# Patient Record
Sex: MALE | Race: WHITE | NOT HISPANIC OR LATINO | Employment: OTHER | URBAN - METROPOLITAN AREA
[De-identification: names, ages, dates, MRNs, and addresses within clinical notes are randomized per-mention and may not be internally consistent; named-entity substitution may affect disease eponyms.]

---

## 2017-01-19 ENCOUNTER — HOSPITAL ENCOUNTER (OUTPATIENT)
Dept: LAB | Facility: HOSPITAL | Age: 77
Discharge: HOME | End: 2017-01-19
Attending: FAMILY MEDICINE | Admitting: FAMILY MEDICINE

## 2017-01-19 LAB
INR PPP: 2.4 (ref 2–3)
PROTHROMBIN TIME: ABNORMAL SEC (ref 19.4–28.5)

## 2017-08-02 ENCOUNTER — HOSPITAL ENCOUNTER (OUTPATIENT)
Dept: CARDIOLOGY | Facility: HOSPITAL | Age: 77
Discharge: HOME | End: 2017-08-02
Attending: INTERNAL MEDICINE | Admitting: INTERNAL MEDICINE

## 2017-08-07 ENCOUNTER — HOSPITAL ENCOUNTER (OUTPATIENT)
Dept: CT IMAGING | Facility: HOSPITAL | Age: 77
Discharge: HOME | End: 2017-08-07
Attending: INTERNAL MEDICINE | Admitting: INTERNAL MEDICINE

## 2017-08-07 LAB — CREAT BLDA-MCNC: 1 MG/DL (ref 0.6–1.3)

## 2017-09-07 ENCOUNTER — OFFICE VISIT (OUTPATIENT)
Dept: CARDIAC SURGERY | Facility: CLINIC | Age: 77
End: 2017-09-07

## 2017-09-07 VITALS
OXYGEN SATURATION: 95 % | DIASTOLIC BLOOD PRESSURE: 77 MMHG | BODY MASS INDEX: 23.68 KG/M2 | SYSTOLIC BLOOD PRESSURE: 125 MMHG | TEMPERATURE: 97.4 F | HEART RATE: 82 BPM | RESPIRATION RATE: 20 BRPM | HEIGHT: 70 IN | WEIGHT: 165.4 LBS

## 2017-09-07 DIAGNOSIS — J44.9 COPD MIXED TYPE (HCC): ICD-10-CM

## 2017-09-07 DIAGNOSIS — I36.1 NON-RHEUMATIC TRICUSPID VALVE INSUFFICIENCY: ICD-10-CM

## 2017-09-07 DIAGNOSIS — I71.40 ABDOMINAL AORTIC ANEURYSM (AAA) WITHOUT RUPTURE (HCC): ICD-10-CM

## 2017-09-07 DIAGNOSIS — I48.0 PAF (PAROXYSMAL ATRIAL FIBRILLATION) (HCC): ICD-10-CM

## 2017-09-07 DIAGNOSIS — R91.1 INCIDENTAL LUNG NODULE, > 3MM AND < 8MM: ICD-10-CM

## 2017-09-07 DIAGNOSIS — I71.21 ASCENDING AORTIC ANEURYSM (HCC): Primary | ICD-10-CM

## 2017-09-07 PROCEDURE — 99204 OFFICE O/P NEW MOD 45 MIN: CPT | Performed by: THORACIC SURGERY (CARDIOTHORACIC VASCULAR SURGERY)

## 2017-09-07 RX ORDER — POTASSIUM CHLORIDE 1500 MG/1
TABLET, EXTENDED RELEASE ORAL
Status: ON HOLD | COMMUNITY
Start: 2017-06-26 | End: 2019-06-14 | Stop reason: SDUPTHER

## 2017-09-07 RX ORDER — BUPROPION HYDROCHLORIDE 150 MG/1
TABLET ORAL
Status: ON HOLD | COMMUNITY
Start: 2017-06-14 | End: 2019-06-14 | Stop reason: SDUPTHER

## 2017-09-07 RX ORDER — ALPRAZOLAM 0.25 MG/1
TABLET ORAL
Status: ON HOLD | COMMUNITY
Start: 2017-06-26 | End: 2019-06-14 | Stop reason: SDUPTHER

## 2017-09-07 RX ORDER — ROPINIROLE 0.5 MG/1
TABLET, FILM COATED ORAL
COMMUNITY
Start: 2017-06-26 | End: 2017-09-07 | Stop reason: DRUGHIGH

## 2017-09-10 PROBLEM — I36.1 NON-RHEUMATIC TRICUSPID VALVE INSUFFICIENCY: Status: ACTIVE | Noted: 2017-09-10

## 2017-09-10 PROBLEM — J44.9 COPD MIXED TYPE (HCC): Status: ACTIVE | Noted: 2017-09-10

## 2017-09-10 PROBLEM — R91.1 INCIDENTAL LUNG NODULE, > 3MM AND < 8MM: Status: ACTIVE | Noted: 2017-09-10

## 2017-09-10 PROBLEM — I71.40 ABDOMINAL AORTIC ANEURYSM (AAA) WITHOUT RUPTURE (HCC): Status: ACTIVE | Noted: 2017-09-10

## 2017-09-10 PROBLEM — I48.0 PAF (PAROXYSMAL ATRIAL FIBRILLATION) (HCC): Status: ACTIVE | Noted: 2017-09-10

## 2017-09-10 PROBLEM — I71.21 ASCENDING AORTIC ANEURYSM (HCC): Status: ACTIVE | Noted: 2017-09-10

## 2017-09-10 NOTE — PROGRESS NOTES
9/7/17    Reason for consultation:   Evaluation of thoracic aortic aneurysm    Chief Complaint   Same    History of Present Illness:       Dear Dr. Frost and Colleagues,  It was nice to see Giles Margot in consultation at your request. He is a 76 y.o. male with a history of COPD, PAF who has been followed for dyspnea and low level CHF. He had a recent chest CT that showed a 4.8 cm (my measurement) ascending aortic aneurysm without dissection or ulceration. Also an old lung defect, possible PE was seen. He denies chest or upper back pain, syncope, TIA, LE edema or orthopnea. An echocardiogram showed an EF of 45 %, dilated RA/LA, mild TR and dilated RV. He had a chest CT in 2013 (3.8 cm), 2015 (4.2 cm) and now 4.7 cm. He has no family history of aneurysms or dissections.    Patient Active Problem List   Diagnosis   • Ascending aortic aneurysm   • Incidental lung nodule, > 3mm and < 8mm   • PAF (paroxysmal atrial fibrillation)   • COPD mixed type   • Abdominal aortic aneurysm (AAA) without rupture   • Non-rheumatic tricuspid valve insufficiency       Past Medical History:   Diagnosis Date   • Aortic aneurysm    • Atrial fibrillation    • CHF (congestive heart failure)    • COPD (chronic obstructive pulmonary disease)    • Orthostatic hypertension        Past Surgical History:   Procedure Laterality Date   • TONSILLECTOMY         Allergies   Allergen Reactions   • Gabapentin          Current Outpatient Prescriptions:   •  albuterol (PROVENTIL) (2.5 MG/3ML) 0.083% nebulizer solution, Take 2.5 mg by nebulization Every 4 (Four) Hours As Needed for Wheezing., Disp: , Rfl:   •  ALPRAZolam (XANAX) 0.25 MG tablet, , Disp: , Rfl:   •  beclomethasone (QVAR) 40 MCG/ACT inhaler, Inhale 2 puffs 2 (Two) Times a Day., Disp: , Rfl:   •  buPROPion XL (WELLBUTRIN XL) 150 MG 24 hr tablet, , Disp: , Rfl:   •  digoxin (LANOXIN) 125 MCG tablet, Take 125 mcg by mouth Daily., Disp: , Rfl:   •  diltiaZEM (CARDIZEM) 30 MG tablet, Take 30 mg  by mouth 4 (Four) Times a Day., Disp: , Rfl:   •  furosemide (LASIX) 40 MG tablet, Take 40 mg by mouth 2 (Two) Times a Day., Disp: , Rfl:   •  ipratropium-albuterol (COMBIVENT RESPIMAT)  MCG/ACT inhaler, Inhale 1 puff 4 (Four) Times a Day As Needed for Wheezing., Disp: , Rfl:   •  KLOR-CON 20 MEQ CR tablet, , Disp: , Rfl:   •  melatonin 1 MG tablet, Take 2 mg by mouth., Disp: , Rfl:   •  metFORMIN (FORTAMET) 500 MG (OSM) 24 hr tablet, Take 500 mg by mouth Daily With Breakfast., Disp: , Rfl:   •  rOPINIRole (REQUIP) 1 MG tablet, Take 1 mg by mouth Every Night. Take 1 hour before bedtime., Disp: , Rfl:   •  warfarin (COUMADIN) 3 MG tablet, Take 3 mg by mouth Daily., Disp: , Rfl:     Social History     Social History   • Marital status:      Spouse name: N/A   • Number of children: N/A   • Years of education: N/A     Occupational History   • Not on file.     Social History Main Topics   • Smoking status: Never Smoker   • Smokeless tobacco: Not on file   • Alcohol use No   • Drug use: No   • Sexual activity: Not on file     Other Topics Concern   • Not on file     Social History Narrative       FMH:  Negative per HPI    Review of Systems   Constitutional: Positive for fatigue.   Respiratory: Positive for shortness of breath. Negative for chest tightness and wheezing.    Cardiovascular: Negative for chest pain and leg swelling.   Genitourinary: Negative for dysuria and flank pain.   Neurological: Positive for syncope. Negative for dizziness.   All other systems reviewed and are negative.       Physical Exam:    Vital Signs:  Weight: 165 lb 6.4 oz (75 kg)   Body mass index is 23.73 kg/(m^2).  Temp: 97.4 °F (36.3 °C)   Heart Rate: 82   BP: 125/77     Physical Exam   Constitutional: He is oriented to person, place, and time. He appears well-developed and well-nourished.   HENT:   Head: Normocephalic and atraumatic.   Nose: Nose normal.   Mouth/Throat: Oropharynx is clear and moist.   Eyes: Conjunctivae are  normal. Pupils are equal, round, and reactive to light.   Neck: Normal range of motion. Neck supple. No JVD present. No thyromegaly present.   Cardiovascular: Normal rate, regular rhythm, normal heart sounds and intact distal pulses.  PMI is not displaced.  Exam reveals no gallop and no friction rub.    No murmur heard.  Pulses:       Radial pulses are 2+ on the right side, and 2+ on the left side.        Dorsalis pedis pulses are 2+ on the left side.        Posterior tibial pulses are 2+ on the right side   Pulmonary/Chest: Effort normal and breath sounds normal. He has no rales.   Abdominal: Soft. Bowel sounds are normal. He exhibits no distension and no mass. There is no tenderness.   Musculoskeletal: Normal range of motion. He exhibits no tenderness or deformity.   Lymphadenopathy:     He has no cervical adenopathy.   Neurological: He is alert and oriented to person, place, and time. He has normal reflexes.   Skin: Skin is warm and dry. No rash noted. No erythema.   Psychiatric: He has a normal mood and affect. His behavior is normal.   No neck or groin adenomegalies    Relevant studies (other than HPI)        Assessment:     Problem List Items Addressed This Visit        Cardiovascular and Mediastinum    Ascending aortic aneurysm - Primary    PAF (paroxysmal atrial fibrillation)    Non-rheumatic tricuspid valve insufficiency       Respiratory    Incidental lung nodule, > 3mm and < 8mm    COPD mixed type       Other    Abdominal aortic aneurysm (AAA) without rupture        Asymptomatic ascending aortic aneurysm, with slow but documented growth.  PAF  CHF class II  Syncope    Recommendation/Plan:     I recommend a chest CT and office visit in 1 year. I advised patient to visit ER if chest or upper back poain develops. He verbalized agreement.      Thank you for allowing me to participate in his care.    Regards,    Julián Mina M.D.

## 2018-09-19 ENCOUNTER — HOSPITAL ENCOUNTER (OUTPATIENT)
Dept: CT IMAGING | Facility: HOSPITAL | Age: 78
Discharge: HOME | End: 2018-09-19
Attending: THORACIC SURGERY (CARDIOTHORACIC VASCULAR SURGERY) | Admitting: THORACIC SURGERY (CARDIOTHORACIC VASCULAR SURGERY)

## 2018-10-11 ENCOUNTER — OFFICE VISIT (OUTPATIENT)
Dept: CARDIAC SURGERY | Facility: CLINIC | Age: 78
End: 2018-10-11

## 2018-10-11 VITALS
OXYGEN SATURATION: 93 % | HEIGHT: 70 IN | SYSTOLIC BLOOD PRESSURE: 118 MMHG | DIASTOLIC BLOOD PRESSURE: 74 MMHG | HEART RATE: 104 BPM | TEMPERATURE: 97.7 F | WEIGHT: 165 LBS | BODY MASS INDEX: 23.62 KG/M2

## 2018-10-11 DIAGNOSIS — I71.21 ASCENDING AORTIC ANEURYSM (HCC): Primary | ICD-10-CM

## 2018-10-11 DIAGNOSIS — I36.1 NON-RHEUMATIC TRICUSPID VALVE INSUFFICIENCY: ICD-10-CM

## 2018-10-11 DIAGNOSIS — I48.0 PAF (PAROXYSMAL ATRIAL FIBRILLATION) (HCC): ICD-10-CM

## 2018-10-11 DIAGNOSIS — R91.1 INCIDENTAL LUNG NODULE, > 3MM AND < 8MM: ICD-10-CM

## 2018-10-11 DIAGNOSIS — J44.9 COPD MIXED TYPE (HCC): ICD-10-CM

## 2018-10-11 PROCEDURE — 99213 OFFICE O/P EST LOW 20 MIN: CPT | Performed by: THORACIC SURGERY (CARDIOTHORACIC VASCULAR SURGERY)

## 2018-10-11 NOTE — PROGRESS NOTES
10/11/2018      Chief Complaint:     Follow up evaluation of thoracic aortic aneurysm      History of Present Illness:       Dear Dr. Lewis and Colleagues,  It was nice to see Giles Frost in follow up evaluation for his thoracic aortic dilatation. He is a 77 y.o. male with a history of atrial fibrillation, COPD, alcohol use, and small lung nodules who I saw him last year for enlarging proximal thoracic aorta.  The ascending aorta at that time was 4.8 cm on my measurements . He denies any new symptoms in the interval. His repeat chest CT showed a 4.8 cm ascending aortic aneurysm without dissection or ulceration.  His lung nodules are unchanged.  He has no family history of aneurysms or dissections.  His atrial fibrillation and anticoagulation are well controlled.  He is here for follow-up.    Patient Active Problem List   Diagnosis   • Ascending aortic aneurysm (CMS/HCC)   • Incidental lung nodule, > 3mm and < 8mm   • PAF (paroxysmal atrial fibrillation) (CMS/HCC)   • COPD mixed type (CMS/HCC)   • Abdominal aortic aneurysm (AAA) without rupture (CMS/HCC)   • Non-rheumatic tricuspid valve insufficiency       Past Medical History:   Diagnosis Date   • AAA (abdominal aortic aneurysm) (CMS/HCC)    • Alcohol abuse     Hx   • Alcoholic cirrhosis (CMS/HCC)    • Anxiety     Welbutrin   • Aortic aneurysm (CMS/HCC) 09/19/2018    Ascending Measuring 4.7CM Noted on CT Chest   • Atrial fibrillation (CMS/HCC)    • Cardiomegaly 09/19/2018    CT Chest   • Centrilobular emphysema (CMS/HCC) 09/19/2018    On CT Chest   • CHF (congestive heart failure) (CMS/HCC)    • Cholecystolithiasis 09/19/2018    On CT Chest   • Chronic liver disease    • COPD (chronic obstructive pulmonary disease) (CMS/HCC)    • Descending thoracic aortic aneurysm (CMS/HCC) 09/19/2018    On Chest CT-4.2CM Near Diaphragmatic Hiatus and 4.8CM at Main Pulmonary Artery   • History of echocardiogram 12/20/16 & 8/2/17-[facility]   • History of EKG 12/2016    Atrial  Fib   • Hx of chest x-ray 12/18/2016    1 V   • Hx of chest x-ray 08/2011   • Hx of CT scan of chest 09/19/18-[facility]    Enlarged Heart Size; Coronary Artery and Aortic Atherosclerotic Calcifications; Ascending Aorta Measuring 4.7CM;    • Long term current use of anticoagulant    • Orthostatic hypertension    • Paraseptal emphysema (CMS/HCC) 09/19/2018    On CT Chest   • Pleural nodule 09/19/2018    On CT Chest-6MM   • Pneumonia     Hx   • Pulmonary nodules 09/19/2018    R on CT Chest   • Respiratory failure (CMS/HCC) Hypoxic       Past Surgical History:   Procedure Laterality Date   • BRONCHOSCOPY  1/12/17- [facility] Moreno   • COLONOSCOPY  08/23/2011    w/EGD-Dr. Tse   • COLONOSCOPY  10/2006    w/EGD-Diony Moy MD   • TONSILLECTOMY         Allergies   Allergen Reactions   • Gabapentin          Current Outpatient Prescriptions:   •  albuterol (PROVENTIL) (2.5 MG/3ML) 0.083% nebulizer solution, Take 2.5 mg by nebulization Every 4 (Four) Hours As Needed for Wheezing., Disp: , Rfl:   •  ALPRAZolam (XANAX) 0.25 MG tablet, , Disp: , Rfl:   •  beclomethasone (QVAR) 40 MCG/ACT inhaler, Inhale 2 puffs 2 (Two) Times a Day., Disp: , Rfl:   •  digoxin (LANOXIN) 125 MCG tablet, Take 125 mcg by mouth Daily., Disp: , Rfl:   •  diltiaZEM (CARDIZEM) 30 MG tablet, Take 30 mg by mouth 4 (Four) Times a Day., Disp: , Rfl:   •  furosemide (LASIX) 40 MG tablet, Take 40 mg by mouth 2 (Two) Times a Day., Disp: , Rfl:   •  ipratropium-albuterol (COMBIVENT RESPIMAT)  MCG/ACT inhaler, Inhale 1 puff 4 (Four) Times a Day As Needed for Wheezing., Disp: , Rfl:   •  KLOR-CON 20 MEQ CR tablet, , Disp: , Rfl:   •  melatonin 1 MG tablet, Take 2 mg by mouth., Disp: , Rfl:   •  metFORMIN (FORTAMET) 500 MG (OSM) 24 hr tablet, Take 500 mg by mouth Daily With Breakfast., Disp: , Rfl:   •  rOPINIRole (REQUIP) 1 MG tablet, Take 1 mg by mouth Every Night. Take 1 hour before bedtime., Disp: , Rfl:   •  warfarin (COUMADIN) 3 MG tablet, Take 3 mg  by mouth Daily., Disp: , Rfl:   •  buPROPion XL (WELLBUTRIN XL) 150 MG 24 hr tablet, , Disp: , Rfl:     Social History     Social History   • Marital status:      Spouse name: Monalisa   • Number of children: N/A   • Years of education: N/A     Occupational History   • RETIRED      Social History Main Topics   • Smoking status: Light Tobacco Smoker     Types: Cigarettes   • Smokeless tobacco: Never Used      Comment: 5 Cigs/Day x 58+ Yrs   • Alcohol use No      Comment: Hx Alcohol Abuse   • Drug use: No   • Sexual activity: Defer     Other Topics Concern   • Not on file     Social History Narrative   • No narrative on file       FMH:  As history of present illness    Review of Systems   Constitutional: Positive for fatigue.   Respiratory: Positive for shortness of breath and wheezing.    Cardiovascular: Positive for palpitations. Negative for chest pain and leg swelling.   Neurological: Positive for weakness.   All other systems reviewed and are negative.      Physical Exam:    Vital Signs:  Weight: 74.8 kg (165 lb)   Body mass index is 23.68 kg/m².  Temp: 97.7 °F (36.5 °C)   Heart Rate: 104   BP: 118/74     Physical Exam   Constitutional: He is oriented to person, place, and time. He appears well-developed and well-nourished.   HENT:   Head: Normocephalic and atraumatic.   Nose: Nose normal.   Mouth/Throat: Oropharynx is clear and moist.   Eyes: Pupils are equal, round, and reactive to light. Conjunctivae are normal.   Neck: Normal range of motion. Neck supple. No JVD present. No thyromegaly present.   Cardiovascular: Normal rate, normal heart sounds and intact distal pulses.  An irregular rhythm present. Exam reveals no gallop and no friction rub.    No murmur heard.  Pulses:       Radial pulses are 2+ on the right side, and 2+ on the left side.        Dorsalis pedis pulses are 2+ on the right side, and 2+ on the left side.   Pulmonary/Chest: Effort normal and breath sounds normal. He has no rales.    Abdominal: Soft. Bowel sounds are normal. He exhibits no distension and no mass. There is no tenderness.   Musculoskeletal: Normal range of motion. He exhibits no tenderness or deformity.   Lymphadenopathy:     He has no cervical adenopathy.   Neurological: He is alert and oriented to person, place, and time. He has normal reflexes.   Skin: Skin is warm and dry. No rash noted. No erythema.   Psychiatric: He has a normal mood and affect. His behavior is normal.        Assessment:     Problem List Items Addressed This Visit        Cardiovascular and Mediastinum    Ascending aortic aneurysm (CMS/HCC) - Primary    PAF (paroxysmal atrial fibrillation) (CMS/HCC)    Non-rheumatic tricuspid valve insufficiency       Respiratory    Incidental lung nodule, > 3mm and < 8mm    COPD mixed type (CMS/HCC)          1. Ascending aortic aneurysm, 4.8 cm , stable  2. Lung nodules, stable  3. Atrial fibrillation, paroxysmal , on warfarin    Recommendation/Plan:     I recommend a chest CT and an office visit in one year.  I discussed with him his strict blood pressure control to prevent complications.  He was instructed to come to the emergency room if chest pain develops      Thank you for allowing me to participate in his care.    Regards,    Julián Mina M.D.

## 2018-11-13 ENCOUNTER — HOSPITAL ENCOUNTER (OUTPATIENT)
Dept: LAB | Facility: HOSPITAL | Age: 78
Discharge: HOME | End: 2018-11-13
Attending: INTERNAL MEDICINE | Admitting: INTERNAL MEDICINE

## 2018-11-13 LAB — DIGOXIN SERPL-MCNC: 1.1 NG/ML (ref 0.8–2)

## 2019-05-14 ENCOUNTER — HOSPITAL ENCOUNTER (OUTPATIENT)
Dept: LAB | Facility: HOSPITAL | Age: 79
Discharge: HOME | End: 2019-05-14
Attending: INTERNAL MEDICINE | Admitting: INTERNAL MEDICINE

## 2019-05-14 LAB — DIGOXIN SERPL-MCNC: 1 NG/ML (ref 0.8–2)

## 2019-05-30 ENCOUNTER — HOSPITAL ENCOUNTER (OUTPATIENT)
Dept: LAB | Facility: HOSPITAL | Age: 79
Discharge: HOME | End: 2019-05-30
Attending: FAMILY MEDICINE | Admitting: FAMILY MEDICINE

## 2019-05-30 LAB
INR PPP: 1.9 (ref 2–3)
PROTHROMBIN TIME: 18.8 SEC (ref 19.4–28.5)

## 2019-06-01 ENCOUNTER — TRANSCRIBE ORDERS (OUTPATIENT)
Dept: CT IMAGING | Facility: HOSPITAL | Age: 79
End: 2019-06-01

## 2019-06-01 ENCOUNTER — TRANSCRIBE ORDERS (OUTPATIENT)
Dept: BARIATRICS/WEIGHT MGMT | Facility: HOSPITAL | Age: 79
End: 2019-06-01

## 2019-06-01 DIAGNOSIS — R91.1 LUNG NODULE: Primary | ICD-10-CM

## 2019-06-12 LAB
ALBUMIN SERPL-MCNC: 4.1 G/DL (ref 3.5–4.8)
ALBUMIN/GLOB SERPL: 1 {RATIO} (ref 1–1.7)
ALP SERPL-CCNC: 80 IU/L (ref 32–91)
ALT SERPL-CCNC: 80 IU/L (ref 17–63)
ANION GAP SERPL CALC-SCNC: 21.9 MMOL/L (ref 10–20)
AST SERPL-CCNC: 69 IU/L (ref 15–41)
BILIRUB SERPL-MCNC: 1.9 MG/DL (ref 0.3–1.2)
BUN SERPL-MCNC: 37 MG/DL (ref 8–20)
BUN/CREAT SERPL: 30.8 (ref 6.2–20.3)
CALCIUM SERPL-MCNC: 9.8 MG/DL (ref 8.9–10.3)
CHLORIDE SERPL-SCNC: 87 MMOL/L (ref 101–111)
CONV ABS BANDS: 1.3 10*3/UL
CONV ANISOCYTES: SLIGHT
CONV CO2: 29 MMOL/L (ref 22–32)
CONV TOTAL PROTEIN: 8.1 G/DL (ref 6.1–7.9)
CREAT UR-MCNC: 1.2 MG/DL (ref 0.7–1.2)
DIFFERENTIAL METHOD BLD: (no result)
DIGOXIN SERPL-MCNC: 1 NG/ML (ref 0.8–2)
ERYTHROCYTE [DISTWIDTH] IN BLOOD BY AUTOMATED COUNT: 15 % (ref 11.5–14.5)
GLOBULIN UR ELPH-MCNC: 4 G/DL (ref 2.5–3.8)
GLUCOSE SERPL-MCNC: 316 MG/DL (ref 65–99)
HCT VFR BLD AUTO: 48.3 % (ref 40–54)
HGB BLD-MCNC: 15.4 G/DL (ref 14–18)
INR PPP: 3.3 (ref 2–3)
LIPASE SERPL-CCNC: 22 U/L (ref 22–51)
LYMPHOCYTES # BLD AUTO: 1.4 10*3/UL (ref 0.8–4.8)
LYMPHOCYTES NFR BLD AUTO: 10 % (ref 18–42)
MCH RBC QN AUTO: 31.1 PG (ref 26–32)
MCHC RBC AUTO-ENTMCNC: 32 G/DL (ref 32–36)
MCV RBC AUTO: 97.1 FL (ref 80–94)
MONOCYTES # BLD AUTO: 1.2 10*3/UL (ref 0.1–1.3)
MONOCYTES NFR BLD AUTO: 8 % (ref 2–11)
NEUTROPHILS # BLD AUTO: 10.5 10*3/UL (ref 2.3–8.6)
NEUTROPHILS NFR BLD AUTO: 73 % (ref 50–75)
NEUTS BAND NFR BLD MANUAL: 9 % (ref 0–5)
PLATELET # BLD AUTO: 342 10*3/UL (ref 150–450)
PMV BLD AUTO: 7.8 FL (ref 7.4–10.4)
POTASSIUM SERPL-SCNC: 3.9 MMOL/L (ref 3.6–5.1)
PROTHROMBIN TIME: 32.3 SEC (ref 19.4–28.5)
RBC # BLD AUTO: 4.97 10*6/UL (ref 4.6–6)
SODIUM SERPL-SCNC: 134 MMOL/L (ref 136–144)
WBC # BLD AUTO: 14.4 10*3/UL (ref 4.5–11.5)

## 2019-06-13 ENCOUNTER — HOSPITAL ENCOUNTER (INPATIENT)
Dept: OTHER | Facility: HOSPITAL | Age: 79
LOS: 1 days | Discharge: HOME | End: 2019-06-14
Attending: EMERGENCY MEDICINE | Admitting: INTERNAL MEDICINE

## 2019-06-13 LAB
ALBUMIN SERPL-MCNC: 3.8 G/DL (ref 3.5–4.8)
ALP SERPL-CCNC: 76 IU/L (ref 32–91)
ALT SERPL-CCNC: 71 IU/L (ref 17–63)
ANION GAP SERPL CALC-SCNC: 18.2 MMOL/L (ref 10–20)
AST SERPL-CCNC: 59 IU/L (ref 15–41)
BASE EXCESS BLDA CALC-SCNC: 6.7 MMOL/L (ref 0–3)
BILIRUB DIRECT SERPL-MCNC: 0.5 MG/DL (ref 0.1–0.5)
BILIRUB SERPL-MCNC: 1.8 MG/DL (ref 0.3–1.2)
BNP SERPL-MCNC: 881 PG/ML
BUN SERPL-MCNC: 32 MG/DL (ref 8–20)
BUN/CREAT SERPL: 32 (ref 6.2–20.3)
CALCIUM SERPL-MCNC: 9.7 MG/DL (ref 8.9–10.3)
CHLORIDE SERPL-SCNC: 94 MMOL/L (ref 101–111)
CO2 BLDA-SCNC: 35.1 MMOL/L (ref 22–29)
CONV ABS BANDS: 1.2 10*3/UL
CONV ALLEN'S TEST: POSITIVE
CONV CO2: 28 MMOL/L (ref 22–32)
CONV DEVICE: ABNORMAL
CONV FIO2: 4
CONV HEMODILUTION: NO
CONV SITE: ABNORMAL
CONV TOTAL PROTEIN: 7.6 G/DL (ref 6.1–7.9)
CREAT UR-MCNC: 1 MG/DL (ref 0.7–1.2)
D-LACTATE SERPL-SCNC: 2.1 MMOL/L (ref 0.5–2.2)
D-LACTATE SERPL-SCNC: 2.1 MMOL/L (ref 0.5–2.2)
DIFFERENTIAL METHOD BLD: (no result)
ERYTHROCYTE [DISTWIDTH] IN BLOOD BY AUTOMATED COUNT: 14.8 % (ref 11.5–14.5)
GLUCOSE BLD-MCNC: 159 MG/DL (ref 70–105)
GLUCOSE BLD-MCNC: 164 MG/DL (ref 70–105)
GLUCOSE BLD-MCNC: 204 MG/DL (ref 70–105)
GLUCOSE BLD-MCNC: 207 MG/DL (ref 70–105)
GLUCOSE BLD-MCNC: 235 MG/DL (ref 70–105)
GLUCOSE SERPL-MCNC: 193 MG/DL (ref 65–99)
HBA1C MFR BLD: 6.6 % (ref 0–5.6)
HCO3 BLDA-SCNC: 33.5 MMOL/L (ref 21–28)
HCT VFR BLD AUTO: 49 % (ref 40–54)
HGB BLD-MCNC: 15.6 G/DL (ref 14–18)
INR PPP: 3.5 (ref 2–3)
LIPASE SERPL-CCNC: 77 U/L (ref 22–51)
LYMPHOCYTES # BLD AUTO: 1.9 10*3/UL (ref 0.8–4.8)
LYMPHOCYTES NFR BLD AUTO: 11 % (ref 18–42)
MCH RBC QN AUTO: 31.3 PG (ref 26–32)
MCHC RBC AUTO-ENTMCNC: 31.8 G/DL (ref 32–36)
MCV RBC AUTO: 98.5 FL (ref 80–94)
MONOCYTES # BLD AUTO: 0.4 10*3/UL (ref 0.1–1.3)
MONOCYTES NFR BLD AUTO: 2 % (ref 2–11)
NEUTROPHILS # BLD AUTO: 14.1 10*3/UL (ref 2.3–8.6)
NEUTROPHILS NFR BLD AUTO: 80 % (ref 50–75)
NEUTS BAND NFR BLD MANUAL: 7 % (ref 0–5)
PCO2 BLDA: 53.4 MM[HG] (ref 35–48)
PH BLDA: 7.41 [PH] (ref 7.35–7.45)
PLATELET # BLD AUTO: 260 10*3/UL (ref 150–450)
PMV BLD AUTO: 7.6 FL (ref 7.4–10.4)
PO2 BLD: 59 MM[HG] (ref 83–108)
POTASSIUM SERPL-SCNC: 4.2 MMOL/L (ref 3.6–5.1)
PROTHROMBIN TIME: 33.6 SEC (ref 19.4–28.5)
RBC # BLD AUTO: 4.97 10*6/UL (ref 4.6–6)
SAO2 % BLDCOA: 90 % (ref 94–98)
SODIUM SERPL-SCNC: 136 MMOL/L (ref 136–144)
SPECIMEN TYPE: ABNORMAL
WBC # BLD AUTO: 17.6 10*3/UL (ref 4.5–11.5)

## 2019-06-14 VITALS — HEIGHT: 70 IN | BODY MASS INDEX: 22.79 KG/M2 | WEIGHT: 159.17 LBS

## 2019-06-14 PROBLEM — R10.9 ACUTE ABDOMINAL PAIN: Status: ACTIVE | Noted: 2019-06-14

## 2019-06-14 LAB
ALBUMIN SERPL-MCNC: 3.5 G/DL (ref 3.5–4.8)
ALBUMIN/GLOB SERPL: 1 {RATIO} (ref 1–1.7)
ALP SERPL-CCNC: 68 IU/L (ref 32–91)
ALT SERPL-CCNC: 63 IU/L (ref 17–63)
ANION GAP SERPL CALC-SCNC: 18.7 MMOL/L (ref 10–20)
AST SERPL-CCNC: 41 IU/L (ref 15–41)
BILIRUB DIRECT SERPL-MCNC: 0.4 MG/DL (ref 0.1–0.5)
BILIRUB INDIRECT SERPL-MCNC: 1.3 MG/DL (ref 0–1.1)
BILIRUB SERPL-MCNC: 1.7 MG/DL (ref 0.3–1.2)
BUN SERPL-MCNC: 32 MG/DL (ref 8–20)
BUN/CREAT SERPL: 29.1 (ref 6.2–20.3)
CALCIUM SERPL-MCNC: 9 MG/DL (ref 8.9–10.3)
CHLORIDE SERPL-SCNC: 96 MMOL/L (ref 101–111)
CONV ABS BANDS: 1.6 10*3/UL
CONV ANISOCYTES: SLIGHT
CONV CO2: 29 MMOL/L (ref 22–32)
CONV TOTAL PROTEIN: 7.1 G/DL (ref 6.1–7.9)
CREAT UR-MCNC: 1.1 MG/DL (ref 0.7–1.2)
DIFFERENTIAL METHOD BLD: (no result)
ERYTHROCYTE [DISTWIDTH] IN BLOOD BY AUTOMATED COUNT: 15 % (ref 11.5–14.5)
GLOBULIN UR ELPH-MCNC: 3.6 G/DL (ref 2.5–3.8)
GLUCOSE BLD-MCNC: 244 MG/DL (ref 70–105)
GLUCOSE BLD-MCNC: 248 MG/DL (ref 70–105)
GLUCOSE BLD-MCNC: 256 MG/DL (ref 70–105)
GLUCOSE SERPL-MCNC: 231 MG/DL (ref 65–99)
HCT VFR BLD AUTO: 47.4 % (ref 40–54)
HGB BLD-MCNC: 15.4 G/DL (ref 14–18)
INR PPP: 3.6 (ref 2–3)
LYMPHOCYTES # BLD AUTO: 0.3 10*3/UL (ref 0.8–4.8)
LYMPHOCYTES NFR BLD AUTO: 2 % (ref 18–42)
MCH RBC QN AUTO: 31.5 PG (ref 26–32)
MCHC RBC AUTO-ENTMCNC: 32.5 G/DL (ref 32–36)
MCV RBC AUTO: 96.7 FL (ref 80–94)
MONOCYTES # BLD AUTO: 0.5 10*3/UL (ref 0.1–1.3)
MONOCYTES NFR BLD AUTO: 3 % (ref 2–11)
NEUTROPHILS # BLD AUTO: 14 10*3/UL (ref 2.3–8.6)
NEUTROPHILS NFR BLD AUTO: 85 % (ref 50–75)
NEUTS BAND NFR BLD MANUAL: 10 % (ref 0–5)
NRBC BLD AUTO-RTO: 2 /100{WBCS}
PLATELET # BLD AUTO: 253 10*3/UL (ref 150–450)
PMV BLD AUTO: 7.7 FL (ref 7.4–10.4)
POTASSIUM SERPL-SCNC: 3.7 MMOL/L (ref 3.6–5.1)
PROTHROMBIN TIME: 34.8 SEC (ref 19.4–28.5)
RBC # BLD AUTO: 4.9 10*6/UL (ref 4.6–6)
SODIUM SERPL-SCNC: 140 MMOL/L (ref 136–144)
WBC # BLD AUTO: 16.4 10*3/UL (ref 4.5–11.5)

## 2019-06-14 RX ORDER — METFORMIN HYDROCHLORIDE 500 MG/1
500 TABLET, EXTENDED RELEASE ORAL EVERY MORNING
Refills: 5 | COMMUNITY
Start: 2019-05-06

## 2019-06-14 RX ORDER — ROPINIROLE 0.5 MG/1
0.5 TABLET, FILM COATED ORAL 2 TIMES DAILY
Refills: 4 | COMMUNITY
Start: 2019-05-10

## 2019-06-14 RX ORDER — IPRATROPIUM BROMIDE AND ALBUTEROL SULFATE 2.5; .5 MG/3ML; MG/3ML
3 SOLUTION RESPIRATORY (INHALATION)
COMMUNITY

## 2019-06-14 RX ORDER — METFORMIN HYDROCHLORIDE EXTENDED-RELEASE TABLETS 500 MG/1
1000 TABLET, FILM COATED, EXTENDED RELEASE ORAL
COMMUNITY
Start: 2017-07-28

## 2019-06-14 RX ORDER — WARFARIN SODIUM 3 MG/1
3 TABLET ORAL DAILY
COMMUNITY
Start: 2013-05-27 | End: 2019-09-10

## 2019-06-14 RX ORDER — ALPRAZOLAM 0.5 MG/1
0.5 TABLET ORAL NIGHTLY PRN
COMMUNITY
Start: 2017-01-27 | End: 2019-08-30

## 2019-06-14 RX ORDER — POTASSIUM CHLORIDE 1.5 G/1.77G
20 POWDER, FOR SOLUTION ORAL DAILY
COMMUNITY
Start: 2018-05-18

## 2019-06-14 RX ORDER — PREDNISONE 20 MG/1
40 TABLET ORAL DAILY
COMMUNITY
End: 2019-07-03

## 2019-06-14 RX ORDER — FUROSEMIDE 40 MG/1
80 TABLET ORAL
COMMUNITY
Start: 2013-05-27

## 2019-06-14 RX ORDER — DIGOXIN 125 MCG
125 TABLET ORAL EVERY MORNING
COMMUNITY
Start: 2017-01-27

## 2019-06-14 RX ORDER — DOXYCYCLINE HYCLATE 100 MG/1
100 CAPSULE ORAL
COMMUNITY
End: 2019-09-10

## 2019-06-17 ENCOUNTER — TELEPHONE (OUTPATIENT)
Dept: CARDIAC SURGERY | Facility: CLINIC | Age: 79
End: 2019-06-17

## 2019-06-17 NOTE — TELEPHONE ENCOUNTER
Patients wife call to ask to move her husbands appointment up. He has had recent CT testing that indicates his aneurysm has grown and patients PCP suggest seeing Dr Mina sooner than the Sept appointment. I rescheduled patient to see Dr Mina 6/27/19 at 11am which was agreeable to him.

## 2019-06-19 ENCOUNTER — HOSPITAL ENCOUNTER (EMERGENCY)
Facility: HOSPITAL | Age: 79
Discharge: HOME | End: 2019-06-19
Attending: EMERGENCY MEDICINE | Admitting: EMERGENCY MEDICINE

## 2019-06-19 VITALS
SYSTOLIC BLOOD PRESSURE: 120 MMHG | HEART RATE: 85 BPM | HEIGHT: 70 IN | RESPIRATION RATE: 20 BRPM | TEMPERATURE: 98.1 F | OXYGEN SATURATION: 96 % | DIASTOLIC BLOOD PRESSURE: 69 MMHG | BODY MASS INDEX: 21.47 KG/M2 | WEIGHT: 150 LBS

## 2019-06-19 DIAGNOSIS — S01.311A LACERATION OF ANTIHELIX OF RIGHT EAR, INITIAL ENCOUNTER: Primary | ICD-10-CM

## 2019-06-19 PROCEDURE — 99284 EMERGENCY DEPT VISIT MOD MDM: CPT

## 2019-06-19 RX ORDER — CEPHALEXIN 500 MG/1
500 CAPSULE ORAL 2 TIMES DAILY
Qty: 10 CAPSULE | Refills: 0 | Status: SHIPPED | OUTPATIENT
Start: 2019-06-19 | End: 2019-08-30

## 2019-06-21 ENCOUNTER — TRANSCRIBE ORDERS (OUTPATIENT)
Dept: ADMINISTRATIVE | Facility: HOSPITAL | Age: 79
End: 2019-06-21

## 2019-06-21 ENCOUNTER — LAB (OUTPATIENT)
Dept: LAB | Facility: HOSPITAL | Age: 79
End: 2019-06-21

## 2019-06-21 DIAGNOSIS — Z79.01 LONG TERM (CURRENT) USE OF ANTICOAGULANTS: Primary | ICD-10-CM

## 2019-06-21 DIAGNOSIS — Z79.01 LONG TERM (CURRENT) USE OF ANTICOAGULANTS: ICD-10-CM

## 2019-06-21 LAB
INR PPP: 2.24 (ref 2–3)
PROTHROMBIN TIME: 21.6 SECONDS (ref 19.4–28.5)

## 2019-06-21 PROCEDURE — 36415 COLL VENOUS BLD VENIPUNCTURE: CPT

## 2019-06-21 PROCEDURE — 85610 PROTHROMBIN TIME: CPT

## 2019-06-27 ENCOUNTER — OFFICE VISIT (OUTPATIENT)
Dept: CARDIAC SURGERY | Facility: CLINIC | Age: 79
End: 2019-06-27

## 2019-06-27 VITALS
BODY MASS INDEX: 22.62 KG/M2 | HEIGHT: 70 IN | TEMPERATURE: 97.6 F | WEIGHT: 158 LBS | SYSTOLIC BLOOD PRESSURE: 113 MMHG | HEART RATE: 85 BPM | OXYGEN SATURATION: 97 % | DIASTOLIC BLOOD PRESSURE: 78 MMHG | RESPIRATION RATE: 20 BRPM

## 2019-06-27 DIAGNOSIS — R91.1 INCIDENTAL LUNG NODULE, > 3MM AND < 8MM: ICD-10-CM

## 2019-06-27 DIAGNOSIS — K70.30 ALCOHOLIC CIRRHOSIS OF LIVER WITHOUT ASCITES (HCC): ICD-10-CM

## 2019-06-27 DIAGNOSIS — I36.1 NON-RHEUMATIC TRICUSPID VALVE INSUFFICIENCY: ICD-10-CM

## 2019-06-27 DIAGNOSIS — I71.21 ASCENDING AORTIC ANEURYSM (HCC): ICD-10-CM

## 2019-06-27 DIAGNOSIS — I71.40 ABDOMINAL AORTIC ANEURYSM (AAA) WITHOUT RUPTURE (HCC): Primary | ICD-10-CM

## 2019-06-27 DIAGNOSIS — I48.0 PAF (PAROXYSMAL ATRIAL FIBRILLATION) (HCC): ICD-10-CM

## 2019-06-27 PROCEDURE — 99214 OFFICE O/P EST MOD 30 MIN: CPT | Performed by: THORACIC SURGERY (CARDIOTHORACIC VASCULAR SURGERY)

## 2019-06-28 ENCOUNTER — OFFICE VISIT (OUTPATIENT)
Dept: BARIATRICS/WEIGHT MGMT | Facility: CLINIC | Age: 79
End: 2019-06-28

## 2019-06-28 VITALS
WEIGHT: 152 LBS | SYSTOLIC BLOOD PRESSURE: 117 MMHG | OXYGEN SATURATION: 97 % | BODY MASS INDEX: 21.76 KG/M2 | HEIGHT: 70 IN | HEART RATE: 97 BPM | DIASTOLIC BLOOD PRESSURE: 62 MMHG

## 2019-06-28 DIAGNOSIS — K81.9 CHOLECYSTITIS: Primary | ICD-10-CM

## 2019-06-28 PROCEDURE — 99213 OFFICE O/P EST LOW 20 MIN: CPT | Performed by: SURGERY

## 2019-06-28 NOTE — PROGRESS NOTES
"Subjective:       Giles Frost is a 78-year gentleman with recent hospitalized for abdominal pain.  He was found to have cholelithiasis and mild color wall thickening.  Is also found to have an abdominal aortic aneurysm about 4.8 cm in size.  He has a history of CHF and COPD and felt he was too risky of surgical candidate.  His symptoms improved he was discharged home.  He has recently seen Dr. Mina who is referred to vascular surgery for the abdominal aneurysm and he may need a stent.  He is able to tolerate food and has no significant right upper quadrant pain no nausea no vomiting.  He been falling a lot recently recently cut his ear.  Objective:      /62 (BP Location: Left arm, Patient Position: Sitting, Cuff Size: Adult)   Pulse 97   Ht 177.8 cm (70\")   Wt 68.9 kg (152 lb)   SpO2 97%   BMI 21.81 kg/m²     General:  alert, appears stated age and cooperative   Abdomen: soft, bowel sounds active, appropriate tenderness       Heart: Regular rate   Lungs: Clear to auscultation bilaterally     I reviewed the patient's new clinical results.        Assessment:      No evidence of acute cholecystitis       Plan:   he will see vascular surgery for his abdominal aneurysm in the near future.  He will not undergo a laparoscopic cholecystectomy at this time because he is not having any symptoms or signs of acute cholecystitis.  "

## 2019-06-29 PROBLEM — K70.30 ALCOHOLIC CIRRHOSIS OF LIVER WITHOUT ASCITES (HCC): Status: ACTIVE | Noted: 2019-06-29

## 2019-06-29 NOTE — PROGRESS NOTES
6/29/2019    Seen on 6/27/19    Chief Complaint:     Follow-up evaluation of thoracic aortic aneurysm    History of Present Illness:       Dear Candelario and Colleagues,  It was nice to see Giles Frost in follow up evaluation for his thoracic aorta. He is a 78 y.o. male with a history of atrial fibrillation, COPD, alcohol use, liver cirrhosis, and small lung nodules who I saw first in 2017 for an enlarging proximal thoracic aorta.  At that time his aorta was 4.8 cm and continue to be the same in 2018.  He was supposed to come in November however some enlargement was noticed and the patient wanted to be seen earlier.  He has recently in the hospital because of his COPD exacerbation as well as pneumonia.  He had a coronary artery disease and also received a PCI.  It is unknown the severity of his cirrhosis to me.  He is extremely anxious and panicky.  He is very weak and debilitated from his recent admission he is still recovering. He denies chest pain or abdominal pain, hematuria, dyspnea orthopnea or PND. His last chest CT (6/19) showed descending aorta at 4.9/5 cm without dissection or ulceration, that this may be at bit and large.  The abdominal component show a 4.8 cm intra-abdominal abdominal aortic aneurysm with a with a saccular shape on the left.  There is no evidence of rupture or stranding.  The gallbladder shows gallstones and suggestion of possible distention.  He is here for early follow-up    Patient Active Problem List   Diagnosis   • Ascending aortic aneurysm (CMS/HCC)   • Incidental lung nodule, > 3mm and < 8mm   • PAF (paroxysmal atrial fibrillation) (CMS/HCC)   • COPD mixed type (CMS/HCC)   • Abdominal aortic aneurysm (AAA) without rupture (CMS/HCC)   • Non-rheumatic tricuspid valve insufficiency   • Acute abdominal pain   • Alcoholic cirrhosis of liver without ascites (CMS/HCC)       Past Medical History:   Diagnosis Date   • AAA (abdominal aortic aneurysm) (CMS/HCC)    • Alcohol abuse     Hx    • Alcoholic cirrhosis (CMS/HCC)    • Anxiety     Welbutrin   • Aortic aneurysm (CMS/HCC) 09/19/2018    Ascending Measuring 4.7CM Noted on CT Chest   • Atrial fibrillation (CMS/HCC)    • Cardiomegaly 09/19/2018    CT Chest   • Centrilobular emphysema (CMS/HCC) 09/19/2018    On CT Chest   • CHF (congestive heart failure) (CMS/HCC)    • Cholecystolithiasis 09/19/2018    On CT Chest   • Chronic liver disease    • COPD (chronic obstructive pulmonary disease) (CMS/HCC)    • Descending thoracic aortic aneurysm (CMS/HCC) 09/19/2018    On Chest CT-4.2CM Near Diaphragmatic Hiatus and 4.8CM at Main Pulmonary Artery   • History of echocardiogram 12/20/16 & 8/2/17- Moo   • History of EKG 12/2016    Atrial Fib   • Hx of chest x-ray 12/18/2016    1 V   • Hx of chest x-ray 08/2011   • Hx of CT scan of chest 09/19/18-[facility]    Enlarged Heart Size; Coronary Artery and Aortic Atherosclerotic Calcifications; Ascending Aorta Measuring 4.7CM;    • Long term current use of anticoagulant    • Orthostatic hypertension    • Paraseptal emphysema (CMS/HCC) 09/19/2018    On CT Chest   • Pleural nodule 09/19/2018    On CT Chest-6MM   • Pneumonia     Hx   • Pulmonary nodules 09/19/2018    R on CT Chest   • Respiratory failure (CMS/HCC) Hypoxic       Past Surgical History:   Procedure Laterality Date   • BRONCHOSCOPY  1/12/17- Moo Moerno   • COLONOSCOPY  08/23/2011    w/EGD-Dr. Tse   • COLONOSCOPY  10/2006    w/EGD-Diony Moy MD   • TONSILLECTOMY         Allergies   Allergen Reactions   • Gabapentin Other (See Comments) and Confusion     Vision loss           Current Outpatient Medications:   •  ALPRAZolam (XANAX) 0.5 MG tablet, Take 0.5 mg by mouth At Night As Needed., Disp: , Rfl:   •  beclomethasone (QVAR) 40 MCG/ACT inhaler, Inhale 2 puffs 2 (Two) Times a Day., Disp: , Rfl:   •  digoxin (LANOXIN) 125 MCG tablet, Take 125 mcg by mouth Daily., Disp: , Rfl:   •  diltiaZEM (CARDIZEM) 30 MG tablet, Take 15 mg by mouth Every 6  (Six) Hours., Disp: , Rfl:   •  doxycycline (VIBRAMYCIN) 100 MG capsule, Take 100 mg by mouth Daily With Breakfast & Dinner. Duration: 5 days, Disp: , Rfl:   •  furosemide (LASIX) 40 MG tablet, Take 60 mg by mouth Daily With Breakfast., Disp: , Rfl:   •  ipratropium-albuterol (COMBIVENT RESPIMAT)  MCG/ACT inhaler, Inhale 1 puff 4 (Four) Times a Day As Needed for Wheezing., Disp: , Rfl:   •  ipratropium-albuterol (DUO-NEB) 0.5-2.5 mg/3 ml nebulizer, Take 3 mL by nebulization 4 (Four) Times a Day., Disp: , Rfl:   •  magnesium oxide (MAGOX) 400 (241.3 Mg) MG tablet tablet, Take 400 mg by mouth Daily., Disp: , Rfl:   •  metFORMIN (FORTAMET) 500 MG (OSM) 24 hr tablet, Take 1,000 mg by mouth every night at bedtime., Disp: , Rfl:   •  metFORMIN ER (GLUCOPHAGE-XR) 500 MG 24 hr tablet, Take 500 mg by mouth Every Morning., Disp: , Rfl: 5  •  potassium chloride (KLOR-CON) 20 MEQ packet, Take 20 mEq by mouth Daily., Disp: , Rfl:   •  predniSONE (DELTASONE) 20 MG tablet, Take 40 mg by mouth Daily. Duration: 4 days, Disp: , Rfl:   •  rOPINIRole (REQUIP) 0.5 MG tablet, Take 0.5 mg by mouth every night at bedtime., Disp: , Rfl: 4  •  warfarin (COUMADIN) 3 MG tablet, Take 3 mg by mouth Daily., Disp: , Rfl:   •  cephalexin (KEFLEX) 500 MG capsule, Take 1 capsule by mouth 2 (Two) Times a Day., Disp: 10 capsule, Rfl: 0    Social History     Socioeconomic History   • Marital status:      Spouse name: Monalisa   • Number of children: Not on file   • Years of education: Not on file   • Highest education level: Not on file   Occupational History   • Occupation: RETIRED   Tobacco Use   • Smoking status: Former Smoker     Years: 2.00     Types: Cigarettes   • Smokeless tobacco: Never Used   • Tobacco comment: 5 Cigs/Day x 58+ Yrs   Substance and Sexual Activity   • Alcohol use: No     Comment: Hx Alcohol Abuse   • Drug use: No   • Sexual activity: Defer       FMH:  Negative for aneurysms, dissections or connective tissue  disorders    Review of Systems   Constitutional: Positive for fatigue.   Respiratory: Positive for shortness of breath.    Cardiovascular: Positive for palpitations and leg swelling.   Gastrointestinal: Positive for abdominal pain and constipation.   Genitourinary: Negative for dysuria and flank pain.   Neurological: Positive for dizziness, tremors and weakness. Negative for syncope.   All other systems reviewed and are negative.      Physical Exam:    Vital Signs:  Weight: 71.7 kg (158 lb)   Body mass index is 22.67 kg/m².  Temp: 97.6 °F (36.4 °C)   Heart Rate: 85   BP: 113/78     Physical Exam   Constitutional: He is oriented to person, place, and time. He appears well-developed and well-nourished.   HENT:   Head: Normocephalic and atraumatic.   Nose: Nose normal.   Mouth/Throat: Oropharynx is clear and moist.   Eyes: Conjunctivae are normal. Pupils are equal, round, and reactive to light.   Neck: Normal range of motion. Neck supple. No JVD present. No thyromegaly present.   Cardiovascular: Normal rate, normal heart sounds and intact distal pulses. An irregular rhythm present. PMI is not displaced. Exam reveals no gallop, no friction rub and no decreased pulses.   No murmur heard.  Pulses:       Radial pulses are 2+ on the right side, and 2+ on the left side.        Dorsalis pedis pulses are 2+ on the right side, and 2+ on the left side.   Pulmonary/Chest: Effort normal and breath sounds normal. He has no rales.   Abdominal: Soft. Bowel sounds are normal. He exhibits mass. He exhibits no distension and no ascites. There is no tenderness.       Musculoskeletal: Normal range of motion. He exhibits no tenderness or deformity.   Lymphadenopathy:     He has no cervical adenopathy.   Neurological: He is alert and oriented to person, place, and time. He has normal reflexes.   Skin: Skin is warm and dry. No rash noted. No erythema.   Psychiatric: He has a normal mood and affect. His behavior is normal.   No groin or neck  adenomegaly     Assessment:     Problem List Items Addressed This Visit        Cardiovascular and Mediastinum    Ascending aortic aneurysm (CMS/HCC)    PAF (paroxysmal atrial fibrillation) (CMS/HCC)    Abdominal aortic aneurysm (AAA) without rupture (CMS/HCC) - Primary    Relevant Orders    Ambulatory Referral to Vascular Surgery    Non-rheumatic tricuspid valve insufficiency       Respiratory    Incidental lung nodule, > 3mm and < 8mm       Digestive    Alcoholic cirrhosis of liver without ascites (CMS/HCC)          1. Close to 5 cm ascending aortic aneurysm without dissection or ulceration, asymptomatic  2. 4.9 cm infrarenal abdominal aortic aneurysm with a saccular component  3. At least moderate COPD  4. Severe anxiety and panic attacks  5. Coronary artery disease status post PCI  6. Gallstone disease, questionable distention or chronic cholecystitis  7. Frailty and very debilitated from recent admissions  8. History of recent pneumonia  9. Lung nodules, unchanged  10. Alcohol-related cirrhosis, severity unknown    Recommendation/Plan:     I discussed with the patient and family the findings, the significance of the 2 aneurysms and his present situation.  He is very debilitated and his thoracic aneurysm is barely within guidelines for a surgical indication.  The patient insisted that he wants to have surgery because he is very afraid of rupture.  I reassured him that the diameter of the 5 to 5.5 cm is usually when we indicate surgery.  He is not a surgical candidate at this point due to multiple comorbidities and recent admission with extreme weakness.  His infrarenal aneurysm is close to 5 cm and with a saccular component may be and I think he should be evaluated by vascular surgery and may be treated with an endograft stent.  He will need significant recovery of his present status before he will tolerate an open surgery.  He will need a cardiac work-up to see the status of his coronaries and the severity of  his cirrhosis and meld score should be calculated to see if he will be a surgical candidate.  He is very anxious about my recommendation and he was attempting to be done right away which I do not think is his best interest.  He has lost significant weight and he is in a wheelchair.  He needs time to recover.  I will see him in my office in 3 and 6 months in follow-up    Thank you for allowing me to participate in his care.    Regards,    Julián Mina M.D.

## 2019-07-02 ENCOUNTER — TRANSCRIBE ORDERS (OUTPATIENT)
Dept: ADMINISTRATIVE | Facility: HOSPITAL | Age: 79
End: 2019-07-02

## 2019-07-02 ENCOUNTER — TELEPHONE (OUTPATIENT)
Dept: CARDIOLOGY | Facility: CLINIC | Age: 79
End: 2019-07-02

## 2019-07-02 DIAGNOSIS — I71.40 ABDOMINAL AORTIC ANEURYSM (AAA) WITHOUT RUPTURE (HCC): Primary | ICD-10-CM

## 2019-07-02 NOTE — TELEPHONE ENCOUNTER
Patient needs stress test ideally stress Myoview before the procedure since they are going to use general anesthesia

## 2019-07-02 NOTE — TELEPHONE ENCOUNTER
Ilda christensen/ Dr. Victoria office [phone] fax# [phone]. Pt needing cardiac clearance for Endovascular Triple A under general anesthesia. LOV 5/20/19. He is on coumadin but looks like his PCP manages. Last INR: 2.24 was 6/21/19. How long can he hold the warafrin?

## 2019-07-03 ENCOUNTER — OFFICE VISIT (OUTPATIENT)
Dept: GASTROENTEROLOGY | Facility: CLINIC | Age: 79
End: 2019-07-03

## 2019-07-03 VITALS
DIASTOLIC BLOOD PRESSURE: 64 MMHG | SYSTOLIC BLOOD PRESSURE: 110 MMHG | TEMPERATURE: 98.1 F | BODY MASS INDEX: 22.05 KG/M2 | WEIGHT: 154 LBS | HEIGHT: 70 IN

## 2019-07-03 DIAGNOSIS — K70.30 ALCOHOLIC CIRRHOSIS OF LIVER WITHOUT ASCITES (HCC): Primary | ICD-10-CM

## 2019-07-03 DIAGNOSIS — R63.4 WEIGHT LOSS: ICD-10-CM

## 2019-07-03 DIAGNOSIS — J44.9 COPD MIXED TYPE (HCC): ICD-10-CM

## 2019-07-03 DIAGNOSIS — I71.40 ABDOMINAL AORTIC ANEURYSM (AAA) WITHOUT RUPTURE (HCC): ICD-10-CM

## 2019-07-03 PROCEDURE — 99203 OFFICE O/P NEW LOW 30 MIN: CPT | Performed by: INTERNAL MEDICINE

## 2019-07-03 NOTE — PROGRESS NOTES
Chief Complaint   Patient presents with   • Cirrhosis       History of Present Illness: 78-year-old male who is about to have a stent for an abdominal aortic aneurysm.  He has a fib (on coumadin), CHF, COPD, DM  He was first told that he had cirrhosis 13 yrs ago at which time he stopped drinking ETOH. He is falling and lives with one daughter. No appetite. NO abdominal or chest pain. No abdominal or leg swelling. No rectal bleeding or melena. No diarrhea, some consitpation. He last had a colonsocopy 10+ yrs ago. No nausea or vomiting. NO dysphagia. He has lost weight: 167 - to 150's. Last EGD 10+ yrs ago.  To have a CT abd with IV dye in 5 days.     Past Medical History:   Diagnosis Date   • AAA (abdominal aortic aneurysm) (CMS/HCC)    • Alcohol abuse     Hx   • Alcoholic cirrhosis (CMS/HCC)    • Anxiety     Welbutrin   • Aortic aneurysm (CMS/HCC) 09/19/2018    Ascending Measuring 4.7CM Noted on CT Chest   • Atrial fibrillation (CMS/HCC)    • Cardiomegaly 09/19/2018    CT Chest   • Centrilobular emphysema (CMS/HCC) 09/19/2018    On CT Chest   • CHF (congestive heart failure) (CMS/HCC)    • Cholecystolithiasis 09/19/2018    On CT Chest   • Chronic liver disease    • COPD (chronic obstructive pulmonary disease) (CMS/HCC)    • Descending thoracic aortic aneurysm (CMS/HCC) 09/19/2018    On Chest CT-4.2CM Near Diaphragmatic Hiatus and 4.8CM at Main Pulmonary Artery   • History of echocardiogram 12/20/16 & 8/2/17- Moo   • History of EKG 12/2016    Atrial Fib   • Hx of chest x-ray 12/18/2016    1 V   • Hx of chest x-ray 08/2011   • Hx of CT scan of chest 09/19/18-[facility]    Enlarged Heart Size; Coronary Artery and Aortic Atherosclerotic Calcifications; Ascending Aorta Measuring 4.7CM;    • Long term current use of anticoagulant    • Orthostatic hypertension    • Paraseptal emphysema (CMS/HCC) 09/19/2018    On CT Chest   • Pleural nodule 09/19/2018    On CT Chest-6MM   • Pneumonia     Hx   • Pulmonary nodules 09/19/2018     R on CT Chest   • Respiratory failure (CMS/HCC) Hypoxic       Past Surgical History:   Procedure Laterality Date   • BRONCHOSCOPY  1/12/17- Moo Moreno   • COLONOSCOPY  08/23/2011    w/EGD-Dr. Tse   • COLONOSCOPY  10/2006    w/EGD-Diony Moy MD   • TONSILLECTOMY           Current Outpatient Medications:   •  ALPRAZolam (XANAX) 0.5 MG tablet, Take 0.5 mg by mouth At Night As Needed., Disp: , Rfl:   •  beclomethasone (QVAR) 40 MCG/ACT inhaler, Inhale 2 puffs 2 (Two) Times a Day., Disp: , Rfl:   •  digoxin (LANOXIN) 125 MCG tablet, Take 125 mcg by mouth Daily., Disp: , Rfl:   •  diltiaZEM (CARDIZEM) 30 MG tablet, Take 15 mg by mouth Every 6 (Six) Hours., Disp: , Rfl:   •  furosemide (LASIX) 40 MG tablet, Take 60 mg by mouth Daily With Breakfast., Disp: , Rfl:   •  ipratropium-albuterol (COMBIVENT RESPIMAT)  MCG/ACT inhaler, Inhale 1 puff 4 (Four) Times a Day As Needed for Wheezing., Disp: , Rfl:   •  ipratropium-albuterol (DUO-NEB) 0.5-2.5 mg/3 ml nebulizer, Take 3 mL by nebulization 4 (Four) Times a Day., Disp: , Rfl:   •  magnesium oxide (MAGOX) 400 (241.3 Mg) MG tablet tablet, Take 400 mg by mouth Daily., Disp: , Rfl:   •  metFORMIN (FORTAMET) 500 MG (OSM) 24 hr tablet, Take 1,000 mg by mouth every night at bedtime., Disp: , Rfl:   •  metFORMIN ER (GLUCOPHAGE-XR) 500 MG 24 hr tablet, Take 500 mg by mouth Every Morning., Disp: , Rfl: 5  •  potassium chloride (KLOR-CON) 20 MEQ packet, Take 20 mEq by mouth Daily., Disp: , Rfl:   •  rOPINIRole (REQUIP) 0.5 MG tablet, Take 0.5 mg by mouth every night at bedtime., Disp: , Rfl: 4  •  warfarin (COUMADIN) 3 MG tablet, Take 3 mg by mouth Daily., Disp: , Rfl:   •  cephalexin (KEFLEX) 500 MG capsule, Take 1 capsule by mouth 2 (Two) Times a Day., Disp: 10 capsule, Rfl: 0  •  doxycycline (VIBRAMYCIN) 100 MG capsule, Take 100 mg by mouth Daily With Breakfast & Dinner. Duration: 5 days, Disp: , Rfl:     Allergies   Allergen Reactions   • Gabapentin Other (See  Comments) and Confusion     Vision loss         History reviewed. No pertinent family history.    Social History     Socioeconomic History   • Marital status:      Spouse name: Monalisa   • Number of children: Not on file   • Years of education: Not on file   • Highest education level: Not on file   Occupational History   • Occupation: RETIRED   Tobacco Use   • Smoking status: Former Smoker     Years: 2.00     Types: Cigarettes   • Smokeless tobacco: Never Used   • Tobacco comment: 5 Cigs/Day x 58+ Yrs   Substance and Sexual Activity   • Alcohol use: No     Comment: Hx Alcohol Abuse   • Drug use: No   • Sexual activity: Defer       Review of Systems   All other systems reviewed and are negative.      Vitals:    07/03/19 1326   BP: 110/64   Temp: 98.1 °F (36.7 °C)       Physical Exam   Constitutional: He is oriented to person, place, and time. He appears well-developed and well-nourished. No distress.   HENT:   Head: Normocephalic and atraumatic. Hair is normal.   Right Ear: Hearing, tympanic membrane, external ear and ear canal normal.   Left Ear: Hearing, tympanic membrane, external ear and ear canal normal.   Nose: No sinus tenderness or nasal deformity.   Mouth/Throat: Uvula is midline, oropharynx is clear and moist and mucous membranes are normal. No oral lesions. No uvula swelling.   Eyes: Conjunctivae, EOM and lids are normal. Pupils are equal, round, and reactive to light. Right eye exhibits no discharge. Left eye exhibits no discharge. No scleral icterus. Right eye exhibits normal extraocular motion and no nystagmus. Left eye exhibits normal extraocular motion and no nystagmus.   Neck: Normal range of motion. Neck supple. No JVD present. No thyromegaly present.   Cardiovascular: Normal rate, regular rhythm, normal heart sounds, intact distal pulses and normal pulses. Exam reveals no gallop.   No murmur heard.  Pulmonary/Chest: Effort normal and breath sounds normal. No respiratory distress. He has no  wheezes. He has no rales. He exhibits no tenderness.   Abdominal: Soft. Bowel sounds are normal. He exhibits no distension and no mass. There is no tenderness. There is no guarding. No hernia.   Genitourinary: Rectal exam shows guaiac negative stool.   Musculoskeletal: Normal range of motion. He exhibits no edema, tenderness or deformity.   Lymphadenopathy:     He has no cervical adenopathy.   Neurological: He is alert and oriented to person, place, and time. He has normal reflexes. He displays normal reflexes. No cranial nerve deficit. He exhibits normal muscle tone. Coordination normal.   Skin: Skin is warm and dry. No rash noted. He is not diaphoretic.   Psychiatric: He has a normal mood and affect. His behavior is normal. Judgment and thought content normal.   Vitals reviewed.      Giles was seen today for cirrhosis.    Diagnoses and all orders for this visit:    Alcoholic cirrhosis of liver without ascites (CMS/HCC)  -     CBC & Differential  -     Comprehensive Metabolic Panel  -     TSH  -     Protime-INR  -     AFP Tumor Marker    Abdominal aortic aneurysm (AAA) without rupture (CMS/HCC)  -     CBC & Differential  -     Comprehensive Metabolic Panel  -     TSH  -     Protime-INR  -     AFP Tumor Marker    COPD mixed type (CMS/HCC)  -     CBC & Differential  -     Comprehensive Metabolic Panel  -     TSH  -     Protime-INR  -     AFP Tumor Marker    Weight loss  -     CBC & Differential  -     Comprehensive Metabolic Panel  -     TSH  -     Protime-INR  -     AFP Tumor Marker      Assessment:  1. H/o cirrhosis from ETOH  2) On coumadin  3) Dementia    Recommendations:  1. Labs: AFP, CMP, CBC, PT  2) I will call Dr. Victoria to see why he referred him?    Return if symptoms worsen or fail to improve.    Ciro Muller MD  7/3/2019

## 2019-07-04 ENCOUNTER — TELEPHONE (OUTPATIENT)
Dept: GASTROENTEROLOGY | Facility: CLINIC | Age: 79
End: 2019-07-04

## 2019-07-04 LAB
AFP-TM SERPL-MCNC: 7.4 NG/ML (ref 0–8.3)
ALBUMIN SERPL-MCNC: 3.7 G/DL (ref 3.5–5.2)
ALBUMIN/GLOB SERPL: 1.2 G/DL
ALP SERPL-CCNC: 636 U/L (ref 39–117)
ALT SERPL-CCNC: 147 U/L (ref 1–41)
AST SERPL-CCNC: 53 U/L (ref 1–40)
BASOPHILS # BLD AUTO: 0.04 10*3/MM3 (ref 0–0.2)
BASOPHILS NFR BLD AUTO: 0.2 % (ref 0–1.5)
BILIRUB SERPL-MCNC: 1.3 MG/DL (ref 0.2–1.2)
BUN SERPL-MCNC: 16 MG/DL (ref 8–23)
BUN/CREAT SERPL: 15.8 (ref 7–25)
CALCIUM SERPL-MCNC: 9.2 MG/DL (ref 8.6–10.5)
CHLORIDE SERPL-SCNC: 93 MMOL/L (ref 98–107)
CO2 SERPL-SCNC: 28.6 MMOL/L (ref 22–29)
CREAT SERPL-MCNC: 1.01 MG/DL (ref 0.76–1.27)
EOSINOPHIL # BLD AUTO: 0.03 10*3/MM3 (ref 0–0.4)
EOSINOPHIL NFR BLD AUTO: 0.2 % (ref 0.3–6.2)
ERYTHROCYTE [DISTWIDTH] IN BLOOD BY AUTOMATED COUNT: 15.6 % (ref 12.3–15.4)
GLOBULIN SER CALC-MCNC: 3 GM/DL
GLUCOSE SERPL-MCNC: 144 MG/DL (ref 65–99)
HCT VFR BLD AUTO: 43.4 % (ref 37.5–51)
HGB BLD-MCNC: 13.5 G/DL (ref 13–17.7)
IMM GRANULOCYTES # BLD AUTO: 0.13 10*3/MM3 (ref 0–0.05)
IMM GRANULOCYTES NFR BLD AUTO: 0.7 % (ref 0–0.5)
INR PPP: 2.8 (ref 0.9–1.1)
LYMPHOCYTES # BLD AUTO: 0.8 10*3/MM3 (ref 0.7–3.1)
LYMPHOCYTES NFR BLD AUTO: 4.3 % (ref 19.6–45.3)
MCH RBC QN AUTO: 31.1 PG (ref 26.6–33)
MCHC RBC AUTO-ENTMCNC: 31.1 G/DL (ref 31.5–35.7)
MCV RBC AUTO: 100 FL (ref 79–97)
MONOCYTES # BLD AUTO: 0.72 10*3/MM3 (ref 0.1–0.9)
MONOCYTES NFR BLD AUTO: 3.9 % (ref 5–12)
NEUTROPHILS # BLD AUTO: 16.71 10*3/MM3 (ref 1.7–7)
NEUTROPHILS NFR BLD AUTO: 90.7 % (ref 42.7–76)
NRBC BLD AUTO-RTO: 0.2 /100 WBC (ref 0–0.2)
PLATELET # BLD AUTO: 369 10*3/MM3 (ref 140–450)
POTASSIUM SERPL-SCNC: 4.1 MMOL/L (ref 3.5–5.2)
PROT SERPL-MCNC: 6.7 G/DL (ref 6–8.5)
PROTHROMBIN TIME: 29.2 SECONDS (ref 11.7–14.2)
RBC # BLD AUTO: 4.34 10*6/MM3 (ref 4.14–5.8)
SODIUM SERPL-SCNC: 138 MMOL/L (ref 136–145)
TSH SERPL DL<=0.005 MIU/L-ACNC: 1.7 MIU/ML (ref 0.27–4.2)
WBC # BLD AUTO: 18.43 10*3/MM3 (ref 3.4–10.8)

## 2019-07-04 NOTE — TELEPHONE ENCOUNTER
Please see if [facility] has enough blood still in the lab (from 7/3/19) to run a hepatitis profile for hep A, hep B, and hep C to evaluate his elevated LFT's. Sukhdeep valdez

## 2019-07-05 ENCOUNTER — TELEPHONE (OUTPATIENT)
Dept: GASTROENTEROLOGY | Facility: CLINIC | Age: 79
End: 2019-07-05

## 2019-07-05 DIAGNOSIS — R79.89 ELEVATED LFTS: Primary | ICD-10-CM

## 2019-07-05 DIAGNOSIS — K83.8 DILATED BILE DUCT: ICD-10-CM

## 2019-07-05 NOTE — TELEPHONE ENCOUNTER
"----- Message from Ciro Muller MD sent at 7/4/2019  3:32 PM EDT -----  Please call his daughter (he has dementia) and tell her that his labs show an elevated WBC (I don't know why). His LFT\"s are elevated. I would recommend that we do an MRCP (with and without IV contrast) to evaluate his elevated LFT's, elevated WBC, and dilated bile ducts seen on his last US of the liver. If she is OK with that then please order the MRCP. On the day of the MRCP have the lab draw: CBC, CMP, hepatitis profile for hep A, hep B, and hep C. FAX these records to his PCP.. Judy. courtney  "

## 2019-07-05 NOTE — TELEPHONE ENCOUNTER
Call to daughter, Sue (see hipaa).  Advise per Dr Muller that labs show an elevated WBC - not sure why.  LFTs are elevated.  Recommend MRCP to evaluate these results, and dilated bile ducts seen on last US.    Verb understanding.  Agreeable to proceed.    Order placed for MRCP without and without IV contrast) with instructions to obtain cbc, cmp, acute hep panel for hep A, B, and C - message to Dr Muller.    Labs faxed via epic to DR Valeriano Lewis.

## 2019-07-08 ENCOUNTER — TELEPHONE (OUTPATIENT)
Dept: GASTROENTEROLOGY | Facility: CLINIC | Age: 79
End: 2019-07-08

## 2019-07-08 ENCOUNTER — APPOINTMENT (OUTPATIENT)
Dept: CT IMAGING | Facility: HOSPITAL | Age: 79
End: 2019-07-08

## 2019-07-08 NOTE — TELEPHONE ENCOUNTER
The tests are going to be done for different reasons. The CT abd does not see in the bile ducts well but the IV dye associated with both tests could hurt his kidneys so lets hold off on the MRI for now and see what the CT angiogram of the abdomen shows. It may give us some good information about his liver. Have the CT angiogram of the abdomen done but not the MRI till I say so. Call this office the day after the CT abdomen is done and I will call the daughter to go over the results. We could then discuss whether an MRI of the liver/bile ducts should be done or not. thx.kjh

## 2019-07-08 NOTE — TELEPHONE ENCOUNTER
Tried calling Ilda again, phone rang several times then went to busy signal. I faxed document over stating that pt needs a stress [place] before clearance is given.

## 2019-07-08 NOTE — TELEPHONE ENCOUNTER
Called pt's daughter and she reports her father is currently scheduled for a ct angiogram abd/pelvis.  She states Dr Muller has ordered a mri of the abd.  She is asking if her father needs both of these tests or would the ct angiogram be sufficient.  Advised will send message to Dr Muller.   Pt's daughter verb understanding.

## 2019-07-08 NOTE — TELEPHONE ENCOUNTER
----- Message from Dee Dee Baugh sent at 7/8/2019  8:29 AM EDT -----  Regarding: Questions  Contact: [phone]  Pt daughter has questions regarding CT and MRI.

## 2019-07-09 LAB
HAV IGM SERPL QL IA: NEGATIVE
HBV CORE IGM SERPL QL IA: NEGATIVE
HBV SURFACE AG SERPL QL IA: NEGATIVE
HCV AB S/CO SERPL IA: <0.1 S/CO RATIO (ref 0–0.9)
Lab: NORMAL
SPECIMEN STATUS: NORMAL

## 2019-07-09 NOTE — TELEPHONE ENCOUNTER
Call to daughter, Sue (see hipaa).  Advise per DR Muller that tests are going to be done for differents reasons.  CT abd does not see in the bile ducts well, but the IV dye associated with both tests could hurt pt's kidneys, so hold off on MRI for now and see what the CT shows.  May give good info about liver.  Have CT done, but not the MRI until DR Muller says so.  Call this office the day after CT and Dr Muller will call Sue to go over the results.  Could then discuss whether an MRI of the liver/bile ducts should be done or not.  Verb understanding.

## 2019-07-11 ENCOUNTER — HOSPITAL ENCOUNTER (OUTPATIENT)
Dept: CT IMAGING | Facility: HOSPITAL | Age: 79
Discharge: HOME | End: 2019-07-11
Admitting: SURGERY

## 2019-07-11 DIAGNOSIS — I71.40 ABDOMINAL AORTIC ANEURYSM (AAA) WITHOUT RUPTURE (HCC): ICD-10-CM

## 2019-07-11 PROCEDURE — 74174 CTA ABD&PLVS W/CONTRAST: CPT

## 2019-07-11 PROCEDURE — 0 IOPAMIDOL PER 1 ML: Performed by: SURGERY

## 2019-07-11 RX ADMIN — IOPAMIDOL 100 ML: 755 INJECTION, SOLUTION INTRAVENOUS at 10:00

## 2019-07-16 ENCOUNTER — TELEPHONE (OUTPATIENT)
Dept: CARDIOLOGY | Facility: CLINIC | Age: 79
End: 2019-07-16

## 2019-07-16 DIAGNOSIS — I48.0 PAROXYSMAL ATRIAL FIBRILLATION (HCC): ICD-10-CM

## 2019-07-16 DIAGNOSIS — I25.10 CORONARY ARTERY DISEASE INVOLVING NATIVE CORONARY ARTERY OF NATIVE HEART, ANGINA PRESENCE UNSPECIFIED: Primary | ICD-10-CM

## 2019-07-16 NOTE — TELEPHONE ENCOUNTER
Check with the patient if okay to schedule the stress test in the next 1 to 2 weeks  And let me know for orders

## 2019-07-16 NOTE — TELEPHONE ENCOUNTER
Dr Victoria's office called. Patient needs to have a myocardial stress test prior to being cleared for Endovascular Triple A under general anesthesia, per Dr Gramajo. Patient will see Dr Victoria 09/03/2019.     Order needs to be put in.

## 2019-07-26 ENCOUNTER — LAB (OUTPATIENT)
Dept: LAB | Facility: HOSPITAL | Age: 79
End: 2019-07-26

## 2019-07-26 ENCOUNTER — TRANSCRIBE ORDERS (OUTPATIENT)
Dept: ADMINISTRATIVE | Facility: HOSPITAL | Age: 79
End: 2019-07-26

## 2019-07-26 DIAGNOSIS — I71.40 ABDOMINAL AORTIC ANEURYSM (AAA) WITHOUT RUPTURE (HCC): Primary | ICD-10-CM

## 2019-07-26 DIAGNOSIS — I71.40 ABDOMINAL AORTIC ANEURYSM (AAA) WITHOUT RUPTURE (HCC): ICD-10-CM

## 2019-07-26 LAB
ALBUMIN SERPL-MCNC: 3.2 G/DL (ref 3.5–4.8)
ALBUMIN/GLOB SERPL: 0.8 G/DL (ref 1–1.7)
ALP SERPL-CCNC: 113 U/L (ref 32–91)
ALT SERPL W P-5'-P-CCNC: 24 U/L (ref 17–63)
ANION GAP SERPL CALCULATED.3IONS-SCNC: 15.9 MMOL/L (ref 5–15)
AST SERPL-CCNC: 34 U/L (ref 15–41)
BASOPHILS # BLD AUTO: 0.1 10*3/MM3 (ref 0–0.2)
BASOPHILS NFR BLD AUTO: 0.9 % (ref 0–1.5)
BILIRUB SERPL-MCNC: 1.4 MG/DL (ref 0.3–1.2)
BUN BLD-MCNC: 16 MG/DL (ref 8–20)
BUN/CREAT SERPL: 17.8 (ref 6.2–20.3)
CALCIUM SPEC-SCNC: 9.2 MG/DL (ref 8.9–10.3)
CHLORIDE SERPL-SCNC: 93 MMOL/L (ref 101–111)
CO2 SERPL-SCNC: 30 MMOL/L (ref 22–32)
CREAT BLD-MCNC: 0.9 MG/DL (ref 0.7–1.2)
DEPRECATED RDW RBC AUTO: 52.9 FL (ref 37–54)
EOSINOPHIL # BLD AUTO: 0.1 10*3/MM3 (ref 0–0.4)
EOSINOPHIL NFR BLD AUTO: 1.1 % (ref 0.3–6.2)
ERYTHROCYTE [DISTWIDTH] IN BLOOD BY AUTOMATED COUNT: 15.3 % (ref 12.3–15.4)
GFR SERPL CREATININE-BSD FRML MDRD: 82 ML/MIN/1.73
GLOBULIN UR ELPH-MCNC: 4.1 GM/DL (ref 2.5–3.8)
GLUCOSE BLD-MCNC: 133 MG/DL (ref 65–99)
HCT VFR BLD AUTO: 40.9 % (ref 37.5–51)
HGB BLD-MCNC: 13.3 G/DL (ref 13–17.7)
LYMPHOCYTES # BLD AUTO: 1.5 10*3/MM3 (ref 0.7–3.1)
LYMPHOCYTES NFR BLD AUTO: 18.1 % (ref 19.6–45.3)
MCH RBC QN AUTO: 31.7 PG (ref 26.6–33)
MCHC RBC AUTO-ENTMCNC: 32.6 G/DL (ref 31.5–35.7)
MCV RBC AUTO: 97.2 FL (ref 79–97)
MONOCYTES # BLD AUTO: 0.8 10*3/MM3 (ref 0.1–0.9)
MONOCYTES NFR BLD AUTO: 9.6 % (ref 5–12)
NEUTROPHILS # BLD AUTO: 6 10*3/MM3 (ref 1.7–7)
NEUTROPHILS NFR BLD AUTO: 70.3 % (ref 42.7–76)
NRBC BLD AUTO-RTO: 0.1 /100 WBC (ref 0–0.2)
PLATELET # BLD AUTO: 309 10*3/MM3 (ref 140–450)
PMV BLD AUTO: 7.6 FL (ref 6–12)
POTASSIUM BLD-SCNC: 3.9 MMOL/L (ref 3.6–5.1)
PROT SERPL-MCNC: 7.3 G/DL (ref 6.1–7.9)
RBC # BLD AUTO: 4.21 10*6/MM3 (ref 4.14–5.8)
SODIUM BLD-SCNC: 135 MMOL/L (ref 136–144)
WBC NRBC COR # BLD: 8.5 10*3/MM3 (ref 3.4–10.8)

## 2019-07-26 PROCEDURE — 80053 COMPREHEN METABOLIC PANEL: CPT

## 2019-07-26 PROCEDURE — 85025 COMPLETE CBC W/AUTO DIFF WBC: CPT

## 2019-07-26 PROCEDURE — 36415 COLL VENOUS BLD VENIPUNCTURE: CPT

## 2019-07-29 ENCOUNTER — HOSPITAL ENCOUNTER (OUTPATIENT)
Dept: CARDIOLOGY | Facility: HOSPITAL | Age: 79
Discharge: HOME | End: 2019-07-29

## 2019-07-29 DIAGNOSIS — I25.10 CORONARY ARTERY DISEASE INVOLVING NATIVE CORONARY ARTERY OF NATIVE HEART, ANGINA PRESENCE UNSPECIFIED: ICD-10-CM

## 2019-07-29 DIAGNOSIS — I48.0 PAROXYSMAL ATRIAL FIBRILLATION (HCC): ICD-10-CM

## 2019-07-29 LAB
BH CV STRESS COMMENTS STAGE 1: NORMAL
BH CV STRESS DOSE REGADENOSON STAGE 1: 0.4
BH CV STRESS DURATION MIN STAGE 1: 0
BH CV STRESS DURATION SEC STAGE 1: 10
BH CV STRESS PROTOCOL 1: NORMAL
BH CV STRESS RECOVERY BP: NORMAL MMHG
BH CV STRESS RECOVERY HR: 91 BPM
BH CV STRESS STAGE 1: 1
LV EF NUC BP: 47 %
MAXIMAL PREDICTED HEART RATE: 143 BPM
STRESS BASELINE BP: NORMAL MMHG
STRESS BASELINE HR: 84 BPM
STRESS TARGET HR: 122 BPM

## 2019-07-29 PROCEDURE — 78452 HT MUSCLE IMAGE SPECT MULT: CPT | Performed by: INTERNAL MEDICINE

## 2019-07-29 PROCEDURE — 0 TECHNETIUM SESTAMIBI: Performed by: INTERNAL MEDICINE

## 2019-07-29 PROCEDURE — 78452 HT MUSCLE IMAGE SPECT MULT: CPT

## 2019-07-29 PROCEDURE — A9500 TC99M SESTAMIBI: HCPCS | Performed by: INTERNAL MEDICINE

## 2019-07-29 PROCEDURE — 93018 CV STRESS TEST I&R ONLY: CPT | Performed by: INTERNAL MEDICINE

## 2019-07-29 PROCEDURE — 93017 CV STRESS TEST TRACING ONLY: CPT

## 2019-07-29 PROCEDURE — 93016 CV STRESS TEST SUPVJ ONLY: CPT | Performed by: INTERNAL MEDICINE

## 2019-07-29 PROCEDURE — 25010000002 REGADENOSON 0.4 MG/5ML SOLUTION: Performed by: INTERNAL MEDICINE

## 2019-07-29 RX ADMIN — TECHNETIUM TC 99M SESTAMIBI 1 DOSE: 1 INJECTION INTRAVENOUS at 09:30

## 2019-07-29 RX ADMIN — REGADENOSON 0.4 MG: 0.08 INJECTION, SOLUTION INTRAVENOUS at 11:15

## 2019-08-30 ENCOUNTER — OFFICE VISIT (OUTPATIENT)
Dept: CARDIOLOGY | Facility: CLINIC | Age: 79
End: 2019-08-30

## 2019-08-30 VITALS
WEIGHT: 150 LBS | OXYGEN SATURATION: 95 % | SYSTOLIC BLOOD PRESSURE: 108 MMHG | HEIGHT: 70 IN | BODY MASS INDEX: 21.47 KG/M2 | DIASTOLIC BLOOD PRESSURE: 65 MMHG | HEART RATE: 74 BPM

## 2019-08-30 DIAGNOSIS — I71.40 ABDOMINAL AORTIC ANEURYSM (AAA) WITHOUT RUPTURE (HCC): ICD-10-CM

## 2019-08-30 DIAGNOSIS — I71.21 ASCENDING AORTIC ANEURYSM (HCC): Primary | ICD-10-CM

## 2019-08-30 DIAGNOSIS — I48.0 PAF (PAROXYSMAL ATRIAL FIBRILLATION) (HCC): ICD-10-CM

## 2019-08-30 PROCEDURE — 99214 OFFICE O/P EST MOD 30 MIN: CPT | Performed by: INTERNAL MEDICINE

## 2019-08-30 RX ORDER — ALPRAZOLAM 0.25 MG/1
0.5 TABLET ORAL 3 TIMES DAILY PRN
Refills: 2 | COMMUNITY
Start: 2019-08-05

## 2019-09-03 ENCOUNTER — LAB (OUTPATIENT)
Dept: LAB | Facility: HOSPITAL | Age: 79
End: 2019-09-03

## 2019-09-03 ENCOUNTER — TRANSCRIBE ORDERS (OUTPATIENT)
Dept: ADMINISTRATIVE | Facility: HOSPITAL | Age: 79
End: 2019-09-03

## 2019-09-03 ENCOUNTER — APPOINTMENT (OUTPATIENT)
Dept: VASCULAR SURGERY | Facility: HOSPITAL | Age: 79
End: 2019-09-03

## 2019-09-03 DIAGNOSIS — I71.40 ABDOMINAL AORTIC ANEURYSM (AAA) WITHOUT RUPTURE (HCC): ICD-10-CM

## 2019-09-03 DIAGNOSIS — I71.40 ABDOMINAL AORTIC ANEURYSM (AAA) WITHOUT RUPTURE (HCC): Primary | ICD-10-CM

## 2019-09-03 LAB
ALBUMIN SERPL-MCNC: 3.5 G/DL (ref 3.5–4.8)
ALBUMIN/GLOB SERPL: 1 G/DL (ref 1–1.7)
ALP SERPL-CCNC: 76 U/L (ref 32–91)
ALT SERPL W P-5'-P-CCNC: 21 U/L (ref 17–63)
ANION GAP SERPL CALCULATED.3IONS-SCNC: 18.7 MMOL/L (ref 5–15)
AST SERPL-CCNC: 23 U/L (ref 15–41)
BASOPHILS # BLD AUTO: 0 10*3/MM3 (ref 0–0.2)
BASOPHILS NFR BLD AUTO: 0.6 % (ref 0–1.5)
BILIRUB SERPL-MCNC: 1 MG/DL (ref 0.3–1.2)
BUN BLD-MCNC: 20 MG/DL (ref 8–20)
BUN/CREAT SERPL: 20 (ref 6.2–20.3)
CALCIUM SPEC-SCNC: 9.2 MG/DL (ref 8.9–10.3)
CHLORIDE SERPL-SCNC: 91 MMOL/L (ref 101–111)
CO2 SERPL-SCNC: 30 MMOL/L (ref 22–32)
CREAT BLD-MCNC: 1 MG/DL (ref 0.7–1.2)
DEPRECATED RDW RBC AUTO: 50.3 FL (ref 37–54)
EOSINOPHIL # BLD AUTO: 0.2 10*3/MM3 (ref 0–0.4)
EOSINOPHIL NFR BLD AUTO: 2.3 % (ref 0.3–6.2)
ERYTHROCYTE [DISTWIDTH] IN BLOOD BY AUTOMATED COUNT: 14.6 % (ref 12.3–15.4)
GFR SERPL CREATININE-BSD FRML MDRD: 72 ML/MIN/1.73
GLOBULIN UR ELPH-MCNC: 3.6 GM/DL (ref 2.5–3.8)
GLUCOSE BLD-MCNC: 126 MG/DL (ref 65–99)
HCT VFR BLD AUTO: 40.1 % (ref 37.5–51)
HGB BLD-MCNC: 13.6 G/DL (ref 13–17.7)
LYMPHOCYTES # BLD AUTO: 1.9 10*3/MM3 (ref 0.7–3.1)
LYMPHOCYTES NFR BLD AUTO: 22.7 % (ref 19.6–45.3)
MCH RBC QN AUTO: 32.9 PG (ref 26.6–33)
MCHC RBC AUTO-ENTMCNC: 33.8 G/DL (ref 31.5–35.7)
MCV RBC AUTO: 97.3 FL (ref 79–97)
MONOCYTES # BLD AUTO: 0.6 10*3/MM3 (ref 0.1–0.9)
MONOCYTES NFR BLD AUTO: 7.4 % (ref 5–12)
NEUTROPHILS # BLD AUTO: 5.8 10*3/MM3 (ref 1.7–7)
NEUTROPHILS NFR BLD AUTO: 67 % (ref 42.7–76)
NRBC BLD AUTO-RTO: 0.1 /100 WBC (ref 0–0.2)
PLATELET # BLD AUTO: 321 10*3/MM3 (ref 140–450)
PMV BLD AUTO: 7 FL (ref 6–12)
POTASSIUM BLD-SCNC: 3.7 MMOL/L (ref 3.6–5.1)
PROT SERPL-MCNC: 7.1 G/DL (ref 6.1–7.9)
RBC # BLD AUTO: 4.12 10*6/MM3 (ref 4.14–5.8)
SODIUM BLD-SCNC: 136 MMOL/L (ref 136–144)
WBC NRBC COR # BLD: 8.6 10*3/MM3 (ref 3.4–10.8)

## 2019-09-03 PROCEDURE — G0463 HOSPITAL OUTPT CLINIC VISIT: HCPCS

## 2019-09-03 PROCEDURE — 85025 COMPLETE CBC W/AUTO DIFF WBC: CPT

## 2019-09-03 PROCEDURE — 36415 COLL VENOUS BLD VENIPUNCTURE: CPT

## 2019-09-03 PROCEDURE — 80053 COMPREHEN METABOLIC PANEL: CPT

## 2019-09-10 ENCOUNTER — APPOINTMENT (OUTPATIENT)
Dept: PREADMISSION TESTING | Facility: HOSPITAL | Age: 79
End: 2019-09-10

## 2019-09-10 ENCOUNTER — HOSPITAL ENCOUNTER (OUTPATIENT)
Dept: GENERAL RADIOLOGY | Facility: HOSPITAL | Age: 79
Discharge: HOME | End: 2019-09-10
Admitting: SURGERY

## 2019-09-10 VITALS
BODY MASS INDEX: 21.76 KG/M2 | OXYGEN SATURATION: 95 % | SYSTOLIC BLOOD PRESSURE: 115 MMHG | DIASTOLIC BLOOD PRESSURE: 66 MMHG | HEIGHT: 70 IN | WEIGHT: 152 LBS | HEART RATE: 89 BPM

## 2019-09-10 LAB
ABO GROUP BLD: NORMAL
APTT PPP: 30.9 SECONDS (ref 24–31)
BLD GP AB SCN SERPL QL: NEGATIVE
INR PPP: 2.12 (ref 2–3)
PROTHROMBIN TIME: 20.5 SECONDS (ref 19.4–28.5)
RH BLD: POSITIVE
T&S EXPIRATION DATE: NORMAL

## 2019-09-10 PROCEDURE — 93005 ELECTROCARDIOGRAM TRACING: CPT

## 2019-09-10 PROCEDURE — 86901 BLOOD TYPING SEROLOGIC RH(D): CPT | Performed by: SURGERY

## 2019-09-10 PROCEDURE — 36415 COLL VENOUS BLD VENIPUNCTURE: CPT

## 2019-09-10 PROCEDURE — 86901 BLOOD TYPING SEROLOGIC RH(D): CPT

## 2019-09-10 PROCEDURE — 86900 BLOOD TYPING SEROLOGIC ABO: CPT | Performed by: SURGERY

## 2019-09-10 PROCEDURE — 85730 THROMBOPLASTIN TIME PARTIAL: CPT | Performed by: SURGERY

## 2019-09-10 PROCEDURE — 86900 BLOOD TYPING SEROLOGIC ABO: CPT

## 2019-09-10 PROCEDURE — 86850 RBC ANTIBODY SCREEN: CPT | Performed by: SURGERY

## 2019-09-10 PROCEDURE — 71046 X-RAY EXAM CHEST 2 VIEWS: CPT

## 2019-09-10 PROCEDURE — 85610 PROTHROMBIN TIME: CPT | Performed by: SURGERY

## 2019-09-10 PROCEDURE — 87081 CULTURE SCREEN ONLY: CPT | Performed by: SURGERY

## 2019-09-10 RX ORDER — WARFARIN SODIUM 6 MG/1
6 TABLET ORAL
COMMUNITY
End: 2020-07-03

## 2019-09-10 RX ORDER — ACETAMINOPHEN 325 MG/1
650 TABLET ORAL EVERY 6 HOURS PRN
COMMUNITY

## 2019-09-10 NOTE — PAT
Called Dr. Lewis's office to be sure pt ok to be off warfarin 5 days prior to surgery as instructed per Dr. Falk. Dr. Lewis is prescribing MD and monitors INR.  Spoke with Xena at office.  Dr. Lewis out today, but she will address with him tomorrow, 9-11-19

## 2019-09-11 LAB — MRSA SPEC QL CULT: NORMAL

## 2019-09-13 PROCEDURE — 93010 ELECTROCARDIOGRAM REPORT: CPT | Performed by: INTERNAL MEDICINE

## 2019-09-16 ENCOUNTER — TELEPHONE (OUTPATIENT)
Dept: PREADMISSION TESTING | Facility: HOSPITAL | Age: 79
End: 2019-09-16

## 2019-09-16 NOTE — TELEPHONE ENCOUNTER
EKG done 9-10-19 showed A-fib with probable anterior infarct.  Your office note from 8-30-19  stated that stress myoview showed Left ventricle EF 47% reverse redistribution noted in the mid anterior wall inferoseptal wall without any ischemia.  Please review EKG and advise if pt still ok for surgery.   Thank you for your assistance

## 2019-09-17 NOTE — TELEPHONE ENCOUNTER
If patient is not having any symptoms of chest pain should be okay for planned surgery  If any symptoms needs to be evaluated prior to the planned surgery

## 2019-09-20 ENCOUNTER — ANESTHESIA EVENT (OUTPATIENT)
Dept: PERIOP | Facility: HOSPITAL | Age: 79
End: 2019-09-20

## 2019-09-23 ENCOUNTER — ANESTHESIA (OUTPATIENT)
Dept: PERIOP | Facility: HOSPITAL | Age: 79
End: 2019-09-23

## 2019-09-23 ENCOUNTER — APPOINTMENT (OUTPATIENT)
Dept: GENERAL RADIOLOGY | Facility: HOSPITAL | Age: 79
End: 2019-09-23

## 2019-09-23 ENCOUNTER — HOSPITAL ENCOUNTER (INPATIENT)
Facility: HOSPITAL | Age: 79
LOS: 1 days | Discharge: HOME | End: 2019-09-24
Attending: SURGERY | Admitting: SURGERY

## 2019-09-23 PROBLEM — I71.40 AAA (ABDOMINAL AORTIC ANEURYSM) WITHOUT RUPTURE (HCC): Status: ACTIVE | Noted: 2019-09-23

## 2019-09-23 LAB
ACT BLD: 131 SECONDS (ref 89–137)
ACT BLD: 142 SECONDS (ref 89–137)
ACT BLD: 252 SECONDS (ref 89–137)
ACT BLD: 279 SECONDS (ref 89–137)
APTT PPP: 27.2 SECONDS (ref 24–31)
GLUCOSE BLDC GLUCOMTR-MCNC: 131 MG/DL (ref 70–105)
GLUCOSE BLDC GLUCOMTR-MCNC: 138 MG/DL (ref 70–105)
GLUCOSE BLDC GLUCOMTR-MCNC: 156 MG/DL (ref 70–105)
INR PPP: 1.27 (ref 2–3)
PROTHROMBIN TIME: 12.6 SECONDS (ref 19.4–28.5)

## 2019-09-23 PROCEDURE — 85730 THROMBOPLASTIN TIME PARTIAL: CPT | Performed by: SURGERY

## 2019-09-23 PROCEDURE — 25010000002 HEPARIN (PORCINE) PER 1000 UNITS: Performed by: NURSE ANESTHETIST, CERTIFIED REGISTERED

## 2019-09-23 PROCEDURE — 04V03DZ RESTRICTION OF ABDOMINAL AORTA WITH INTRALUMINAL DEVICE, PERCUTANEOUS APPROACH: ICD-10-PCS | Performed by: SURGERY

## 2019-09-23 PROCEDURE — 25010000002 HEPARIN (PORCINE) PER 1000 UNITS: Performed by: SURGERY

## 2019-09-23 PROCEDURE — C1769 GUIDE WIRE: HCPCS | Performed by: SURGERY

## 2019-09-23 PROCEDURE — C1894 INTRO/SHEATH, NON-LASER: HCPCS | Performed by: SURGERY

## 2019-09-23 PROCEDURE — 25010000002 CEFAZOLIN PER 500 MG: Performed by: SURGERY

## 2019-09-23 PROCEDURE — 25010000002 FENTANYL CITRATE (PF) 100 MCG/2ML SOLUTION: Performed by: NURSE ANESTHETIST, CERTIFIED REGISTERED

## 2019-09-23 PROCEDURE — 25010000002 FENTANYL CITRATE (PF) 250 MCG/5ML SOLUTION: Performed by: NURSE ANESTHETIST, CERTIFIED REGISTERED

## 2019-09-23 PROCEDURE — 25010000002 ONDANSETRON PER 1 MG: Performed by: NURSE ANESTHETIST, CERTIFIED REGISTERED

## 2019-09-23 PROCEDURE — 25010000003 CEFAZOLIN PER 500 MG: Performed by: SURGERY

## 2019-09-23 PROCEDURE — C2628 CATHETER, OCCLUSION: HCPCS | Performed by: SURGERY

## 2019-09-23 PROCEDURE — 25010000002 HYDRALAZINE PER 20 MG: Performed by: NURSE ANESTHETIST, CERTIFIED REGISTERED

## 2019-09-23 PROCEDURE — 85347 COAGULATION TIME ACTIVATED: CPT

## 2019-09-23 PROCEDURE — C1768 GRAFT, VASCULAR: HCPCS | Performed by: SURGERY

## 2019-09-23 PROCEDURE — C1760 CLOSURE DEV, VASC: HCPCS | Performed by: SURGERY

## 2019-09-23 PROCEDURE — 76000 FLUOROSCOPY <1 HR PHYS/QHP: CPT

## 2019-09-23 PROCEDURE — 94799 UNLISTED PULMONARY SVC/PX: CPT

## 2019-09-23 PROCEDURE — 82962 GLUCOSE BLOOD TEST: CPT

## 2019-09-23 PROCEDURE — 85610 PROTHROMBIN TIME: CPT | Performed by: SURGERY

## 2019-09-23 PROCEDURE — 25010000002 PROPOFOL 200 MG/20ML EMULSION: Performed by: NURSE ANESTHETIST, CERTIFIED REGISTERED

## 2019-09-23 PROCEDURE — 25010000002 DEXAMETHASONE PER 1 MG: Performed by: NURSE ANESTHETIST, CERTIFIED REGISTERED

## 2019-09-23 PROCEDURE — 25010000002 NALOXONE PER 1 MG: Performed by: NURSE ANESTHETIST, CERTIFIED REGISTERED

## 2019-09-23 PROCEDURE — 25010000002 HYDROMORPHONE PER 4 MG: Performed by: NURSE ANESTHETIST, CERTIFIED REGISTERED

## 2019-09-23 PROCEDURE — 25010000002 PROTAMINE SULFATE PER 10 MG: Performed by: NURSE ANESTHETIST, CERTIFIED REGISTERED

## 2019-09-23 DEVICE — GRFT EXCLDR ILIAC EXT 14.5MMX7CM: Type: IMPLANTABLE DEVICE | Site: AORTA | Status: FUNCTIONAL

## 2019-09-23 DEVICE — GRFT EXCLDR CONTRALAT 12MMX10CM: Type: IMPLANTABLE DEVICE | Site: AORTA | Status: FUNCTIONAL

## 2019-09-23 DEVICE — GRFT EXCLDR C3 TRNK I/LAT 35X14.5MM14CM: Type: IMPLANTABLE DEVICE | Site: AORTA | Status: FUNCTIONAL

## 2019-09-23 RX ORDER — BUPIVACAINE HYDROCHLORIDE 5 MG/ML
INJECTION, SOLUTION PERINEURAL AS NEEDED
Status: DISCONTINUED | OUTPATIENT
Start: 2019-09-23 | End: 2019-09-23 | Stop reason: HOSPADM

## 2019-09-23 RX ORDER — SODIUM CHLORIDE, SODIUM LACTATE, POTASSIUM CHLORIDE, CALCIUM CHLORIDE 600; 310; 30; 20 MG/100ML; MG/100ML; MG/100ML; MG/100ML
75 INJECTION, SOLUTION INTRAVENOUS CONTINUOUS
Status: DISCONTINUED | OUTPATIENT
Start: 2019-09-23 | End: 2019-09-24

## 2019-09-23 RX ORDER — ALPRAZOLAM 0.5 MG/1
0.5 TABLET ORAL NIGHTLY PRN
Status: DISCONTINUED | OUTPATIENT
Start: 2019-09-23 | End: 2019-09-25 | Stop reason: HOSPADM

## 2019-09-23 RX ORDER — HEPARIN SODIUM 1000 [USP'U]/ML
INJECTION, SOLUTION INTRAVENOUS; SUBCUTANEOUS AS NEEDED
Status: DISCONTINUED | OUTPATIENT
Start: 2019-09-23 | End: 2019-09-23 | Stop reason: SURG

## 2019-09-23 RX ORDER — SODIUM CHLORIDE 0.9 % (FLUSH) 0.9 %
3 SYRINGE (ML) INJECTION EVERY 12 HOURS SCHEDULED
Status: DISCONTINUED | OUTPATIENT
Start: 2019-09-23 | End: 2019-09-23 | Stop reason: HOSPADM

## 2019-09-23 RX ORDER — IPRATROPIUM BROMIDE AND ALBUTEROL SULFATE 2.5; .5 MG/3ML; MG/3ML
3 SOLUTION RESPIRATORY (INHALATION)
Status: DISCONTINUED | OUTPATIENT
Start: 2019-09-23 | End: 2019-09-25 | Stop reason: HOSPADM

## 2019-09-23 RX ORDER — HYDRALAZINE HYDROCHLORIDE 20 MG/ML
5 INJECTION INTRAMUSCULAR; INTRAVENOUS
Status: DISCONTINUED | OUTPATIENT
Start: 2019-09-23 | End: 2019-09-23 | Stop reason: HOSPADM

## 2019-09-23 RX ORDER — SODIUM CHLORIDE 9 MG/ML
9 INJECTION, SOLUTION INTRAVENOUS CONTINUOUS PRN
Status: DISCONTINUED | OUTPATIENT
Start: 2019-09-23 | End: 2019-09-23 | Stop reason: HOSPADM

## 2019-09-23 RX ORDER — PHENYLEPHRINE HCL IN 0.9% NACL 0.5 MG/5ML
.5-3 SYRINGE (ML) INTRAVENOUS
Status: DISCONTINUED | OUTPATIENT
Start: 2019-09-23 | End: 2019-09-23 | Stop reason: HOSPADM

## 2019-09-23 RX ORDER — ROCURONIUM BROMIDE 10 MG/ML
INJECTION, SOLUTION INTRAVENOUS AS NEEDED
Status: DISCONTINUED | OUTPATIENT
Start: 2019-09-23 | End: 2019-09-23 | Stop reason: SURG

## 2019-09-23 RX ORDER — EPHEDRINE SULFATE 50 MG/ML
5 INJECTION, SOLUTION INTRAVENOUS ONCE AS NEEDED
Status: DISCONTINUED | OUTPATIENT
Start: 2019-09-23 | End: 2019-09-23 | Stop reason: HOSPADM

## 2019-09-23 RX ORDER — DIGOXIN 125 MCG
125 TABLET ORAL EVERY MORNING
Status: DISCONTINUED | OUTPATIENT
Start: 2019-09-24 | End: 2019-09-25 | Stop reason: HOSPADM

## 2019-09-23 RX ORDER — HYDROMORPHONE HCL 110MG/55ML
PATIENT CONTROLLED ANALGESIA SYRINGE INTRAVENOUS AS NEEDED
Status: DISCONTINUED | OUTPATIENT
Start: 2019-09-23 | End: 2019-09-23 | Stop reason: SURG

## 2019-09-23 RX ORDER — DEXAMETHASONE SODIUM PHOSPHATE 4 MG/ML
INJECTION, SOLUTION INTRA-ARTICULAR; INTRALESIONAL; INTRAMUSCULAR; INTRAVENOUS; SOFT TISSUE AS NEEDED
Status: DISCONTINUED | OUTPATIENT
Start: 2019-09-23 | End: 2019-09-23 | Stop reason: SURG

## 2019-09-23 RX ORDER — ASPIRIN 81 MG/1
81 TABLET, CHEWABLE ORAL DAILY
Status: DISCONTINUED | OUTPATIENT
Start: 2019-09-24 | End: 2019-09-25 | Stop reason: HOSPADM

## 2019-09-23 RX ORDER — ATORVASTATIN CALCIUM 20 MG/1
20 TABLET, FILM COATED ORAL EVERY EVENING
Status: DISCONTINUED | OUTPATIENT
Start: 2019-09-23 | End: 2019-09-25 | Stop reason: HOSPADM

## 2019-09-23 RX ORDER — BUDESONIDE 0.5 MG/2ML
0.5 INHALANT ORAL
Status: DISCONTINUED | OUTPATIENT
Start: 2019-09-23 | End: 2019-09-25 | Stop reason: HOSPADM

## 2019-09-23 RX ORDER — GLYCOPYRROLATE 0.2 MG/ML
INJECTION INTRAMUSCULAR; INTRAVENOUS AS NEEDED
Status: DISCONTINUED | OUTPATIENT
Start: 2019-09-23 | End: 2019-09-23 | Stop reason: SURG

## 2019-09-23 RX ORDER — HYDROCODONE BITARTRATE AND ACETAMINOPHEN 5; 325 MG/1; MG/1
1 TABLET ORAL EVERY 4 HOURS PRN
Status: DISCONTINUED | OUTPATIENT
Start: 2019-09-23 | End: 2019-09-25 | Stop reason: HOSPADM

## 2019-09-23 RX ORDER — HYDROCODONE BITARTRATE AND ACETAMINOPHEN 5; 325 MG/1; MG/1
1 TABLET ORAL ONCE AS NEEDED
Status: DISCONTINUED | OUTPATIENT
Start: 2019-09-23 | End: 2019-09-23 | Stop reason: HOSPADM

## 2019-09-23 RX ORDER — KETAMINE HYDROCHLORIDE 10 MG/ML
INJECTION INTRAMUSCULAR; INTRAVENOUS AS NEEDED
Status: DISCONTINUED | OUTPATIENT
Start: 2019-09-23 | End: 2019-09-23 | Stop reason: SURG

## 2019-09-23 RX ORDER — PROTAMINE SULFATE 10 MG/ML
INJECTION, SOLUTION INTRAVENOUS AS NEEDED
Status: DISCONTINUED | OUTPATIENT
Start: 2019-09-23 | End: 2019-09-23 | Stop reason: SURG

## 2019-09-23 RX ORDER — FENTANYL CITRATE 50 UG/ML
50 INJECTION, SOLUTION INTRAMUSCULAR; INTRAVENOUS
Status: DISCONTINUED | OUTPATIENT
Start: 2019-09-23 | End: 2019-09-23 | Stop reason: HOSPADM

## 2019-09-23 RX ORDER — FENTANYL CITRATE 50 UG/ML
INJECTION, SOLUTION INTRAMUSCULAR; INTRAVENOUS AS NEEDED
Status: DISCONTINUED | OUTPATIENT
Start: 2019-09-23 | End: 2019-09-23 | Stop reason: SURG

## 2019-09-23 RX ORDER — LIDOCAINE HYDROCHLORIDE 20 MG/ML
INJECTION, SOLUTION EPIDURAL; INFILTRATION; INTRACAUDAL; PERINEURAL AS NEEDED
Status: DISCONTINUED | OUTPATIENT
Start: 2019-09-23 | End: 2019-09-23 | Stop reason: SURG

## 2019-09-23 RX ORDER — CHOLECALCIFEROL (VITAMIN D3) 125 MCG
5 CAPSULE ORAL NIGHTLY PRN
Status: DISCONTINUED | OUTPATIENT
Start: 2019-09-23 | End: 2019-09-25 | Stop reason: HOSPADM

## 2019-09-23 RX ORDER — ONDANSETRON 2 MG/ML
INJECTION INTRAMUSCULAR; INTRAVENOUS AS NEEDED
Status: DISCONTINUED | OUTPATIENT
Start: 2019-09-23 | End: 2019-09-23 | Stop reason: SURG

## 2019-09-23 RX ORDER — PROPOFOL 10 MG/ML
INJECTION, EMULSION INTRAVENOUS AS NEEDED
Status: DISCONTINUED | OUTPATIENT
Start: 2019-09-23 | End: 2019-09-23 | Stop reason: SURG

## 2019-09-23 RX ORDER — IPRATROPIUM BROMIDE AND ALBUTEROL SULFATE 2.5; .5 MG/3ML; MG/3ML
3 SOLUTION RESPIRATORY (INHALATION)
Status: DISCONTINUED | OUTPATIENT
Start: 2019-09-23 | End: 2019-09-24

## 2019-09-23 RX ORDER — ROPINIROLE 0.25 MG/1
0.5 TABLET, FILM COATED ORAL 2 TIMES DAILY
Status: DISCONTINUED | OUTPATIENT
Start: 2019-09-23 | End: 2019-09-25 | Stop reason: HOSPADM

## 2019-09-23 RX ORDER — NEOSTIGMINE METHYLSULFATE 5 MG/5 ML
SYRINGE (ML) INTRAVENOUS AS NEEDED
Status: DISCONTINUED | OUTPATIENT
Start: 2019-09-23 | End: 2019-09-23 | Stop reason: SURG

## 2019-09-23 RX ORDER — SODIUM CHLORIDE 0.9 % (FLUSH) 0.9 %
3-10 SYRINGE (ML) INJECTION AS NEEDED
Status: DISCONTINUED | OUTPATIENT
Start: 2019-09-23 | End: 2019-09-23 | Stop reason: HOSPADM

## 2019-09-23 RX ORDER — NALOXONE HCL 0.4 MG/ML
VIAL (ML) INJECTION AS NEEDED
Status: DISCONTINUED | OUTPATIENT
Start: 2019-09-23 | End: 2019-09-23 | Stop reason: SURG

## 2019-09-23 RX ADMIN — HEPARIN SODIUM 5000 UNITS: 1000 INJECTION, SOLUTION INTRAVENOUS; SUBCUTANEOUS at 08:55

## 2019-09-23 RX ADMIN — KETAMINE HYDROCHLORIDE 15 MG: 10 INJECTION INTRAMUSCULAR; INTRAVENOUS at 07:40

## 2019-09-23 RX ADMIN — SODIUM CHLORIDE, SODIUM LACTATE, POTASSIUM CHLORIDE, AND CALCIUM CHLORIDE 75 ML/HR: 600; 310; 30; 20 INJECTION, SOLUTION INTRAVENOUS at 21:10

## 2019-09-23 RX ADMIN — SODIUM CHLORIDE 9 ML/HR: 900 INJECTION, SOLUTION INTRAVENOUS at 06:56

## 2019-09-23 RX ADMIN — ROCURONIUM BROMIDE 50 MG: 10 INJECTION, SOLUTION INTRAVENOUS at 07:34

## 2019-09-23 RX ADMIN — GLYCOPYRROLATE 0.6 MG: 0.2 INJECTION, SOLUTION INTRAMUSCULAR; INTRAVENOUS at 09:47

## 2019-09-23 RX ADMIN — PROTAMINE SULFATE 50 MG: 10 INJECTION, SOLUTION INTRAVENOUS at 09:58

## 2019-09-23 RX ADMIN — HYDRALAZINE HYDROCHLORIDE 5 MG: 20 INJECTION INTRAMUSCULAR; INTRAVENOUS at 11:27

## 2019-09-23 RX ADMIN — SODIUM CHLORIDE: 900 INJECTION, SOLUTION INTRAVENOUS at 07:45

## 2019-09-23 RX ADMIN — KETAMINE HYDROCHLORIDE 10 MG: 10 INJECTION INTRAMUSCULAR; INTRAVENOUS at 07:34

## 2019-09-23 RX ADMIN — HYDRALAZINE HYDROCHLORIDE 5 MG: 20 INJECTION INTRAMUSCULAR; INTRAVENOUS at 11:02

## 2019-09-23 RX ADMIN — LIDOCAINE HYDROCHLORIDE 100 MG: 20 INJECTION, SOLUTION EPIDURAL; INFILTRATION; INTRACAUDAL; PERINEURAL at 07:34

## 2019-09-23 RX ADMIN — SODIUM CHLORIDE, SODIUM LACTATE, POTASSIUM CHLORIDE, AND CALCIUM CHLORIDE 75 ML/HR: 600; 310; 30; 20 INJECTION, SOLUTION INTRAVENOUS at 16:18

## 2019-09-23 RX ADMIN — Medication 4 MG: at 09:47

## 2019-09-23 RX ADMIN — ATORVASTATIN CALCIUM 20 MG: 20 TABLET, FILM COATED ORAL at 16:16

## 2019-09-23 RX ADMIN — DEXAMETHASONE SODIUM PHOSPHATE 4 MG: 4 INJECTION, SOLUTION INTRAMUSCULAR; INTRAVENOUS at 07:45

## 2019-09-23 RX ADMIN — MELATONIN TAB 5 MG 5 MG: 5 TAB at 21:00

## 2019-09-23 RX ADMIN — BUDESONIDE 0.5 MG: 0.5 INHALANT RESPIRATORY (INHALATION) at 19:05

## 2019-09-23 RX ADMIN — HYDROMORPHONE HYDROCHLORIDE 0.5 MG: 2 INJECTION INTRAMUSCULAR; INTRAVENOUS; SUBCUTANEOUS at 08:13

## 2019-09-23 RX ADMIN — PROPOFOL 100 MG: 10 INJECTION, EMULSION INTRAVENOUS at 07:34

## 2019-09-23 RX ADMIN — ROCURONIUM BROMIDE 20 MG: 10 INJECTION, SOLUTION INTRAVENOUS at 09:21

## 2019-09-23 RX ADMIN — FENTANYL CITRATE 50 MCG: 50 INJECTION INTRAMUSCULAR; INTRAVENOUS at 11:33

## 2019-09-23 RX ADMIN — DILTIAZEM HYDROCHLORIDE 15 MG: 30 TABLET, FILM COATED ORAL at 18:21

## 2019-09-23 RX ADMIN — ROCURONIUM BROMIDE 20 MG: 10 INJECTION, SOLUTION INTRAVENOUS at 08:28

## 2019-09-23 RX ADMIN — ROPINIROLE 0.5 MG: 0.25 TABLET, FILM COATED ORAL at 21:00

## 2019-09-23 RX ADMIN — IPRATROPIUM BROMIDE AND ALBUTEROL SULFATE 3 ML: .5; 3 SOLUTION RESPIRATORY (INHALATION) at 19:04

## 2019-09-23 RX ADMIN — ONDANSETRON 4 MG: 2 INJECTION INTRAMUSCULAR; INTRAVENOUS at 09:52

## 2019-09-23 RX ADMIN — HEPARIN SODIUM 10000 UNITS: 1000 INJECTION, SOLUTION INTRAVENOUS; SUBCUTANEOUS at 08:41

## 2019-09-23 RX ADMIN — CEFAZOLIN SODIUM 2 G: 10 INJECTION, POWDER, FOR SOLUTION INTRAVENOUS at 16:16

## 2019-09-23 RX ADMIN — ALPRAZOLAM 0.5 MG: 0.5 TABLET ORAL at 21:00

## 2019-09-23 RX ADMIN — NALOXONE HYDROCHLORIDE 0.4 MG: 0.4 INJECTION, SOLUTION INTRAMUSCULAR; INTRAVENOUS; SUBCUTANEOUS at 10:00

## 2019-09-23 RX ADMIN — FENTANYL CITRATE 50 MCG: 50 INJECTION, SOLUTION INTRAMUSCULAR; INTRAVENOUS at 07:34

## 2019-09-23 RX ADMIN — SODIUM CHLORIDE: 0.9 INJECTION, SOLUTION INTRAVENOUS at 07:30

## 2019-09-23 RX ADMIN — DILTIAZEM HYDROCHLORIDE 15 MG: 30 TABLET, FILM COATED ORAL at 21:00

## 2019-09-23 NOTE — ANESTHESIA PROCEDURE NOTES
Airway  Date/Time: 9/23/2019 7:37 AM  Airway not difficult    General Information and Staff    Patient location during procedure: OR    Indications and Patient Condition  Indications for airway management: airway protection    Preoxygenated: yes  MILS maintained throughout  Mask difficulty assessment: 1 - vent by mask    Final Airway Details  Final airway type: endotracheal airway      Successful airway: ETT  Cuffed: yes   Successful intubation technique: direct laryngoscopy  Facilitating devices/methods: intubating stylet  Endotracheal tube insertion site: oral  Blade: Stearns  Blade size: 2  ETT size (mm): 7.5  Cormack-Lehane Classification: grade IIa - partial view of glottis  Placement verified by: chest auscultation and capnometry   Cuff volume (mL): 10  Measured from: gums  ETT/EBT to gums (cm): 23  Number of attempts at approach: 1  Assessment: lips, teeth, and gum same as pre-op

## 2019-09-23 NOTE — ANESTHESIA PREPROCEDURE EVALUATION
Anesthesia Evaluation     Patient summary reviewed   NPO Solid Status: > 8 hours  NPO Liquid Status: > 8 hours           Airway   Mallampati: II  TM distance: >3 FB  Neck ROM: full  No difficulty expected  Dental - normal exam     Pulmonary - normal exam   (+) COPD, sleep apnea,   Cardiovascular - normal exam  Exercise tolerance: poor (<4 METS)    ECG reviewed    (+) dysrhythmias Atrial Fib, CHF, PVD,       Neuro/Psych  GI/Hepatic/Renal/Endo    (+)   liver disease, diabetes mellitus type 2,     Musculoskeletal     Abdominal  - normal exam    Bowel sounds: normal.   Substance History   (+) alcohol use,      OB/GYN          Other                        Anesthesia Plan    ASA 4     general     intravenous induction   Anesthetic plan, all risks, benefits, and alternatives have been provided, discussed and informed consent has been obtained with: patient.  Use of blood products discussed with patient .

## 2019-09-23 NOTE — ANESTHESIA PROCEDURE NOTES
Arterial Line    Pre-sedation assessment completed: 9/23/2019 7:35 AM    Patient location during procedure: OR  Start time: 9/23/2019 7:35 AM  Stop Time:9/23/2019 7:40 AM       Line placed for hemodynamic monitoring and ABGs/Labs/ISTAT.  Performed By   CRNA: Handy Galvan Jr., CRNA  Preanesthetic Checklist  Completed: patient identified, site marked, surgical consent, pre-op evaluation, timeout performed, IV checked, risks and benefits discussed and monitors and equipment checked  Arterial Line Prep   Sterile Tech: cap, gloves and mask  Prep: ChloraPrep  Patient monitoring: blood pressure monitoring, continuous pulse oximetry and EKG  Arterial Line Procedure   Laterality:left  Location:  radial artery  Catheter size: 20 G   Guidance: landmark technique and palpation technique  Number of attempts: 1  Successful placement: yes

## 2019-09-23 NOTE — ANESTHESIA POSTPROCEDURE EVALUATION
Patient: Giles Frost    Procedure Summary     Date:  09/23/19 Room / Location:  [facility] OR [address] [place] [address] MAIN OR    Anesthesia Start:  0729 Anesthesia Stop:  1020    Procedure:  ENDOVASCULAR AORTIC ANEURYSM REPAIR WITH GORE (EVAR) (N/A Abdomen) Diagnosis:  (AAA)    Surgeon:  Dewayne Victoria MD Provider:  Jhon Jenkins MD    Anesthesia Type:  general ASA Status:  4          Anesthesia Type: general  Last vitals  BP   124/65 (09/23/19 1145)   Temp   97.9 °F (36.6 °C) (09/23/19 1145)   Pulse   87 (09/23/19 1145)   Resp   24 (09/23/19 1145)     SpO2   97 % (09/23/19 1145)     Post Anesthesia Care and Evaluation    Patient location during evaluation: PACU  Patient participation: complete - patient participated  Level of consciousness: awake  Pain scale: See nurse's notes for pain score.  Pain management: adequate  Airway patency: patent  Anesthetic complications: No anesthetic complications  PONV Status: none  Cardiovascular status: acceptable  Respiratory status: acceptable  Hydration status: acceptable    Comments: Patient seen and examined postoperatively; vital signs stable; SpO2 greater than or equal to 90%; cardiopulmonary status stable; nausea/vomiting adequately controlled; pain adequately controlled; no apparent anesthesia complications; patient discharged from anesthesia care when discharge criteria were met

## 2019-09-24 ENCOUNTER — APPOINTMENT (OUTPATIENT)
Dept: GENERAL RADIOLOGY | Facility: HOSPITAL | Age: 79
End: 2019-09-24

## 2019-09-24 VITALS
SYSTOLIC BLOOD PRESSURE: 111 MMHG | OXYGEN SATURATION: 96 % | BODY MASS INDEX: 22 KG/M2 | DIASTOLIC BLOOD PRESSURE: 79 MMHG | RESPIRATION RATE: 16 BRPM | WEIGHT: 153.66 LBS | TEMPERATURE: 97.9 F | HEIGHT: 70 IN | HEART RATE: 93 BPM

## 2019-09-24 LAB
ALBUMIN SERPL-MCNC: 3.1 G/DL (ref 3.5–4.8)
ALBUMIN/GLOB SERPL: 1 G/DL (ref 1–1.7)
ALP SERPL-CCNC: 58 U/L (ref 32–91)
ALT SERPL W P-5'-P-CCNC: 17 U/L (ref 17–63)
ANION GAP SERPL CALCULATED.3IONS-SCNC: 13.3 MMOL/L (ref 5–15)
APTT PPP: 26.4 SECONDS (ref 24–31)
AST SERPL-CCNC: 23 U/L (ref 15–41)
BASOPHILS # BLD AUTO: 0 10*3/MM3 (ref 0–0.2)
BASOPHILS NFR BLD AUTO: 0.4 % (ref 0–1.5)
BILIRUB CONJ SERPL-MCNC: 0.2 MG/DL (ref 0.1–0.5)
BILIRUB SERPL-MCNC: 0.9 MG/DL (ref 0.3–1.2)
BUN BLD-MCNC: 11 MG/DL (ref 8–20)
BUN/CREAT SERPL: 12.2 (ref 6.2–20.3)
CALCIUM SPEC-SCNC: 8.4 MG/DL (ref 8.9–10.3)
CHLORIDE SERPL-SCNC: 98 MMOL/L (ref 101–111)
CO2 SERPL-SCNC: 29 MMOL/L (ref 22–32)
CREAT BLD-MCNC: 0.9 MG/DL (ref 0.7–1.2)
DEPRECATED RDW RBC AUTO: 45.9 FL (ref 37–54)
EOSINOPHIL # BLD AUTO: 0 10*3/MM3 (ref 0–0.4)
EOSINOPHIL NFR BLD AUTO: 0.1 % (ref 0.3–6.2)
ERYTHROCYTE [DISTWIDTH] IN BLOOD BY AUTOMATED COUNT: 13.4 % (ref 12.3–15.4)
GFR SERPL CREATININE-BSD FRML MDRD: 82 ML/MIN/1.73
GLOBULIN UR ELPH-MCNC: 3.2 GM/DL (ref 2.5–3.8)
GLUCOSE BLD-MCNC: 127 MG/DL (ref 65–99)
HCT VFR BLD AUTO: 35.4 % (ref 37.5–51)
HGB BLD-MCNC: 11.8 G/DL (ref 13–17.7)
INR PPP: 1.31 (ref 0.9–1.1)
LYMPHOCYTES # BLD AUTO: 1 10*3/MM3 (ref 0.7–3.1)
LYMPHOCYTES NFR BLD AUTO: 11.2 % (ref 19.6–45.3)
MCH RBC QN AUTO: 32.3 PG (ref 26.6–33)
MCHC RBC AUTO-ENTMCNC: 33.5 G/DL (ref 31.5–35.7)
MCV RBC AUTO: 96.5 FL (ref 79–97)
MONOCYTES # BLD AUTO: 0.6 10*3/MM3 (ref 0.1–0.9)
MONOCYTES NFR BLD AUTO: 6.5 % (ref 5–12)
NEUTROPHILS # BLD AUTO: 7.4 10*3/MM3 (ref 1.7–7)
NEUTROPHILS NFR BLD AUTO: 81.8 % (ref 42.7–76)
NRBC BLD AUTO-RTO: 0.2 /100 WBC (ref 0–0.2)
PLATELET # BLD AUTO: 203 10*3/MM3 (ref 140–450)
PMV BLD AUTO: 7.1 FL (ref 6–12)
POTASSIUM BLD-SCNC: 4.3 MMOL/L (ref 3.6–5.1)
PROT SERPL-MCNC: 6.3 G/DL (ref 6.1–7.9)
PROTHROMBIN TIME: 13.1 SECONDS (ref 9.6–11.7)
RBC # BLD AUTO: 3.67 10*6/MM3 (ref 4.14–5.8)
SODIUM BLD-SCNC: 136 MMOL/L (ref 136–144)
WBC NRBC COR # BLD: 9 10*3/MM3 (ref 3.4–10.8)

## 2019-09-24 PROCEDURE — 94799 UNLISTED PULMONARY SVC/PX: CPT

## 2019-09-24 PROCEDURE — 85610 PROTHROMBIN TIME: CPT | Performed by: INTERNAL MEDICINE

## 2019-09-24 PROCEDURE — 97162 PT EVAL MOD COMPLEX 30 MIN: CPT

## 2019-09-24 PROCEDURE — 80053 COMPREHEN METABOLIC PANEL: CPT | Performed by: INTERNAL MEDICINE

## 2019-09-24 PROCEDURE — 85025 COMPLETE CBC W/AUTO DIFF WBC: CPT | Performed by: SURGERY

## 2019-09-24 PROCEDURE — 71045 X-RAY EXAM CHEST 1 VIEW: CPT

## 2019-09-24 PROCEDURE — 97116 GAIT TRAINING THERAPY: CPT

## 2019-09-24 PROCEDURE — 82248 BILIRUBIN DIRECT: CPT | Performed by: SURGERY

## 2019-09-24 PROCEDURE — 85730 THROMBOPLASTIN TIME PARTIAL: CPT | Performed by: INTERNAL MEDICINE

## 2019-09-24 PROCEDURE — 82962 GLUCOSE BLOOD TEST: CPT

## 2019-09-24 PROCEDURE — 25010000002 ENOXAPARIN PER 10 MG: Performed by: SURGERY

## 2019-09-24 PROCEDURE — 25010000002 CEFAZOLIN PER 500 MG: Performed by: SURGERY

## 2019-09-24 RX ORDER — TAMSULOSIN HYDROCHLORIDE 0.4 MG/1
0.4 CAPSULE ORAL NIGHTLY
Qty: 30 CAPSULE | Refills: 0 | Status: SHIPPED | OUTPATIENT
Start: 2019-09-25 | End: 2020-07-03

## 2019-09-24 RX ORDER — TAMSULOSIN HYDROCHLORIDE 0.4 MG/1
0.4 CAPSULE ORAL NIGHTLY
Status: DISCONTINUED | OUTPATIENT
Start: 2019-09-24 | End: 2019-09-25 | Stop reason: HOSPADM

## 2019-09-24 RX ORDER — TAMSULOSIN HYDROCHLORIDE 0.4 MG/1
1 CAPSULE ORAL DAILY
Qty: 30 CAPSULE | Refills: 0 | Status: SHIPPED | OUTPATIENT
Start: 2019-09-24 | End: 2020-06-26

## 2019-09-24 RX ADMIN — ENOXAPARIN SODIUM 40 MG: 40 INJECTION SUBCUTANEOUS at 19:51

## 2019-09-24 RX ADMIN — BUDESONIDE 0.5 MG: 0.5 INHALANT RESPIRATORY (INHALATION) at 07:40

## 2019-09-24 RX ADMIN — ATORVASTATIN CALCIUM 20 MG: 20 TABLET, FILM COATED ORAL at 19:50

## 2019-09-24 RX ADMIN — IPRATROPIUM BROMIDE AND ALBUTEROL SULFATE 3 ML: .5; 3 SOLUTION RESPIRATORY (INHALATION) at 07:40

## 2019-09-24 RX ADMIN — DILTIAZEM HYDROCHLORIDE 15 MG: 30 TABLET, FILM COATED ORAL at 13:56

## 2019-09-24 RX ADMIN — ROSUVASTATIN CALCIUM 125 MCG: 10 TABLET, FILM COATED ORAL at 08:33

## 2019-09-24 RX ADMIN — IPRATROPIUM BROMIDE AND ALBUTEROL SULFATE 3 ML: .5; 3 SOLUTION RESPIRATORY (INHALATION) at 14:22

## 2019-09-24 RX ADMIN — TAMSULOSIN HYDROCHLORIDE 0.4 MG: 0.4 CAPSULE ORAL at 19:46

## 2019-09-24 RX ADMIN — DILTIAZEM HYDROCHLORIDE 15 MG: 30 TABLET, FILM COATED ORAL at 19:44

## 2019-09-24 RX ADMIN — ROPINIROLE 0.5 MG: 0.25 TABLET, FILM COATED ORAL at 08:39

## 2019-09-24 RX ADMIN — ASPIRIN 81 MG 81 MG: 81 TABLET ORAL at 08:33

## 2019-09-24 RX ADMIN — CEFAZOLIN SODIUM 2 G: 10 INJECTION, POWDER, FOR SOLUTION INTRAVENOUS at 00:15

## 2019-09-24 RX ADMIN — IPRATROPIUM BROMIDE AND ALBUTEROL SULFATE 3 ML: .5; 3 SOLUTION RESPIRATORY (INHALATION) at 00:36

## 2019-09-24 RX ADMIN — IPRATROPIUM BROMIDE AND ALBUTEROL SULFATE 3 ML: .5; 3 SOLUTION RESPIRATORY (INHALATION) at 12:13

## 2019-09-24 RX ADMIN — FUROSEMIDE 60 MG: 40 TABLET ORAL at 08:34

## 2019-09-24 RX ADMIN — DILTIAZEM HYDROCHLORIDE 15 MG: 30 TABLET, FILM COATED ORAL at 08:34

## 2019-09-25 ENCOUNTER — READMISSION MANAGEMENT (OUTPATIENT)
Dept: CALL CENTER | Facility: HOSPITAL | Age: 79
End: 2019-09-25

## 2019-09-25 LAB
GLUCOSE BLDC GLUCOMTR-MCNC: 105 MG/DL (ref 70–105)
GLUCOSE BLDC GLUCOMTR-MCNC: 116 MG/DL (ref 70–105)
GLUCOSE BLDC GLUCOMTR-MCNC: 117 MG/DL (ref 70–105)

## 2019-09-25 NOTE — OUTREACH NOTE
Prep Survey      Responses   Facility patient discharged from?  [facility]   Is patient eligible?  Yes   Discharge diagnosis  AAA without rupture, PAF, COPD   Does the patient have one of the following disease processes/diagnoses(primary or secondary)?  General Surgery   Does the patient have Home health ordered?  No   Is there a DME ordered?  No   Comments regarding appointments  [facility] in 2 weeks   Medication alerts for this patient  Flomax   Prep survey completed?  Yes          Viola Molina LPN

## 2019-09-26 ENCOUNTER — READMISSION MANAGEMENT (OUTPATIENT)
Dept: CALL CENTER | Facility: HOSPITAL | Age: 79
End: 2019-09-26

## 2019-09-26 NOTE — OUTREACH NOTE
General Surgery Week 1 Survey      Responses   Facility patient discharged from?  [facility]   Does the patient have one of the following disease processes/diagnoses(primary or secondary)?  General Surgery   Is there a successful TCM telephone encounter documented?  No   Week 1 attempt successful?  Yes   Call start time  1033   Call end time  1043   Discharge diagnosis  AAA without rupture, PAF, COPD   Medication alerts for this patient  Flomax   Meds reviewed with patient/caregiver?  Yes   Is the patient having any side effects they believe may be caused by any medication additions or changes?  No   Does the patient have all medications related to this admission filled (includes all antibiotics, pain medications, etc.)  Yes   Is the patient taking all medications as directed (includes completed medication regime)?  Yes   Does the patient have a follow up appointment scheduled with their surgeon?  Yes   Has the patient kept scheduled appointments due by today?  N/A   Has home health visited the patient within 72 hours of discharge?  N/A   Psychosocial issues?  No   Did the patient receive a copy of their discharge instructions?  Yes   Nursing interventions  Reviewed instructions with patient   What is the patient's perception of their health status since discharge?  Improving   Nursing interventions  Nurse provided patient education   Is the patient /caregiver able to teach back basic post-op care?  Continue use of incentive spirometry at least 1 week post discharge, Practice 'cough and deep breath', Lifting as instructed by MD in discharge instructions, Keep incision areas clean,dry and protected, Take showers only when approved by MD-sponge bathe until then   Is the patient/caregiver able to teach back signs and symptoms of incisional infection?  Increased redness, swelling or pain at the incisonal site, Increased drainage or bleeding, Incisional warmth, Pus or odor from incision, Fever   Is the patient/caregiver able  to teach back steps to recovery at home?  Set small, achievable goals for return to baseline health, Rest and rebuild strength, gradually increase activity, Eat a well-balance diet, Make a list of questions for surgeon's appointment   Is the patient/caregiver able to teach back the hierarchy of who to call/visit for symptoms/problems? PCP, Specialist, Home health nurse, Urgent Care, ED, 911  Yes   Additional teach back comments  Encouraged good protein, he says he has no pain at this time, no fever, swelling or other issues.   Week 1 call completed?  Yes          Malia Vences RN

## 2019-09-27 LAB — GLUCOSE BLDC GLUCOMTR-MCNC: 118 MG/DL (ref 70–105)

## 2019-09-30 ENCOUNTER — LAB (OUTPATIENT)
Dept: LAB | Facility: HOSPITAL | Age: 79
End: 2019-09-30

## 2019-09-30 ENCOUNTER — TRANSCRIBE ORDERS (OUTPATIENT)
Dept: ADMINISTRATIVE | Facility: HOSPITAL | Age: 79
End: 2019-09-30

## 2019-09-30 DIAGNOSIS — Z79.01 LONG TERM (CURRENT) USE OF ANTICOAGULANTS: Primary | ICD-10-CM

## 2019-09-30 DIAGNOSIS — Z79.01 LONG TERM (CURRENT) USE OF ANTICOAGULANTS: ICD-10-CM

## 2019-09-30 LAB
INR PPP: 1.79 (ref 2–3)
PROTHROMBIN TIME: 17.3 SECONDS (ref 19.4–28.5)

## 2019-09-30 PROCEDURE — 36415 COLL VENOUS BLD VENIPUNCTURE: CPT

## 2019-09-30 PROCEDURE — 85610 PROTHROMBIN TIME: CPT

## 2019-10-03 ENCOUNTER — READMISSION MANAGEMENT (OUTPATIENT)
Dept: CALL CENTER | Facility: HOSPITAL | Age: 79
End: 2019-10-03

## 2019-10-03 NOTE — OUTREACH NOTE
General Surgery Week 2 Survey      Responses   Facility patient discharged from?  [facility]   Does the patient have one of the following disease processes/diagnoses(primary or secondary)?  General Surgery   Week 2 attempt successful?  Yes   Call start time  0934   Call end time  0941   Discharge diagnosis  AAA without rupture, PAF, COPD   Meds reviewed with patient/caregiver?  Yes   Is the patient having any side effects they believe may be caused by any medication additions or changes?  No   Does the patient have all medications related to this admission filled (includes all antibiotics, pain medications, etc.)  Yes   Is the patient taking all medications as directed (includes completed medication regime)?  Yes   Does the patient have a follow up appointment scheduled with their surgeon?  Yes   Has the patient kept scheduled appointments due by today?  N/A   Comments  Has appt with Vascular on Oct 10   Has home health visited the patient within 72 hours of discharge?  N/A   Did the patient receive a copy of their discharge instructions?  Yes   Nursing interventions  Reviewed instructions with patient   What is the patient's perception of their health status since discharge?  Improving   Nursing interventions  Nurse provided patient education   Is the patient /caregiver able to teach back basic post-op care?  Practice 'cough and deep breath', Drive as instructed by MD in discharge instructions, Take showers only when approved by MD-sponge bathe until then, No tub bath, swimming, or hot tub until instructed by MD, Keep incision areas clean,dry and protected, Do not remove steri-strips, Lifting as instructed by MD in discharge instructions   Is the patient/caregiver able to teach back signs and symptoms of incisional infection?  Increased redness, swelling or pain at the incisonal site, Increased drainage or bleeding, Incisional warmth, Pus or odor from incision, Fever   Is the patient/caregiver able to teach back steps  to recovery at home?  Set small, achievable goals for return to baseline health, Rest and rebuild strength, gradually increase activity, Weigh daily, Practice good oral hygiene, Eat a well-balance diet, Make a list of questions for surgeon's appointment   Is the patient/caregiver able to teach back the hierarchy of who to call/visit for symptoms/problems? PCP, Specialist, Home health nurse, Urgent Care, ED, 911  Yes   Additional teach back comments  Patient states he is having trouble gaining weight.  States he has been eating well but hasn't gain weight.  Has been drinking boost but it has given him diarrhea.   Week 2 call completed?  Yes   Wrap up additional comments  Patient has questions regarding his CT scan and the size of another anuerysm by his heart.  Advised to discuss this with the Vascular doctor at his appt and he will have the results.          Viola Molina LPN

## 2019-10-10 ENCOUNTER — APPOINTMENT (OUTPATIENT)
Dept: VASCULAR SURGERY | Facility: HOSPITAL | Age: 79
End: 2019-10-10

## 2019-10-10 PROCEDURE — G0463 HOSPITAL OUTPT CLINIC VISIT: HCPCS

## 2019-10-11 ENCOUNTER — TRANSCRIBE ORDERS (OUTPATIENT)
Dept: ADMINISTRATIVE | Facility: HOSPITAL | Age: 79
End: 2019-10-11

## 2019-10-11 DIAGNOSIS — I73.9 PERIPHERAL VASCULAR DISEASE, UNSPECIFIED (HCC): Primary | ICD-10-CM

## 2019-10-11 DIAGNOSIS — Z95.828 ACQUIRED PORTAL-SYSTEMIC SHUNT: Primary | ICD-10-CM

## 2019-10-22 ENCOUNTER — LAB (OUTPATIENT)
Dept: LAB | Facility: HOSPITAL | Age: 79
End: 2019-10-22

## 2019-10-22 ENCOUNTER — TRANSCRIBE ORDERS (OUTPATIENT)
Dept: ADMINISTRATIVE | Facility: HOSPITAL | Age: 79
End: 2019-10-22

## 2019-10-22 DIAGNOSIS — Z95.828 HISTORY OF EXTREMITY BYPASS GRAFT: Primary | ICD-10-CM

## 2019-10-22 DIAGNOSIS — Z95.828 HISTORY OF EXTREMITY BYPASS GRAFT: ICD-10-CM

## 2019-10-22 LAB
CREAT BLD-MCNC: 0.96 MG/DL (ref 0.76–1.27)
GFR SERPL CREATININE-BSD FRML MDRD: 76 ML/MIN/1.73

## 2019-10-22 PROCEDURE — 82565 ASSAY OF CREATININE: CPT

## 2019-10-22 PROCEDURE — 36415 COLL VENOUS BLD VENIPUNCTURE: CPT

## 2019-10-24 ENCOUNTER — APPOINTMENT (OUTPATIENT)
Dept: CT IMAGING | Facility: HOSPITAL | Age: 79
End: 2019-10-24

## 2019-10-29 ENCOUNTER — HOSPITAL ENCOUNTER (OUTPATIENT)
Dept: CT IMAGING | Facility: HOSPITAL | Age: 79
Discharge: HOME | End: 2019-10-29
Admitting: SURGERY

## 2019-10-29 DIAGNOSIS — Z95.828 ACQUIRED PORTAL-SYSTEMIC SHUNT: ICD-10-CM

## 2019-10-29 PROCEDURE — 0 IOPAMIDOL PER 1 ML: Performed by: SURGERY

## 2019-10-29 PROCEDURE — 74174 CTA ABD&PLVS W/CONTRAST: CPT

## 2019-10-29 RX ADMIN — IOPAMIDOL 100 ML: 755 INJECTION, SOLUTION INTRAVENOUS at 09:45

## 2019-11-07 ENCOUNTER — APPOINTMENT (OUTPATIENT)
Dept: VASCULAR SURGERY | Facility: HOSPITAL | Age: 79
End: 2019-11-07

## 2019-11-07 ENCOUNTER — TRANSCRIBE ORDERS (OUTPATIENT)
Dept: ADMINISTRATIVE | Facility: HOSPITAL | Age: 79
End: 2019-11-07

## 2019-11-07 ENCOUNTER — HOSPITAL ENCOUNTER (OUTPATIENT)
Dept: GENERAL RADIOLOGY | Facility: HOSPITAL | Age: 79
Discharge: HOME | End: 2019-11-07
Admitting: SURGERY

## 2019-11-07 DIAGNOSIS — Z95.828 ACQUIRED PORTAL-SYSTEMIC SHUNT: Primary | ICD-10-CM

## 2019-11-07 DIAGNOSIS — Z95.828 ACQUIRED PORTAL-SYSTEMIC SHUNT: ICD-10-CM

## 2019-11-07 PROCEDURE — G0463 HOSPITAL OUTPT CLINIC VISIT: HCPCS

## 2019-11-07 PROCEDURE — 74021 RADEX ABDOMEN 3+ VIEWS: CPT

## 2019-11-07 PROCEDURE — 74018 RADEX ABDOMEN 1 VIEW: CPT

## 2019-11-13 ENCOUNTER — HOSPITAL ENCOUNTER (OUTPATIENT)
Dept: CT IMAGING | Facility: HOSPITAL | Age: 79
Discharge: HOME | End: 2019-11-13
Admitting: THORACIC SURGERY (CARDIOTHORACIC VASCULAR SURGERY)

## 2019-11-13 DIAGNOSIS — I71.21 ASCENDING AORTIC ANEURYSM (HCC): ICD-10-CM

## 2019-11-13 PROCEDURE — 71250 CT THORAX DX C-: CPT

## 2019-11-18 ENCOUNTER — TRANSCRIBE ORDERS (OUTPATIENT)
Dept: ADMINISTRATIVE | Facility: HOSPITAL | Age: 79
End: 2019-11-18

## 2019-11-18 DIAGNOSIS — I71.40 ABDOMINAL AORTIC ANEURYSM WITHOUT RUPTURE (HCC): Primary | ICD-10-CM

## 2019-11-21 ENCOUNTER — OFFICE VISIT (OUTPATIENT)
Dept: CARDIAC SURGERY | Facility: CLINIC | Age: 79
End: 2019-11-21

## 2019-11-21 VITALS
SYSTOLIC BLOOD PRESSURE: 130 MMHG | TEMPERATURE: 97.4 F | OXYGEN SATURATION: 93 % | HEART RATE: 78 BPM | WEIGHT: 161 LBS | HEIGHT: 70 IN | RESPIRATION RATE: 20 BRPM | BODY MASS INDEX: 23.05 KG/M2 | DIASTOLIC BLOOD PRESSURE: 85 MMHG

## 2019-11-21 DIAGNOSIS — I36.1 NON-RHEUMATIC TRICUSPID VALVE INSUFFICIENCY: ICD-10-CM

## 2019-11-21 DIAGNOSIS — J44.9 COPD MIXED TYPE (HCC): ICD-10-CM

## 2019-11-21 DIAGNOSIS — I71.40 AAA (ABDOMINAL AORTIC ANEURYSM) WITHOUT RUPTURE (HCC): ICD-10-CM

## 2019-11-21 DIAGNOSIS — I71.40 ABDOMINAL AORTIC ANEURYSM (AAA) WITHOUT RUPTURE (HCC): ICD-10-CM

## 2019-11-21 DIAGNOSIS — R91.1 INCIDENTAL LUNG NODULE, > 3MM AND < 8MM: ICD-10-CM

## 2019-11-21 DIAGNOSIS — I71.21 ASCENDING AORTIC ANEURYSM (HCC): Primary | ICD-10-CM

## 2019-11-21 DIAGNOSIS — I48.0 PAF (PAROXYSMAL ATRIAL FIBRILLATION) (HCC): ICD-10-CM

## 2019-11-21 DIAGNOSIS — K70.30 ALCOHOLIC CIRRHOSIS OF LIVER WITHOUT ASCITES (HCC): ICD-10-CM

## 2019-11-21 PROCEDURE — 99214 OFFICE O/P EST MOD 30 MIN: CPT | Performed by: THORACIC SURGERY (CARDIOTHORACIC VASCULAR SURGERY)

## 2019-11-23 NOTE — PROGRESS NOTES
11/22/2019    Seen on 11/21/19    Chief Complaint:     Follow-up evaluation of thoracic aortic aneurysm    History of Present Illness:       Dear Dr. Lewis and Colleagues,  It was nice to see Giles Frost in follow up evaluation for his thoracic aortic aneurysm. He is a 79 y.o. male with a history of multiple medical problems including alcohol abuse with cirrhosis, anxiety, AAA status post endograft 10, paroxysmal atrial fibrillation, COPD and lung nodule who I saw a few months ago for both the ascending and abdominal aortic aneurysms.  I referred him to  who was able to repair with an endograft his infrarenal AAA. Jluien. It had a saccular component.  Last time he was status post recent discharge he was extremely anxious and agitated.  He went to have surgery right away.  I further you need to have the abdominal part address and then follow-up on his thoracic part. He denies chest pain or abdominal pain or upper back pain,. His last chest CT showed a 5 cm ascending aortic aneurysm without dissection or ulceration.  He has a brother that had a ruptured AAA and a nephew who had a thoracic aneurysm that required treatment in [place].  He has no symptomatic from the aneurysm.  The level of his liver dysfunction or alcohol use is unknown.  He is here for follow-up    Patient Active Problem List   Diagnosis   • Ascending aortic aneurysm (CMS/HCC)   • Incidental lung nodule, > 3mm and < 8mm   • PAF (paroxysmal atrial fibrillation) (CMS/HCC)   • COPD mixed type (CMS/HCC)   • Abdominal aortic aneurysm (AAA) without rupture (CMS/HCC)   • Non-rheumatic tricuspid valve insufficiency   • Acute abdominal pain   • Alcoholic cirrhosis of liver without ascites (CMS/HCC)   • AAA (abdominal aortic aneurysm) without rupture (CMS/HCC)       Past Medical History:   Diagnosis Date   • AAA (abdominal aortic aneurysm) (CMS/HCC)    • Alcohol abuse     Hx   • Alcoholic cirrhosis (CMS/HCC)    • Ankle edema, bilateral     at times    • Anxiety     Welbutrin   • Anxiety    • Aortic aneurysm (CMS/HCC) 09/19/2018    Ascending Measuring 4.7CM Noted on CT Chest   • Atrial fibrillation (CMS/HCC)    • Cardiomegaly 09/19/2018    CT Chest   • Centrilobular emphysema (CMS/HCC) 09/19/2018    On CT Chest   • CHF (congestive heart failure) (CMS/HCC)    • Cholecystolithiasis 09/19/2018    On CT Chest   • Chronic liver disease    • COPD (chronic obstructive pulmonary disease) (CMS/HCC)    • Depression    • Descending thoracic aortic aneurysm (CMS/HCC) 09/19/2018    On Chest CT-4.2CM Near Diaphragmatic Hiatus and 4.8CM at Main Pulmonary Artery   • Diabetes mellitus (CMS/HCC)    • Diarrhea    • History of echocardiogram 12/20/16 & 8/2/17- [facility]   • History of EKG 12/2016    Atrial Fib   • Hx of chest x-ray 12/18/2016    1 V   • Hx of chest x-ray 08/2011   • Hx of CT scan of chest 09/19/18-[facility]    Enlarged Heart Size; Coronary Artery and Aortic Atherosclerotic Calcifications; Ascending Aorta Measuring 4.7CM;    • Long term current use of anticoagulant    • On home oxygen therapy     2 LPM QHS   • Paraseptal emphysema (CMS/HCC) 09/19/2018    On CT Chest   • Pleural nodule 09/19/2018    On CT Chest-6MM   • Pneumonia 06/2019    Hx   • Pulmonary hypertension (CMS/HCC)    • Pulmonary nodules 09/19/2018    R on CT Chest   • Respiratory failure (CMS/HCC) Hypoxic   • Sleep apnea     uses only O2 QHS       Past Surgical History:   Procedure Laterality Date   • ABDOMINAL AORTIC ANEURYSM REPAIR WITH ENDOGRAFT N/A 9/23/2019    Procedure: ENDOVASCULAR AORTIC ANEURYSM REPAIR WITH GORE (EVAR);  Surgeon: Dewyane Victoria MD;  Location: [facility];  Service: Vascular   • BRONCHOSCOPY  1/12/17-[facility]    Dr. Moreno   • CATARACT EXTRACTION, BILATERAL     • COLONOSCOPY  08/23/2011    w/EGD-Dr. Tse   • COLONOSCOPY  10/2006    w/EGD-Diony Moy MD   • TONSILLECTOMY         Allergies   Allergen Reactions   • Gabapentin Other (See Comments) and Confusion     Vision loss            Current Outpatient Medications:   •  acetaminophen (TYLENOL) 325 MG tablet, Take 650 mg by mouth Every 6 (Six) Hours As Needed for Mild Pain ., Disp: , Rfl:   •  ALPRAZolam (XANAX) 0.25 MG tablet, Take 2 tablets by mouth every night at bedtime., Disp: , Rfl: 2  •  beclomethasone (QVAR) 40 MCG/ACT inhaler, Inhale 2 puffs 2 (Two) Times a Day., Disp: , Rfl:   •  Dextran 70-Hypromellose (ARTIFICIAL TEARS) 0.1-0.3 % solution, Apply 2 drops to eye(s) as directed by provider 2 (Two) Times a Day As Needed., Disp: , Rfl:   •  digoxin (LANOXIN) 125 MCG tablet, Take 125 mcg by mouth Every Morning. Take day of surgery, Disp: , Rfl:   •  diltiaZEM (CARDIZEM) 30 MG tablet, Take 15 mg by mouth 4 (Four) Times a Day. Take day of surgery, Disp: , Rfl:   •  furosemide (LASIX) 40 MG tablet, Take 60 mg by mouth Daily With Breakfast. Do not take day of surgery, Disp: , Rfl:   •  ipratropium-albuterol (COMBIVENT RESPIMAT)  MCG/ACT inhaler, Inhale 1 puff 4 (Four) Times a Day As Needed for Wheezing., Disp: , Rfl:   •  ipratropium-albuterol (DUO-NEB) 0.5-2.5 mg/3 ml nebulizer, Take 3 mL by nebulization 4 (Four) Times a Day., Disp: , Rfl:   •  Liniments (BLUE-EMU SUPER STRENGTH) cream, Apply  topically., Disp: , Rfl:   •  magnesium oxide (MAGOX) 400 (241.3 Mg) MG tablet tablet, Take 400 mg by mouth Daily As Needed., Disp: , Rfl:   •  metFORMIN (FORTAMET) 500 MG (OSM) 24 hr tablet, Take 1,000 mg by mouth every night at bedtime. Do not take 48 hours prior to surgery, Disp: , Rfl:   •  metFORMIN ER (GLUCOPHAGE-XR) 500 MG 24 hr tablet, Take 500 mg by mouth Every Morning. Do not take 48 hours prior to surgery.  Last dose to be 09/21 before 0800, Disp: , Rfl: 5  •  potassium chloride (KLOR-CON) 20 MEQ packet, Take 20 mEq by mouth Daily. Take day of surgery, Disp: , Rfl:   •  rOPINIRole (REQUIP) 0.5 MG tablet, Take 0.5 mg by mouth 2 (Two) Times a Day., Disp: , Rfl: 4  •  tamsulosin (FLOMAX) 0.4 MG capsule 24 hr capsule, Take 1 capsule  by mouth Daily., Disp: 30 capsule, Rfl: 0  •  tamsulosin (FLOMAX) 0.4 MG capsule 24 hr capsule, Take 1 capsule by mouth Every Night., Disp: 30 capsule, Rfl: 0  •  warfarin (COUMADIN) 6 MG tablet, Take 6 mg by mouth Daily. Instructed to stop 5 days prior to surgery, 9-18-19 per surgeon (ok'd per Dr. Lewis, prescribing), Disp: , Rfl:     Social History     Socioeconomic History   • Marital status:      Spouse name: Monalisa   • Number of children: Not on file   • Years of education: Not on file   • Highest education level: Not on file   Occupational History   • Occupation: RETIRED   Tobacco Use   • Smoking status: Former Smoker     Years: 2.00     Types: Cigarettes   • Smokeless tobacco: Never Used   • Tobacco comment: 5 Cigs/Day x 58+ Yrs   Substance and Sexual Activity   • Alcohol use: No     Comment: Hx Alcohol Abuse   • Drug use: No   • Sexual activity: Defer       Family History   Problem Relation Age of Onset   • Diabetes Sister      Review of Systems   Constitutional: Positive for fatigue.   Respiratory: Positive for shortness of breath.    Cardiovascular: Negative for chest pain, palpitations and leg swelling.   Neurological: Negative for dizziness and syncope.   Psychiatric/Behavioral: The patient is nervous/anxious.    All other systems reviewed and are negative.      Physical Exam:    Vital Signs:  Weight: 73 kg (161 lb)   Body mass index is 23.1 kg/m².  Temp: 97.4 °F (36.3 °C)   Heart Rate: 78   BP: 130/85     Physical Exam   Constitutional: He is oriented to person, place, and time. He appears well-developed and well-nourished.   HENT:   Head: Normocephalic and atraumatic.   Nose: Nose normal.   Mouth/Throat: Oropharynx is clear and moist.   Eyes: Conjunctivae are normal. Pupils are equal, round, and reactive to light.   Neck: Normal range of motion. Neck supple. No JVD present. No thyromegaly present.   Cardiovascular: Normal rate, regular rhythm, normal heart sounds and intact distal pulses. Exam  reveals no gallop and no friction rub.   No murmur heard.  Pulses:       Radial pulses are 2+ on the right side, and 2+ on the left side.        Dorsalis pedis pulses are 2+ on the right side, and 2+ on the left side.   Pulmonary/Chest: Effort normal and breath sounds normal. He has no rales.   Abdominal: Soft. Bowel sounds are normal. He exhibits no distension and no mass. There is no tenderness.   Musculoskeletal: Normal range of motion. He exhibits no tenderness or deformity.   Lymphadenopathy:     He has no cervical adenopathy.   Neurological: He is alert and oriented to person, place, and time. He has normal reflexes.   Skin: Skin is warm and dry. No rash noted. No erythema.   Psychiatric: He has a normal mood and affect. His behavior is normal.   Pulsatile mass in the abdominal aorta     Assessment:     Problem List Items Addressed This Visit        Cardiovascular and Mediastinum    Ascending aortic aneurysm (CMS/HCC) - Primary    PAF (paroxysmal atrial fibrillation) (CMS/HCC)    Abdominal aortic aneurysm (AAA) without rupture (CMS/HCC)    Non-rheumatic tricuspid valve insufficiency    AAA (abdominal aortic aneurysm) without rupture (CMS/HCC)       Respiratory    Incidental lung nodule, > 3mm and < 8mm    COPD mixed type (CMS/HCC)       Digestive    Alcoholic cirrhosis of liver without ascites (CMS/HCC)        1. Ascending aortic aneurysm, 5 cm.  Positive family history of aneurysms and rupture  2. AAA status post repair  3. COPD, unknown severity  4. Liver cirrhosis due to alcohol abuse, unknown severity or meld score  5. Hypertension, well controlled    Recommendation/Plan:     1. In terms of the aneurysm discussed with him the size and the guidelines for treatment.  The size of the aneurysm is similar to the previous CT scan in the thoracic area.  I would favor a chest CTA and office visit in 6 months.  At that time also he should have an echocardiogram.  2. AAA status post repair.  No evidence of  enlargement  3. COPD, and no severity.  He should have PFTs and pulmonary consultation to further address severity  4. Liver cirrhosis, without ascites.  I will address severity and meld score before any surgical procedure    She is very anxious about his aneurysm.  I discussed with him the risk of surgery would be high.  I will see him in 6 months and will decide on surgery and a complete risk.  I would like to wait until the aneurysm achieves a size of 5.5 cm.  Most likely, based on his anxiety, I will discuss surgery in the next 6 months if the risk is acceptable    Thank you for allowing me to participate in his care.    Regards,    Julián Mina M.D.

## 2020-03-06 ENCOUNTER — OFFICE VISIT (OUTPATIENT)
Dept: CARDIOLOGY | Facility: CLINIC | Age: 80
End: 2020-03-06

## 2020-03-06 VITALS
HEIGHT: 70 IN | BODY MASS INDEX: 23.34 KG/M2 | WEIGHT: 163 LBS | DIASTOLIC BLOOD PRESSURE: 64 MMHG | SYSTOLIC BLOOD PRESSURE: 122 MMHG | HEART RATE: 81 BPM | OXYGEN SATURATION: 82 %

## 2020-03-06 DIAGNOSIS — I71.21 ASCENDING AORTIC ANEURYSM (HCC): Primary | ICD-10-CM

## 2020-03-06 DIAGNOSIS — I34.0 NONRHEUMATIC MITRAL VALVE REGURGITATION: ICD-10-CM

## 2020-03-06 DIAGNOSIS — J44.9 COPD MIXED TYPE (HCC): ICD-10-CM

## 2020-03-06 DIAGNOSIS — I71.40 ABDOMINAL AORTIC ANEURYSM (AAA) WITHOUT RUPTURE (HCC): ICD-10-CM

## 2020-03-06 DIAGNOSIS — I48.0 PAF (PAROXYSMAL ATRIAL FIBRILLATION) (HCC): ICD-10-CM

## 2020-03-06 DIAGNOSIS — K70.30 ALCOHOLIC CIRRHOSIS OF LIVER WITHOUT ASCITES (HCC): ICD-10-CM

## 2020-03-06 PROCEDURE — 99214 OFFICE O/P EST MOD 30 MIN: CPT | Performed by: INTERNAL MEDICINE

## 2020-03-06 PROCEDURE — 93000 ELECTROCARDIOGRAM COMPLETE: CPT | Performed by: INTERNAL MEDICINE

## 2020-03-06 NOTE — PROGRESS NOTES
Subjective:     Encounter Date:03/06/2020      Patient ID: Giles Frost is a 79 y.o. male.    Chief Complaint: A-Fib, AAA  History of Present Illness    78-year-old white male patient with known history of COPD paroxysmal atrial fibrillation ascending aortic root aneurysm abdominal aneurysm comes back for follow-up  2019 underwent abdominal aortic aneurysm repair  Patient still have ascending aortic root aneurysm up to 5 cm  Patient Holter monitor previously showed atrial fibrillation  Stress Myoview July 2019 reverse redistribution no ischemia  Echocardiogram showed moderate mitral insufficiency June 2019 RV enlargement severe pulmonary hypertension  Patient was seen by CV surgery in November 2019 advised repeat follow-up in the next 6 months and also repeat echocardiogram  I called the CV surgery office myself and patient will have an appointment in April or May  We will get the echocardiogram done in the office here  Meanwhile continue anticoagulation aggressive blood pressure control  Patient is on calcium channel blockers may not tolerate beta-blockers    The following portions of the patient's history were reviewed and updated as appropriate: Allergies current medications past family history past medical history past social history past surgical history problem list and review of systems  Past Medical History:   Diagnosis Date   • AAA (abdominal aortic aneurysm) (CMS/HCC)    • Alcohol abuse     Hx   • Alcoholic cirrhosis (CMS/HCC)    • Ankle edema, bilateral     at times   • Anxiety     Welbutrin   • Anxiety    • Aortic aneurysm (CMS/HCC) 09/19/2018    Ascending Measuring 4.7CM Noted on CT Chest   • Atrial fibrillation (CMS/HCC)    • Cardiomegaly 09/19/2018    CT Chest   • Centrilobular emphysema (CMS/HCC) 09/19/2018    On CT Chest   • CHF (congestive heart failure) (CMS/HCC)    • Cholecystolithiasis 09/19/2018    On CT Chest   • Chronic liver disease    • COPD (chronic obstructive pulmonary disease)  "(CMS/[facility])    • Depression    • Descending thoracic aortic aneurysm (CMS/HCC) 09/19/2018    On Chest CT-4.2CM Near Diaphragmatic Hiatus and 4.8CM at Main Pulmonary Artery   • Diabetes mellitus (CMS/HCC)    • Diarrhea    • History of echocardiogram 12/20/16 & 8/2/17-[facility]   • History of EKG 12/2016    Atrial Fib   • Hx of chest x-ray 12/18/2016    1 V   • Hx of chest x-ray 08/2011   • Hx of CT scan of chest 09/19/18-[facility]    Enlarged Heart Size; Coronary Artery and Aortic Atherosclerotic Calcifications; Ascending Aorta Measuring 4.7CM;    • Long term current use of anticoagulant    • On home oxygen therapy     2 LPM QHS   • Paraseptal emphysema (CMS/HCC) 09/19/2018    On CT Chest   • Pleural nodule 09/19/2018    On CT Chest-6MM   • Pneumonia 06/2019    Hx   • Pulmonary hypertension (CMS/HCC)    • Pulmonary nodules 09/19/2018    R on CT Chest   • Respiratory failure (CMS/[facility]) Hypoxic   • Sleep apnea     uses only O2 QHS     Past Surgical History:   Procedure Laterality Date   • ABDOMINAL AORTIC ANEURYSM REPAIR WITH ENDOGRAFT N/A 9/23/2019    Procedure: ENDOVASCULAR AORTIC ANEURYSM REPAIR WITH GORE (EVAR);  Surgeon: Dewayne Victoria MD;  Location: [facility];  Service: Vascular   • BRONCHOSCOPY  1/12/17-[facility]    Dr. Moreno   • CATARACT EXTRACTION, BILATERAL     • COLONOSCOPY  08/23/2011    w/EGD-Dr. Tse   • COLONOSCOPY  10/2006    w/EGD-Diony Moy MD   • TONSILLECTOMY       /64 (BP Location: Left arm, Patient Position: Sitting, Cuff Size: Adult)   Pulse 81   Ht 177.8 cm (70\")   Wt 73.9 kg (163 lb)   SpO2 (!) 82% Comment: room air.  BMI 23.39 kg/m²   Family History   Problem Relation Age of Onset   • Diabetes Sister        Current Outpatient Medications:   •  acetaminophen (TYLENOL) 325 MG tablet, Take 650 mg by mouth Every 6 (Six) Hours As Needed for Mild Pain ., Disp: , Rfl:   •  ALPRAZolam (XANAX) 0.25 MG tablet, Take 2 tablets by mouth every night at bedtime., Disp: , Rfl: 2  •  " beclomethasone (QVAR) 40 MCG/ACT inhaler, Inhale 2 puffs 2 (Two) Times a Day., Disp: , Rfl:   •  Dextran 70-Hypromellose (ARTIFICIAL TEARS) 0.1-0.3 % solution, Apply 2 drops to eye(s) as directed by provider 2 (Two) Times a Day As Needed., Disp: , Rfl:   •  digoxin (LANOXIN) 125 MCG tablet, Take 125 mcg by mouth Every Morning. Take day of surgery, Disp: , Rfl:   •  diltiaZEM (CARDIZEM) 30 MG tablet, Take 15 mg by mouth 4 (Four) Times a Day. Take day of surgery, Disp: , Rfl:   •  furosemide (LASIX) 40 MG tablet, Take 60 mg by mouth Daily With Breakfast. Do not take day of surgery, Disp: , Rfl:   •  ipratropium-albuterol (COMBIVENT RESPIMAT)  MCG/ACT inhaler, Inhale 1 puff 4 (Four) Times a Day As Needed for Wheezing., Disp: , Rfl:   •  ipratropium-albuterol (DUO-NEB) 0.5-2.5 mg/3 ml nebulizer, Take 3 mL by nebulization 4 (Four) Times a Day., Disp: , Rfl:   •  Liniments (BLUE-EMU SUPER STRENGTH) cream, Apply  topically., Disp: , Rfl:   •  magnesium oxide (MAGOX) 400 (241.3 Mg) MG tablet tablet, Take 400 mg by mouth Daily As Needed., Disp: , Rfl:   •  metFORMIN (FORTAMET) 500 MG (OSM) 24 hr tablet, Take 1,000 mg by mouth every night at bedtime. Do not take 48 hours prior to surgery, Disp: , Rfl:   •  potassium chloride (KLOR-CON) 20 MEQ packet, Take 20 mEq by mouth Daily. Take day of surgery, Disp: , Rfl:   •  rOPINIRole (REQUIP) 0.5 MG tablet, Take 0.5 mg by mouth 2 (Two) Times a Day., Disp: , Rfl: 4  •  warfarin (COUMADIN) 6 MG tablet, Take 6 mg by mouth Daily. Instructed to stop 5 days prior to surgery, 9-18-19 per surgeon (ok'd per Dr. Lewis, prescribing), Disp: , Rfl:   •  metFORMIN ER (GLUCOPHAGE-XR) 500 MG 24 hr tablet, Take 500 mg by mouth Every Morning. Do not take 48 hours prior to surgery.  Last dose to be 09/21 before 0800, Disp: , Rfl: 5  •  tamsulosin (FLOMAX) 0.4 MG capsule 24 hr capsule, Take 1 capsule by mouth Daily., Disp: 30 capsule, Rfl: 0  •  tamsulosin (FLOMAX) 0.4 MG capsule 24 hr capsule,  Take 1 capsule by mouth Every Night., Disp: 30 capsule, Rfl: 0  Social History     Socioeconomic History   • Marital status:      Spouse name: Monalisa   • Number of children: Not on file   • Years of education: Not on file   • Highest education level: Not on file   Occupational History   • Occupation: RETIRED   Tobacco Use   • Smoking status: Former Smoker     Years: 2.00     Types: Cigarettes   • Smokeless tobacco: Never Used   • Tobacco comment: 5 Cigs/Day x 58+ Yrs   Substance and Sexual Activity   • Alcohol use: No     Comment: Hx Alcohol Abuse   • Drug use: No   • Sexual activity: Defer     Allergies   Allergen Reactions   • Gabapentin Other (See Comments) and Confusion     Vision loss       Review of Systems   Constitution: Positive for malaise/fatigue. Negative for fever.   HENT: Negative for congestion and hearing loss.    Eyes: Negative for double vision and visual disturbance.   Cardiovascular: Positive for leg swelling. Negative for chest pain, claudication, dyspnea on exertion and syncope.   Respiratory: Positive for shortness of breath. Negative for cough.    Endocrine: Negative for cold intolerance.   Skin: Negative for color change and rash.   Musculoskeletal: Negative for arthritis and joint pain.   Gastrointestinal: Negative for abdominal pain and heartburn.   Genitourinary: Negative for hematuria.   Neurological: Negative for excessive daytime sleepiness and dizziness.   Psychiatric/Behavioral: Negative for depression. The patient is not nervous/anxious.    All other systems reviewed and are negative.             Objective:     Physical Exam   Constitutional: He is oriented to person, place, and time. He appears well-developed and well-nourished. He is cooperative.   HENT:   Head: Normocephalic and atraumatic.   Mouth/Throat: Uvula is midline and oropharynx is clear and moist. No oral lesions.   Eyes: Conjunctivae are normal. No scleral icterus.   Neck: Trachea normal. Neck supple. No JVD  present. Carotid bruit is not present. No thyromegaly present.   Cardiovascular: Normal rate, S1 normal, S2 normal, normal heart sounds, intact distal pulses and normal pulses. An irregularly irregular rhythm present. PMI is not displaced. Exam reveals no gallop and no friction rub.   No murmur heard.  Pulmonary/Chest: Effort normal and breath sounds normal.   Abdominal: Soft. Bowel sounds are normal.   Musculoskeletal: Normal range of motion.   Neurological: He is alert and oriented to person, place, and time. He has normal strength.   No focal deficits   Skin: Skin is warm. No cyanosis.   Psychiatric: He has a normal mood and affect.         ECG 12 Lead  Date/Time: 3/6/2020 11:15 AM  Performed by: Qamar Lau MD  Authorized by: Qamar Lau MD   Comments: Atrial fibrillation with controlled ventricular rate somewhat right axis deviation noted no significant changes compared to the last EKG            Lab Review:       Assessment:          Diagnosis Plan   1. Ascending aortic aneurysm (CMS/HCC)  Ambulatory Referral to Cardiovascular Surgery   2. PAF (paroxysmal atrial fibrillation) (CMS/HCC)  Ambulatory Referral to Cardiovascular Surgery   3. Abdominal aortic aneurysm (AAA) without rupture (CMS/HCC)  Ambulatory Referral to Cardiovascular Surgery   4. COPD mixed type (CMS/HCC)  Ambulatory Referral to Cardiovascular Surgery   5. Alcoholic cirrhosis of liver without ascites (CMS/HCC)  Ambulatory Referral to Cardiovascular Surgery          Plan:       Ascending aortic root aneurysm will have a close follow-up with CV surgery  Moderate mitral insufficiency will have a follow-up echocardiogram prior to his appointment with CV surgery  Paroxysmal atrial fibrillation continue anticoagulation  Abdominal aortic aneurysm status post repair  History of alcoholic cirrhosis currently stable  COPD on oxygen at night

## 2020-03-11 ENCOUNTER — HOSPITAL ENCOUNTER (OUTPATIENT)
Dept: CARDIOLOGY | Facility: HOSPITAL | Age: 80
Discharge: HOME | End: 2020-03-11
Admitting: INTERNAL MEDICINE

## 2020-03-11 VITALS
WEIGHT: 163 LBS | BODY MASS INDEX: 23.34 KG/M2 | HEIGHT: 70 IN | SYSTOLIC BLOOD PRESSURE: 116 MMHG | DIASTOLIC BLOOD PRESSURE: 74 MMHG

## 2020-03-11 DIAGNOSIS — I48.0 PAF (PAROXYSMAL ATRIAL FIBRILLATION) (HCC): ICD-10-CM

## 2020-03-11 DIAGNOSIS — J44.9 COPD MIXED TYPE (HCC): ICD-10-CM

## 2020-03-11 DIAGNOSIS — I71.40 ABDOMINAL AORTIC ANEURYSM (AAA) WITHOUT RUPTURE (HCC): ICD-10-CM

## 2020-03-11 DIAGNOSIS — I71.21 ASCENDING AORTIC ANEURYSM (HCC): ICD-10-CM

## 2020-03-11 DIAGNOSIS — K70.30 ALCOHOLIC CIRRHOSIS OF LIVER WITHOUT ASCITES (HCC): ICD-10-CM

## 2020-03-11 DIAGNOSIS — I34.0 NONRHEUMATIC MITRAL VALVE REGURGITATION: ICD-10-CM

## 2020-03-11 LAB
BH CV ECHO MEAS - ACS: 2.3 CM
BH CV ECHO MEAS - AO MAX PG (FULL): 1.1 MMHG
BH CV ECHO MEAS - AO MAX PG: 2.3 MMHG
BH CV ECHO MEAS - AO MEAN PG (FULL): 0.63 MMHG
BH CV ECHO MEAS - AO MEAN PG: 1.4 MMHG
BH CV ECHO MEAS - AO ROOT AREA: 9.7 CM^2
BH CV ECHO MEAS - AO ROOT DIAM: 3.5 CM
BH CV ECHO MEAS - AO V2 MAX: 76.5 CM/SEC
BH CV ECHO MEAS - AO V2 MEAN: 56.6 CM/SEC
BH CV ECHO MEAS - AO V2 VTI: 12.3 CM
BH CV ECHO MEAS - ASC AORTA: 4.5 CM
BH CV ECHO MEAS - AVA(I,A): 2.6 CM^2
BH CV ECHO MEAS - AVA(I,D): 2.6 CM^2
BH CV ECHO MEAS - AVA(V,A): 2.3 CM^2
BH CV ECHO MEAS - AVA(V,D): 2.3 CM^2
BH CV ECHO MEAS - EDV(CUBED): 136.7 ML
BH CV ECHO MEAS - EDV(TEICH): 126.7 ML
BH CV ECHO MEAS - EF(CUBED): 54.6 %
BH CV ECHO MEAS - EF(TEICH): 46.1 %
BH CV ECHO MEAS - ESV(CUBED): 62 ML
BH CV ECHO MEAS - ESV(TEICH): 68.3 ML
BH CV ECHO MEAS - FS: 23.2 %
BH CV ECHO MEAS - IVS/LVPW: 0.82
BH CV ECHO MEAS - IVSD: 0.7 CM
BH CV ECHO MEAS - LA DIMENSION: 5.1 CM
BH CV ECHO MEAS - LA/AO: 1.4
BH CV ECHO MEAS - LV MASS(C)D: 139.2 GRAMS
BH CV ECHO MEAS - LV MAX PG: 1.2 MMHG
BH CV ECHO MEAS - LV MEAN PG: 0.74 MMHG
BH CV ECHO MEAS - LV V1 MAX: 55.4 CM/SEC
BH CV ECHO MEAS - LV V1 MEAN: 41.7 CM/SEC
BH CV ECHO MEAS - LV V1 VTI: 10.4 CM
BH CV ECHO MEAS - LVIDD: 5.2 CM
BH CV ECHO MEAS - LVIDS: 4 CM
BH CV ECHO MEAS - LVOT AREA: 3.1 CM^2
BH CV ECHO MEAS - LVOT DIAM: 2 CM
BH CV ECHO MEAS - LVPWD: 0.86 CM
BH CV ECHO MEAS - PA ACC TIME: 0.08 SEC
BH CV ECHO MEAS - PA PR(ACCEL): 43.2 MMHG
BH CV ECHO MEAS - QP/QS: 1.9
BH CV ECHO MEAS - RAP SYSTOLE: 15 MMHG
BH CV ECHO MEAS - RV MAX PG: 0.96 MMHG
BH CV ECHO MEAS - RV MEAN PG: 0.52 MMHG
BH CV ECHO MEAS - RV V1 MAX: 49 CM/SEC
BH CV ECHO MEAS - RV V1 MEAN: 34.1 CM/SEC
BH CV ECHO MEAS - RV V1 VTI: 9.7 CM
BH CV ECHO MEAS - RVDD: 5.3 CM
BH CV ECHO MEAS - RVOT AREA: 6.5 CM^2
BH CV ECHO MEAS - RVOT DIAM: 2.9 CM
BH CV ECHO MEAS - RVSP: 70.8 MMHG
BH CV ECHO MEAS - SV(AO): 119.1 ML
BH CV ECHO MEAS - SV(CUBED): 74.7 ML
BH CV ECHO MEAS - SV(LVOT): 32.4 ML
BH CV ECHO MEAS - SV(RVOT): 62.8 ML
BH CV ECHO MEAS - SV(TEICH): 58.4 ML
BH CV ECHO MEAS - TR MAX VEL: 372.8 CM/SEC
LV EF 2D ECHO EST: 55 %
MAXIMAL PREDICTED HEART RATE: 142 BPM
STRESS TARGET HR: 121 BPM

## 2020-03-11 PROCEDURE — 93306 TTE W/DOPPLER COMPLETE: CPT | Performed by: INTERNAL MEDICINE

## 2020-03-11 PROCEDURE — 93306 TTE W/DOPPLER COMPLETE: CPT

## 2020-06-08 ENCOUNTER — OFFICE VISIT (OUTPATIENT)
Dept: CARDIOLOGY | Facility: CLINIC | Age: 80
End: 2020-06-08

## 2020-06-08 VITALS
HEIGHT: 70 IN | SYSTOLIC BLOOD PRESSURE: 116 MMHG | OXYGEN SATURATION: 91 % | BODY MASS INDEX: 23.91 KG/M2 | HEART RATE: 68 BPM | WEIGHT: 167 LBS | DIASTOLIC BLOOD PRESSURE: 69 MMHG

## 2020-06-08 DIAGNOSIS — I71.40 ABDOMINAL AORTIC ANEURYSM (AAA) WITHOUT RUPTURE (HCC): ICD-10-CM

## 2020-06-08 DIAGNOSIS — I48.0 PAF (PAROXYSMAL ATRIAL FIBRILLATION) (HCC): ICD-10-CM

## 2020-06-08 DIAGNOSIS — K70.30 ALCOHOLIC CIRRHOSIS OF LIVER WITHOUT ASCITES (HCC): ICD-10-CM

## 2020-06-08 DIAGNOSIS — I71.21 ASCENDING AORTIC ANEURYSM (HCC): Primary | ICD-10-CM

## 2020-06-08 PROCEDURE — 99214 OFFICE O/P EST MOD 30 MIN: CPT | Performed by: INTERNAL MEDICINE

## 2020-06-08 NOTE — PROGRESS NOTES
Subjective:     Encounter Date:06/08/2020      Patient ID: Giles Frost is a 79 y.o. male.    Chief Complaint: Follow-up atrial fibrillation ascending aortic root aneurysm  History of Present Illness       79-year-old white male patient with known history of COPD paroxysmal atrial fibrillation ascending aortic root aneurysm abdominal aneurysm comes back for follow-up  2019 underwent abdominal aortic aneurysm repair  Patient still have ascending aortic root aneurysm up to 5 cm  Patient Holter monitor previously showed atrial fibrillation  Stress Myoview July 2019 reverse redistribution no ischemia  Echocardiogram showed moderate mitral insufficiency June 2019 RV enlargement severe pulmonary hypertension  Patient was seen by CV surgery in November 2019 advised repeat follow-up   Echocardiogram was repeated June 9, 2020  LVEF 35%  RV is severely dilated  Reduced RV function  Severe tricuspid insufficiency  Patient needs to be started ACE inhibitor's if no contraindication  Patient will see CV surgery tomorrow regarding ascending aortic root aneurysm  Suggest cardiac catheterization will discuss with CV surgery  Meanwhile continue anticoagulation aggressive blood pressure control  Patient is on calcium channel blockers may not tolerate beta-blockers     The following portions of the patient's history were reviewed and updated as appropriate: Allergies current medications past family history past medical history past social history past surgical history problem list and review of systems  The following portions of the patient's history were reviewed and updated as appropriate: Allergies current medications past family history past medical history past social history past surgical history problem list and review of systems  Past Medical History:   Diagnosis Date   • AAA (abdominal aortic aneurysm) (CMS/HCC)    • Alcohol abuse     Hx   • Alcoholic cirrhosis (CMS/HCC)    • Ankle edema, bilateral     at times   •  "Anxiety     Welbutrin   • Anxiety    • Aortic aneurysm (CMS/HCC) 09/19/2018    Ascending Measuring 4.7CM Noted on CT Chest   • Atrial fibrillation (CMS/HCC)    • Cardiomegaly 09/19/2018    CT Chest   • Centrilobular emphysema (CMS/HCC) 09/19/2018    On CT Chest   • CHF (congestive heart failure) (CMS/HCC)    • Cholecystolithiasis 09/19/2018    On CT Chest   • Chronic liver disease    • COPD (chronic obstructive pulmonary disease) (CMS/HCC)    • Depression    • Descending thoracic aortic aneurysm (CMS/HCC) 09/19/2018    On Chest CT-4.2CM Near Diaphragmatic Hiatus and 4.8CM at Main Pulmonary Artery   • Diabetes mellitus (CMS/HCC)    • Diarrhea    • History of echocardiogram 12/20/16 & 8/2/17-[facility]   • History of EKG 12/2016    Atrial Fib   • Hx of chest x-ray 12/18/2016    1 V   • Hx of chest x-ray 08/2011   • Hx of CT scan of chest 09/19/18-[facility]    Enlarged Heart Size; Coronary Artery and Aortic Atherosclerotic Calcifications; Ascending Aorta Measuring 4.7CM;    • Long term current use of anticoagulant    • On home oxygen therapy     2 LPM QHS   • Paraseptal emphysema (CMS/HCC) 09/19/2018    On CT Chest   • Pleural nodule 09/19/2018    On CT Chest-6MM   • Pneumonia 06/2019    Hx   • Pulmonary hypertension (CMS/HCC)    • Pulmonary nodules 09/19/2018    R on CT Chest   • Respiratory failure (CMS/HCC) Hypoxic   • Sleep apnea     uses only O2 QHS     Past Surgical History:   Procedure Laterality Date   • ABDOMINAL AORTIC ANEURYSM REPAIR WITH ENDOGRAFT N/A 9/23/2019    Procedure: ENDOVASCULAR AORTIC ANEURYSM REPAIR WITH GORE (EVAR);  Surgeon: Dewayne Victoria MD;  Location: [facility];  Service: Vascular   • BRONCHOSCOPY  1/12/17-[facility]    Dr. Moreno   • CATARACT EXTRACTION, BILATERAL     • COLONOSCOPY  08/23/2011    w/EGD-Dr. Tse   • COLONOSCOPY  10/2006    w/EGD-Diony Moy MD   • TONSILLECTOMY       /69   Pulse 68   Ht 177.8 cm (70\")   Wt 75.8 kg (167 lb)   SpO2 91%   BMI 23.96 kg/m²   Family " History   Problem Relation Age of Onset   • Diabetes Sister        Current Outpatient Medications:   •  acetaminophen (TYLENOL) 325 MG tablet, Take 650 mg by mouth Every 6 (Six) Hours As Needed for Mild Pain ., Disp: , Rfl:   •  ALPRAZolam (XANAX) 0.25 MG tablet, Take 2 tablets by mouth every night at bedtime., Disp: , Rfl: 2  •  beclomethasone (QVAR) 40 MCG/ACT inhaler, Inhale 2 puffs 2 (Two) Times a Day., Disp: , Rfl:   •  Dextran 70-Hypromellose (ARTIFICIAL TEARS) 0.1-0.3 % solution, Apply 2 drops to eye(s) as directed by provider 2 (Two) Times a Day As Needed., Disp: , Rfl:   •  digoxin (LANOXIN) 125 MCG tablet, Take 125 mcg by mouth Every Morning. Take day of surgery, Disp: , Rfl:   •  diltiaZEM (CARDIZEM) 30 MG tablet, Take 15 mg by mouth 4 (Four) Times a Day. Take day of surgery, Disp: , Rfl:   •  furosemide (LASIX) 40 MG tablet, Take 80 mg by mouth Daily With Breakfast. Do not take day of surgery, Disp: , Rfl:   •  ipratropium-albuterol (COMBIVENT RESPIMAT)  MCG/ACT inhaler, Inhale 1 puff 4 (Four) Times a Day As Needed for Wheezing., Disp: , Rfl:   •  ipratropium-albuterol (DUO-NEB) 0.5-2.5 mg/3 ml nebulizer, Take 3 mL by nebulization 4 (Four) Times a Day., Disp: , Rfl:   •  Liniments (BLUE-EMU SUPER STRENGTH) cream, Apply  topically., Disp: , Rfl:   •  magnesium oxide (MAGOX) 400 (241.3 Mg) MG tablet tablet, Take 400 mg by mouth Daily As Needed., Disp: , Rfl:   •  metFORMIN (FORTAMET) 500 MG (OSM) 24 hr tablet, Take 1,000 mg by mouth every night at bedtime. Do not take 48 hours prior to surgery, Disp: , Rfl:   •  metFORMIN ER (GLUCOPHAGE-XR) 500 MG 24 hr tablet, Take 500 mg by mouth Every Morning. Do not take 48 hours prior to surgery.  Last dose to be 09/21 before 0800, Disp: , Rfl: 5  •  potassium chloride (KLOR-CON) 20 MEQ packet, Take 20 mEq by mouth Daily. Take day of surgery, Disp: , Rfl:   •  rOPINIRole (REQUIP) 0.5 MG tablet, Take 0.5 mg by mouth 2 (Two) Times a Day., Disp: , Rfl: 4  •   tamsulosin (FLOMAX) 0.4 MG capsule 24 hr capsule, Take 1 capsule by mouth Daily., Disp: 30 capsule, Rfl: 0  •  tamsulosin (FLOMAX) 0.4 MG capsule 24 hr capsule, Take 1 capsule by mouth Every Night., Disp: 30 capsule, Rfl: 0  •  warfarin (COUMADIN) 6 MG tablet, Take 6 mg by mouth Daily. Instructed to stop 5 days prior to surgery, 9-18-19 per surgeon (ok'd per Dr. Lewis, prescribing), Disp: , Rfl:   Social History     Socioeconomic History   • Marital status:      Spouse name: Monalisa   • Number of children: Not on file   • Years of education: Not on file   • Highest education level: Not on file   Occupational History   • Occupation: RETIRED   Tobacco Use   • Smoking status: Former Smoker     Years: 2.00     Types: Cigarettes   • Smokeless tobacco: Never Used   • Tobacco comment: 5 Cigs/Day x 58+ Yrs   Substance and Sexual Activity   • Alcohol use: No     Comment: Hx Alcohol Abuse   • Drug use: No   • Sexual activity: Defer     Allergies   Allergen Reactions   • Gabapentin Other (See Comments) and Confusion     Vision loss       Review of Systems   Constitution: Negative for fever and malaise/fatigue.   HENT: Negative for congestion and hearing loss.    Eyes: Negative for double vision and visual disturbance.   Cardiovascular: Positive for leg swelling. Negative for chest pain, claudication, dyspnea on exertion and syncope.   Respiratory: Positive for shortness of breath. Negative for cough.    Endocrine: Negative for cold intolerance.   Skin: Negative for color change and rash.   Musculoskeletal: Negative for arthritis and joint pain.   Gastrointestinal: Negative for abdominal pain and heartburn.   Genitourinary: Negative for hematuria.   Neurological: Negative for excessive daytime sleepiness and dizziness.   Psychiatric/Behavioral: Negative for depression. The patient is not nervous/anxious.    All other systems reviewed and are negative.             Objective:     Physical Exam   Constitutional: He is  oriented to person, place, and time. He appears well-developed and well-nourished. He is cooperative.   HENT:   Head: Normocephalic and atraumatic.   Mouth/Throat: Uvula is midline and oropharynx is clear and moist. No oral lesions.   Eyes: Conjunctivae are normal. No scleral icterus.   Neck: Trachea normal. Neck supple. No JVD present. Carotid bruit is not present. No thyromegaly present.   Cardiovascular: Normal rate, S1 normal, S2 normal, normal heart sounds, intact distal pulses and normal pulses. An irregularly irregular rhythm present. PMI is not displaced. Exam reveals no gallop and no friction rub.   No murmur heard.  Pulmonary/Chest: Effort normal and breath sounds normal.   Abdominal: Soft. Bowel sounds are normal.   Musculoskeletal: Normal range of motion.   Neurological: He is alert and oriented to person, place, and time. He has normal strength.   No focal deficits   Skin: Skin is warm. No cyanosis.   Psychiatric: He has a normal mood and affect.       Procedures    Lab Review:       Assessment:          Diagnosis Plan   1. Ascending aortic aneurysm (CMS/HCC)     2. PAF (paroxysmal atrial fibrillation) (CMS/HCC)     3. Abdominal aortic aneurysm (AAA) without rupture (CMS/HCC)     4. Alcoholic cirrhosis of liver without ascites (CMS/HCC)            Plan:       Ascending aortic root aneurysm will be followed by CV surgery  Today echocardiogram showed worsening LV function  Significant RV dilatation  History of alcoholic cirrhosis without ascites  Patient may need to be considered for cardiac cath if negative for obstructive disease suggest ACE inhibitor's  Previously beta-blockers were not started due to possible underlying lung disease  May need to try beta-blockers ACE inhibitor's and wean off the Cardizem

## 2020-06-09 ENCOUNTER — HOSPITAL ENCOUNTER (OUTPATIENT)
Dept: CARDIOLOGY | Facility: HOSPITAL | Age: 80
Discharge: HOME | End: 2020-06-09

## 2020-06-09 ENCOUNTER — HOSPITAL ENCOUNTER (OUTPATIENT)
Dept: CT IMAGING | Facility: HOSPITAL | Age: 80
Discharge: HOME | End: 2020-06-09
Admitting: THORACIC SURGERY (CARDIOTHORACIC VASCULAR SURGERY)

## 2020-06-09 VITALS
WEIGHT: 167 LBS | BODY MASS INDEX: 23.91 KG/M2 | DIASTOLIC BLOOD PRESSURE: 74 MMHG | RESPIRATION RATE: 20 BRPM | SYSTOLIC BLOOD PRESSURE: 129 MMHG | HEIGHT: 70 IN | HEART RATE: 75 BPM

## 2020-06-09 DIAGNOSIS — I71.21 ASCENDING AORTIC ANEURYSM (HCC): ICD-10-CM

## 2020-06-09 DIAGNOSIS — I36.1 NON-RHEUMATIC TRICUSPID VALVE INSUFFICIENCY: ICD-10-CM

## 2020-06-09 LAB — CREAT BLDA-MCNC: 1.1 MG/DL (ref 0.6–1.3)

## 2020-06-09 PROCEDURE — 93306 TTE W/DOPPLER COMPLETE: CPT

## 2020-06-09 PROCEDURE — 82565 ASSAY OF CREATININE: CPT

## 2020-06-09 PROCEDURE — 0 IOPAMIDOL PER 1 ML: Performed by: THORACIC SURGERY (CARDIOTHORACIC VASCULAR SURGERY)

## 2020-06-09 PROCEDURE — 71275 CT ANGIOGRAPHY CHEST: CPT

## 2020-06-09 RX ADMIN — IOPAMIDOL 100 ML: 755 INJECTION, SOLUTION INTRAVENOUS at 11:38

## 2020-06-10 ENCOUNTER — TELEPHONE (OUTPATIENT)
Dept: CARDIAC SURGERY | Facility: CLINIC | Age: 80
End: 2020-06-10

## 2020-06-12 LAB
BH CV ECHO MEAS - ACS: 2.1 CM
BH CV ECHO MEAS - AO MAX PG (FULL): 2.2 MMHG
BH CV ECHO MEAS - AO MAX PG: 3.7 MMHG
BH CV ECHO MEAS - AO MEAN PG (FULL): 1.8 MMHG
BH CV ECHO MEAS - AO MEAN PG: 2.7 MMHG
BH CV ECHO MEAS - AO ROOT AREA (BSA CORRECTED): 1.8
BH CV ECHO MEAS - AO ROOT AREA: 9.9 CM^2
BH CV ECHO MEAS - AO ROOT DIAM: 3.6 CM
BH CV ECHO MEAS - AO V2 MAX: 96.1 CM/SEC
BH CV ECHO MEAS - AO V2 MEAN: 80.2 CM/SEC
BH CV ECHO MEAS - AO V2 VTI: 23.7 CM
BH CV ECHO MEAS - ASC AORTA: 4.1 CM
BH CV ECHO MEAS - AVA(I,A): 1.8 CM^2
BH CV ECHO MEAS - AVA(I,D): 1.8 CM^2
BH CV ECHO MEAS - AVA(V,A): 2.1 CM^2
BH CV ECHO MEAS - AVA(V,D): 2.1 CM^2
BH CV ECHO MEAS - BSA(HAYCOCK): 1.9 M^2
BH CV ECHO MEAS - BSA: 1.9 M^2
BH CV ECHO MEAS - BZI_BMI: 24 KILOGRAMS/M^2
BH CV ECHO MEAS - BZI_METRIC_HEIGHT: 177.8 CM
BH CV ECHO MEAS - BZI_METRIC_WEIGHT: 75.7 KG
BH CV ECHO MEAS - EDV(CUBED): 70.7 ML
BH CV ECHO MEAS - EDV(MOD-SP2): 72.7 ML
BH CV ECHO MEAS - EDV(MOD-SP4): 70.3 ML
BH CV ECHO MEAS - EDV(TEICH): 75.7 ML
BH CV ECHO MEAS - EF(CUBED): 35.1 %
BH CV ECHO MEAS - EF(MOD-BP): 32 %
BH CV ECHO MEAS - EF(MOD-SP2): 47.7 %
BH CV ECHO MEAS - EF(MOD-SP4): 34.2 %
BH CV ECHO MEAS - EF(TEICH): 29.1 %
BH CV ECHO MEAS - ESV(CUBED): 45.9 ML
BH CV ECHO MEAS - ESV(MOD-SP2): 38 ML
BH CV ECHO MEAS - ESV(MOD-SP4): 46.2 ML
BH CV ECHO MEAS - ESV(TEICH): 53.7 ML
BH CV ECHO MEAS - FS: 13.4 %
BH CV ECHO MEAS - IVS/LVPW: 0.92
BH CV ECHO MEAS - IVSD: 1.3 CM
BH CV ECHO MEAS - LA DIMENSION(2D): 5 CM
BH CV ECHO MEAS - LV DIASTOLIC VOL/BSA (35-75): 36.3 ML/M^2
BH CV ECHO MEAS - LV MASS(C)D: 198.5 GRAMS
BH CV ECHO MEAS - LV MASS(C)DI: 102.7 GRAMS/M^2
BH CV ECHO MEAS - LV MAX PG: 1.5 MMHG
BH CV ECHO MEAS - LV MEAN PG: 0.91 MMHG
BH CV ECHO MEAS - LV SYSTOLIC VOL/BSA (12-30): 23.9 ML/M^2
BH CV ECHO MEAS - LV V1 MAX: 61.4 CM/SEC
BH CV ECHO MEAS - LV V1 MEAN: 45.7 CM/SEC
BH CV ECHO MEAS - LV V1 VTI: 13.1 CM
BH CV ECHO MEAS - LVIDD: 4.1 CM
BH CV ECHO MEAS - LVIDS: 3.6 CM
BH CV ECHO MEAS - LVOT AREA: 3.3 CM^2
BH CV ECHO MEAS - LVOT DIAM: 2 CM
BH CV ECHO MEAS - LVPWD: 1.4 CM
BH CV ECHO MEAS - MR MAX PG: 52.7 MMHG
BH CV ECHO MEAS - MR MAX VEL: 363.1 CM/SEC
BH CV ECHO MEAS - MV MAX PG: 3.6 MMHG
BH CV ECHO MEAS - MV MEAN PG: 0.85 MMHG
BH CV ECHO MEAS - MV V2 MAX: 95.3 CM/SEC
BH CV ECHO MEAS - MV V2 MEAN: 39.9 CM/SEC
BH CV ECHO MEAS - MV V2 VTI: 28.6 CM
BH CV ECHO MEAS - MVA(VTI): 1.5 CM^2
BH CV ECHO MEAS - PA MAX PG: 2.5 MMHG
BH CV ECHO MEAS - PA MEAN PG: 1.4 MMHG
BH CV ECHO MEAS - PA V2 MAX: 78.3 CM/SEC
BH CV ECHO MEAS - PA V2 MEAN: 56.9 CM/SEC
BH CV ECHO MEAS - PA V2 VTI: 15.5 CM
BH CV ECHO MEAS - PI END-D VEL: 137.3 CM/SEC
BH CV ECHO MEAS - PI MAX PG: 12.4 MMHG
BH CV ECHO MEAS - PI MAX VEL: 176.2 CM/SEC
BH CV ECHO MEAS - RAP SYSTOLE: 10 MMHG
BH CV ECHO MEAS - RVDD: 6 CM
BH CV ECHO MEAS - RVOT AREA: 10 CM^2
BH CV ECHO MEAS - RVOT DIAM: 3.6 CM
BH CV ECHO MEAS - RVSP: 58.4 MMHG
BH CV ECHO MEAS - SI(AO): 121.8 ML/M^2
BH CV ECHO MEAS - SI(CUBED): 12.8 ML/M^2
BH CV ECHO MEAS - SI(LVOT): 22.3 ML/M^2
BH CV ECHO MEAS - SI(MOD-SP2): 17.9 ML/M^2
BH CV ECHO MEAS - SI(MOD-SP4): 12.4 ML/M^2
BH CV ECHO MEAS - SI(TEICH): 11.4 ML/M^2
BH CV ECHO MEAS - SV(AO): 235.4 ML
BH CV ECHO MEAS - SV(CUBED): 24.8 ML
BH CV ECHO MEAS - SV(LVOT): 43.1 ML
BH CV ECHO MEAS - SV(MOD-SP2): 34.7 ML
BH CV ECHO MEAS - SV(MOD-SP4): 24 ML
BH CV ECHO MEAS - SV(TEICH): 22 ML
BH CV ECHO MEAS - TR MAX VEL: 347.6 CM/SEC
LV EF 2D ECHO EST: 35 %

## 2020-06-12 PROCEDURE — 93306 TTE W/DOPPLER COMPLETE: CPT | Performed by: INTERNAL MEDICINE

## 2020-06-18 ENCOUNTER — OFFICE VISIT (OUTPATIENT)
Dept: CARDIAC SURGERY | Facility: CLINIC | Age: 80
End: 2020-06-18

## 2020-06-18 VITALS
DIASTOLIC BLOOD PRESSURE: 77 MMHG | TEMPERATURE: 97.4 F | SYSTOLIC BLOOD PRESSURE: 132 MMHG | HEIGHT: 70 IN | HEART RATE: 89 BPM | WEIGHT: 167 LBS | BODY MASS INDEX: 23.91 KG/M2 | OXYGEN SATURATION: 89 % | RESPIRATION RATE: 20 BRPM

## 2020-06-18 DIAGNOSIS — J44.9 COPD MIXED TYPE (HCC): ICD-10-CM

## 2020-06-18 DIAGNOSIS — I36.1 NON-RHEUMATIC TRICUSPID VALVE INSUFFICIENCY: ICD-10-CM

## 2020-06-18 DIAGNOSIS — K70.30 ALCOHOLIC CIRRHOSIS OF LIVER WITHOUT ASCITES (HCC): ICD-10-CM

## 2020-06-18 DIAGNOSIS — I71.40 ABDOMINAL AORTIC ANEURYSM (AAA) WITHOUT RUPTURE (HCC): ICD-10-CM

## 2020-06-18 DIAGNOSIS — I71.21 ASCENDING AORTIC ANEURYSM (HCC): Primary | ICD-10-CM

## 2020-06-18 DIAGNOSIS — I48.0 PAF (PAROXYSMAL ATRIAL FIBRILLATION) (HCC): ICD-10-CM

## 2020-06-18 DIAGNOSIS — I71.40 AAA (ABDOMINAL AORTIC ANEURYSM) WITHOUT RUPTURE (HCC): ICD-10-CM

## 2020-06-18 DIAGNOSIS — R91.1 INCIDENTAL LUNG NODULE, > 3MM AND < 8MM: ICD-10-CM

## 2020-06-18 PROCEDURE — 99213 OFFICE O/P EST LOW 20 MIN: CPT | Performed by: THORACIC SURGERY (CARDIOTHORACIC VASCULAR SURGERY)

## 2020-06-19 NOTE — PROGRESS NOTES
6/19/2020    Seen on 6/18/20    Chief Complaint:     Follow-up evaluation of thoracic aortic aneurysm    History of Present Illness:       Dear Valeriano and Colleagues,  It was nice to see Giles Frost in follow up evaluation for his proximal aortic aneurysm. He is a 79 y.o. male with a history of multiple medical problems including alcohol abuse with cirrhosis, severe COPD on home oxygen, paroxysmal atrial fibrillation, pulmonary hypertension most likely secondary to interstitial lung disease and RV dysfunction with severe TR and a AAA status post repair.  He has a 5 cm ascending aortic aneurysm without aortic valve involvement. He denies new symptoms in the interval.  Denies chest pain.  He is very anxious about the aneurysm because he has a brother that had a ruptured. His repeat chest CT showed descending aorta at the same size 5 cm.  In some views could be 4.8-4.9 in others 5.1-5.2.  He is here for follow-up    Patient Active Problem List   Diagnosis   • Ascending aortic aneurysm (CMS/HCC)   • Incidental lung nodule, > 3mm and < 8mm   • PAF (paroxysmal atrial fibrillation) (CMS/HCC)   • COPD mixed type (CMS/HCC)   • Abdominal aortic aneurysm (AAA) without rupture (CMS/HCC)   • Non-rheumatic tricuspid valve insufficiency   • Acute abdominal pain   • Alcoholic cirrhosis of liver without ascites (CMS/HCC)   • AAA (abdominal aortic aneurysm) without rupture (CMS/HCC)       Past Medical History:   Diagnosis Date   • AAA (abdominal aortic aneurysm) (CMS/HCC)    • Alcohol abuse     Hx   • Alcoholic cirrhosis (CMS/HCC)    • Ankle edema, bilateral     at times   • Anxiety     Welbutrin   • Anxiety    • Aortic aneurysm (CMS/HCC) 09/19/2018    Ascending Measuring 4.7CM Noted on CT Chest   • Atrial fibrillation (CMS/HCC)    • Cardiomegaly 09/19/2018    CT Chest   • Centrilobular emphysema (CMS/HCC) 09/19/2018    On CT Chest   • CHF (congestive heart failure) (CMS/HCC)    • Cholecystolithiasis 09/19/2018    On CT Chest    • Chronic liver disease    • COPD (chronic obstructive pulmonary disease) (CMS/HCC)    • Depression    • Descending thoracic aortic aneurysm (CMS/HCC) 09/19/2018    On Chest CT-4.2CM Near Diaphragmatic Hiatus and 4.8CM at Main Pulmonary Artery   • Diabetes mellitus (CMS/HCC)    • Diarrhea    • History of echocardiogram 12/20/16 & 8/2/17-[facility]   • History of EKG 12/2016    Atrial Fib   • Hx of chest x-ray 12/18/2016    1 V   • Hx of chest x-ray 08/2011   • Hx of CT scan of chest 09/19/18-[facility]    Enlarged Heart Size; Coronary Artery and Aortic Atherosclerotic Calcifications; Ascending Aorta Measuring 4.7CM;    • Long term current use of anticoagulant    • On home oxygen therapy     2 LPM QHS   • Paraseptal emphysema (CMS/HCC) 09/19/2018    On CT Chest   • Pleural nodule 09/19/2018    On CT Chest-6MM   • Pneumonia 06/2019    Hx   • Pulmonary hypertension (CMS/HCC)    • Pulmonary nodules 09/19/2018    R on CT Chest   • Respiratory failure (CMS/HCC) Hypoxic   • Sleep apnea     uses only O2 QHS       Past Surgical History:   Procedure Laterality Date   • ABDOMINAL AORTIC ANEURYSM REPAIR WITH ENDOGRAFT N/A 9/23/2019    Procedure: ENDOVASCULAR AORTIC ANEURYSM REPAIR WITH GORE (EVAR);  Surgeon: Dewayne Victoria MD;  Location: [facility];  Service: Vascular   • BRONCHOSCOPY  1/12/17-[facility]    Dr. Moreno   • CATARACT EXTRACTION, BILATERAL     • COLONOSCOPY  08/23/2011    w/EGD-Dr. Tse   • COLONOSCOPY  10/2006    w/EGD-Diony Moy MD   • TONSILLECTOMY         Allergies   Allergen Reactions   • Gabapentin Other (See Comments) and Confusion     Vision loss           Current Outpatient Medications:   •  acetaminophen (TYLENOL) 325 MG tablet, Take 650 mg by mouth Every 6 (Six) Hours As Needed for Mild Pain ., Disp: , Rfl:   •  ALPRAZolam (XANAX) 0.25 MG tablet, Take 2 tablets by mouth every night at bedtime., Disp: , Rfl: 2  •  beclomethasone (QVAR) 40 MCG/ACT inhaler, Inhale 2 puffs 2 (Two) Times a Day., Disp: ,  Rfl:   •  Dextran 70-Hypromellose (ARTIFICIAL TEARS) 0.1-0.3 % solution, Apply 2 drops to eye(s) as directed by provider 2 (Two) Times a Day As Needed., Disp: , Rfl:   •  digoxin (LANOXIN) 125 MCG tablet, Take 125 mcg by mouth Every Morning. Take day of surgery, Disp: , Rfl:   •  diltiaZEM (CARDIZEM) 30 MG tablet, Take 15 mg by mouth 4 (Four) Times a Day. Take day of surgery, Disp: , Rfl:   •  furosemide (LASIX) 40 MG tablet, Take 80 mg by mouth Daily With Breakfast. Do not take day of surgery, Disp: , Rfl:   •  ipratropium-albuterol (COMBIVENT RESPIMAT)  MCG/ACT inhaler, Inhale 1 puff 4 (Four) Times a Day As Needed for Wheezing., Disp: , Rfl:   •  ipratropium-albuterol (DUO-NEB) 0.5-2.5 mg/3 ml nebulizer, Take 3 mL by nebulization 4 (Four) Times a Day., Disp: , Rfl:   •  Liniments (BLUE-EMU SUPER STRENGTH) cream, Apply  topically., Disp: , Rfl:   •  magnesium oxide (MAGOX) 400 (241.3 Mg) MG tablet tablet, Take 400 mg by mouth Daily As Needed., Disp: , Rfl:   •  metFORMIN (FORTAMET) 500 MG (OSM) 24 hr tablet, Take 1,000 mg by mouth every night at bedtime. Do not take 48 hours prior to surgery, Disp: , Rfl:   •  metFORMIN ER (GLUCOPHAGE-XR) 500 MG 24 hr tablet, Take 500 mg by mouth Every Morning. Do not take 48 hours prior to surgery.  Last dose to be 09/21 before 0800, Disp: , Rfl: 5  •  potassium chloride (KLOR-CON) 20 MEQ packet, Take 20 mEq by mouth Daily. Take day of surgery, Disp: , Rfl:   •  rOPINIRole (REQUIP) 0.5 MG tablet, Take 0.5 mg by mouth 2 (Two) Times a Day., Disp: , Rfl: 4  •  tamsulosin (FLOMAX) 0.4 MG capsule 24 hr capsule, Take 1 capsule by mouth Daily., Disp: 30 capsule, Rfl: 0  •  tamsulosin (FLOMAX) 0.4 MG capsule 24 hr capsule, Take 1 capsule by mouth Every Night., Disp: 30 capsule, Rfl: 0  •  warfarin (COUMADIN) 6 MG tablet, Take 6 mg by mouth Daily. Instructed to stop 5 days prior to surgery, 9-18-19 per surgeon (ok'd per Dr. Lewis, prescribing), Disp: , Rfl:     Social History      Socioeconomic History   • Marital status:      Spouse name: Monalisa   • Number of children: Not on file   • Years of education: Not on file   • Highest education level: Not on file   Occupational History   • Occupation: RETIRED   Tobacco Use   • Smoking status: Former Smoker     Years: 2.00     Types: Cigarettes   • Smokeless tobacco: Never Used   • Tobacco comment: 5 Cigs/Day x 58+ Yrs   Substance and Sexual Activity   • Alcohol use: No     Comment: Hx Alcohol Abuse   • Drug use: No   • Sexual activity: Defer       Family History   Problem Relation Age of Onset   • Diabetes Sister      Review of Systems   Constitutional: Positive for fatigue.   Respiratory: Positive for shortness of breath and wheezing.    Cardiovascular: Positive for palpitations. Negative for chest pain and leg swelling.   Genitourinary: Negative for flank pain and hematuria.   Neurological: Positive for weakness.   Psychiatric/Behavioral: The patient is nervous/anxious.    All other systems reviewed and are negative.      Physical Exam:    Vital Signs:  Weight: 75.8 kg (167 lb)   Body mass index is 23.96 kg/m².  Temp: 97.4 °F (36.3 °C)   Heart Rate: 89   BP: 132/77     Physical Exam   Constitutional: He is oriented to person, place, and time. He appears well-developed and well-nourished.   HENT:   Head: Normocephalic and atraumatic.   Nose: Nose normal.   Mouth/Throat: Oropharynx is clear and moist.   Eyes: Pupils are equal, round, and reactive to light. Conjunctivae are normal.   Neck: Normal range of motion. Neck supple. No JVD present. No thyromegaly present.   Cardiovascular: Normal rate, regular rhythm, S1 normal, S2 normal and intact distal pulses. PMI is not displaced. Exam reveals no gallop, no friction rub and no decreased pulses.   No murmur heard.  Pulses:       Radial pulses are 2+ on the right side, and 2+ on the left side.        Posterior tibial pulses are 2+ on the right side, and 2+ on the left side.    Pulmonary/Chest: Accessory muscle usage present. No respiratory distress. He has decreased breath sounds in the right lower field and the left lower field. He has wheezes in the right middle field and the left middle field. He has no rales.   Abdominal: Soft. Bowel sounds are normal. He exhibits no distension and no mass. There is no tenderness.   Musculoskeletal: Normal range of motion. He exhibits no tenderness or deformity.   Lymphadenopathy:     He has no cervical adenopathy.   Neurological: He is alert and oriented to person, place, and time. He has normal reflexes.   Skin: Skin is warm and dry. No rash noted. No erythema.   Psychiatric: He has a normal mood and affect. His behavior is normal.        Assessment:     Problem List Items Addressed This Visit        Cardiovascular and Mediastinum    Ascending aortic aneurysm (CMS/HCC) - Primary    PAF (paroxysmal atrial fibrillation) (CMS/HCC)    Abdominal aortic aneurysm (AAA) without rupture (CMS/HCC)    Non-rheumatic tricuspid valve insufficiency    AAA (abdominal aortic aneurysm) without rupture (CMS/HCC)       Respiratory    Incidental lung nodule, > 3mm and < 8mm    COPD mixed type (CMS/HCC)       Digestive    Alcoholic cirrhosis of liver without ascites (CMS/HCC)          1. Stable 5 cm ascending aortic aneurysm, asymptomatic  2. Abdominal aortic aneurysm status post Endo grafting  3. Severe COPD with home oxygen  4. Liver cirrhosis due to alcohol abuse  5. Anxiety    Recommendation/Plan:     1. Discussed with the patient that his ascending aorta is stable in size.  There is no evidence of a bicuspid aortopathy.  He has a strong family history for aneurysms and he himself had an abdominal aortic aneurysm treated.  Usually with treatment annulus between 5 and 5.5 cm but his level of comorbidities and surgical risk is extremely high if not prohibitive.  I recommend medical management with strict control of the DP DT and keeping systolic blood pressure below  130 mmHg and a chest CT, ultrasound and PFTs in 6 months.  He verbalized understanding and agree with the plan    Thank you for allowing me to participate in his care.    Regards,    Julián Mina M.D.

## 2020-06-26 ENCOUNTER — OFFICE VISIT (OUTPATIENT)
Dept: CARDIOLOGY | Facility: CLINIC | Age: 80
End: 2020-06-26

## 2020-06-26 VITALS
HEIGHT: 70 IN | BODY MASS INDEX: 24.39 KG/M2 | DIASTOLIC BLOOD PRESSURE: 75 MMHG | WEIGHT: 170.4 LBS | HEART RATE: 66 BPM | SYSTOLIC BLOOD PRESSURE: 144 MMHG | OXYGEN SATURATION: 97 %

## 2020-06-26 DIAGNOSIS — I48.0 PAF (PAROXYSMAL ATRIAL FIBRILLATION) (HCC): ICD-10-CM

## 2020-06-26 DIAGNOSIS — I50.82 BIVENTRICULAR CONGESTIVE HEART FAILURE (HCC): ICD-10-CM

## 2020-06-26 DIAGNOSIS — K70.30 ALCOHOLIC CIRRHOSIS OF LIVER WITHOUT ASCITES (HCC): ICD-10-CM

## 2020-06-26 DIAGNOSIS — I71.21 ASCENDING AORTIC ANEURYSM (HCC): Primary | ICD-10-CM

## 2020-06-26 PROBLEM — I48.91 ATRIAL FIBRILLATION (HCC): Status: ACTIVE | Noted: 2020-06-26

## 2020-06-26 PROCEDURE — 99214 OFFICE O/P EST MOD 30 MIN: CPT | Performed by: INTERNAL MEDICINE

## 2020-06-26 NOTE — PROGRESS NOTES
Subjective:     Encounter Date:06/26/2020      Patient ID: Giles Frost is a 79 y.o. male.    Chief Complaint: Follow-up  atrial Fibrillation and cardiomyopathy  History of Present Illness     79-year-old white male patient with known history of COPD paroxysmal atrial fibrillation ascending aortic root aneurysm abdominal aneurysm comes back for follow-up  2019 underwent abdominal aortic aneurysm repair  Patient still have ascending aortic root aneurysm up to 5 cm    Stress Myoview July 2019 reverse redistribution no ischemia  Echocardiogram showed moderate mitral insufficiency June 2019 RV enlargement severe pulmonary hypertension    Echocardiogram was repeated June 9, 2020  LVEF 35%  RV is severely dilated  Reduced RV function  Severe tricuspid insufficiency  Patient needs to be started ACE inhibitor's if no contraindication  Patient was seen by CV surgery and currently decided patient at high risk for aneurysm surgery  Meanwhile continue anticoagulation aggressive blood pressure control  Patient is on calcium channel blockers may not tolerate beta-blockers  Patient is having increasing symptoms of shortness of breath his last echo EF was 55 which is getting worse  So advised right and left cardiac catheterization in the near future    The following portions of the patient's history were reviewed and updated as appropriate: Allergies current medications past family history past medical history past social history past surgical history problem list and review of systems  Past Medical History:   Diagnosis Date   • AAA (abdominal aortic aneurysm) (CMS/HCC)    • Alcohol abuse     Hx   • Alcoholic cirrhosis (CMS/HCC)    • Ankle edema, bilateral     at times   • Anxiety     Welbutrin   • Anxiety    • Aortic aneurysm (CMS/HCC) 09/19/2018    Ascending Measuring 4.7CM Noted on CT Chest   • Atrial fibrillation (CMS/HCC)    • Cardiomegaly 09/19/2018    CT Chest   • Centrilobular emphysema (CMS/HCC) 09/19/2018    On CT  "Chest   • CHF (congestive heart failure) (CMS/HCC)    • Cholecystolithiasis 09/19/2018    On CT Chest   • Chronic liver disease    • COPD (chronic obstructive pulmonary disease) (CMS/HCC)    • Depression    • Descending thoracic aortic aneurysm (CMS/HCC) 09/19/2018    On Chest CT-4.2CM Near Diaphragmatic Hiatus and 4.8CM at Main Pulmonary Artery   • Diabetes mellitus (CMS/HCC)    • Diarrhea    • History of echocardiogram 12/20/16 & 8/2/17-[facility]   • History of EKG 12/2016    Atrial Fib   • Hx of chest x-ray 12/18/2016    1 V   • Hx of chest x-ray 08/2011   • Hx of CT scan of chest 09/19/18-[facility]    Enlarged Heart Size; Coronary Artery and Aortic Atherosclerotic Calcifications; Ascending Aorta Measuring 4.7CM;    • Long term current use of anticoagulant    • On home oxygen therapy     2 LPM QHS   • Paraseptal emphysema (CMS/HCC) 09/19/2018    On CT Chest   • Pleural nodule 09/19/2018    On CT Chest-6MM   • Pneumonia 06/2019    Hx   • Pulmonary hypertension (CMS/HCC)    • Pulmonary nodules 09/19/2018    R on CT Chest   • Respiratory failure (CMS/HCC) Hypoxic   • Sleep apnea     uses only O2 QHS     Past Surgical History:   Procedure Laterality Date   • ABDOMINAL AORTIC ANEURYSM REPAIR WITH ENDOGRAFT N/A 9/23/2019    Procedure: ENDOVASCULAR AORTIC ANEURYSM REPAIR WITH GORE (EVAR);  Surgeon: Dewayne Victoria MD;  Location: [facility];  Service: Vascular   • BRONCHOSCOPY  1/12/17-[facility]    Dr. Moreno   • CATARACT EXTRACTION, BILATERAL     • COLONOSCOPY  08/23/2011    w/EGD-Dr. Tse   • COLONOSCOPY  10/2006    w/EGD-Diony Moy MD   • TONSILLECTOMY       /75 (BP Location: Left arm, Patient Position: Sitting, Cuff Size: Adult)   Pulse 66   Ht 177.8 cm (70\")   Wt 77.3 kg (170 lb 6.4 oz)   SpO2 97%   BMI 24.45 kg/m²   Family History   Problem Relation Age of Onset   • Diabetes Sister        Current Outpatient Medications:   •  acetaminophen (TYLENOL) 325 MG tablet, Take 650 mg by mouth Every 6 (Six) " Hours As Needed for Mild Pain ., Disp: , Rfl:   •  ALPRAZolam (XANAX) 0.25 MG tablet, Take 2 tablets by mouth every night at bedtime., Disp: , Rfl: 2  •  beclomethasone (QVAR) 40 MCG/ACT inhaler, Inhale 2 puffs 2 (Two) Times a Day., Disp: , Rfl:   •  Dextran 70-Hypromellose (ARTIFICIAL TEARS) 0.1-0.3 % solution, Apply 2 drops to eye(s) as directed by provider 2 (Two) Times a Day As Needed., Disp: , Rfl:   •  digoxin (LANOXIN) 125 MCG tablet, Take 125 mcg by mouth Every Morning. Take day of surgery, Disp: , Rfl:   •  diltiaZEM (CARDIZEM) 30 MG tablet, Take 15 mg by mouth 4 (Four) Times a Day. Take day of surgery, Disp: , Rfl:   •  furosemide (LASIX) 40 MG tablet, Take 80 mg by mouth Daily With Breakfast. Do not take day of surgery, Disp: , Rfl:   •  ipratropium-albuterol (COMBIVENT RESPIMAT)  MCG/ACT inhaler, Inhale 1 puff 4 (Four) Times a Day As Needed for Wheezing., Disp: , Rfl:   •  ipratropium-albuterol (DUO-NEB) 0.5-2.5 mg/3 ml nebulizer, Take 3 mL by nebulization 4 (Four) Times a Day., Disp: , Rfl:   •  Liniments (BLUE-EMU SUPER STRENGTH) cream, Apply  topically., Disp: , Rfl:   •  magnesium oxide (MAGOX) 400 (241.3 Mg) MG tablet tablet, Take 400 mg by mouth Daily As Needed., Disp: , Rfl:   •  metFORMIN (FORTAMET) 500 MG (OSM) 24 hr tablet, Take 1,000 mg by mouth every night at bedtime. Do not take 48 hours prior to surgery, Disp: , Rfl:   •  metFORMIN ER (GLUCOPHAGE-XR) 500 MG 24 hr tablet, Take 500 mg by mouth Every Morning. Do not take 48 hours prior to surgery.  Last dose to be 09/21 before 0800, Disp: , Rfl: 5  •  potassium chloride (KLOR-CON) 20 MEQ packet, Take 20 mEq by mouth Daily. Take day of surgery, Disp: , Rfl:   •  rOPINIRole (REQUIP) 0.5 MG tablet, Take 0.5 mg by mouth 2 (Two) Times a Day., Disp: , Rfl: 4  •  tamsulosin (FLOMAX) 0.4 MG capsule 24 hr capsule, Take 1 capsule by mouth Every Night., Disp: 30 capsule, Rfl: 0  •  warfarin (COUMADIN) 6 MG tablet, Take 6 mg by mouth Daily.  Instructed to stop 5 days prior to surgery, 9-18-19 per surgeon (ok'd per Dr. Lewis, prescribing), Disp: , Rfl:   Social History     Socioeconomic History   • Marital status:      Spouse name: Monalisa   • Number of children: Not on file   • Years of education: Not on file   • Highest education level: Not on file   Occupational History   • Occupation: RETIRED   Tobacco Use   • Smoking status: Former Smoker     Years: 2.00     Types: Cigarettes   • Smokeless tobacco: Never Used   • Tobacco comment: 5 Cigs/Day x 58+ Yrs   Substance and Sexual Activity   • Alcohol use: No     Comment: Hx Alcohol Abuse   • Drug use: No   • Sexual activity: Defer     Allergies   Allergen Reactions   • Gabapentin Other (See Comments) and Confusion     Vision loss       Review of Systems   Constitution: Positive for malaise/fatigue. Negative for fever.   HENT: Negative for congestion and hearing loss.    Eyes: Negative for double vision and visual disturbance.   Cardiovascular: Positive for dyspnea on exertion. Negative for chest pain, claudication, leg swelling and syncope.   Respiratory: Positive for shortness of breath. Negative for cough.    Endocrine: Negative for cold intolerance.   Skin: Negative for color change and rash.   Musculoskeletal: Negative for arthritis and joint pain.   Gastrointestinal: Negative for abdominal pain and heartburn.   Genitourinary: Negative for hematuria.   Neurological: Negative for excessive daytime sleepiness and dizziness.   Psychiatric/Behavioral: Negative for depression. The patient is not nervous/anxious.    All other systems reviewed and are negative.             Objective:     Physical Exam   Constitutional: He is oriented to person, place, and time. He appears well-developed and well-nourished. He is cooperative.   HENT:   Head: Normocephalic and atraumatic.   Mouth/Throat: Uvula is midline and oropharynx is clear and moist. No oral lesions.   Eyes: Conjunctivae are normal. No scleral  icterus.   Neck: Trachea normal. Neck supple. No JVD present. Carotid bruit is not present. No thyromegaly present.   Cardiovascular: Normal rate, regular rhythm, S1 normal, S2 normal, normal heart sounds, intact distal pulses and normal pulses. PMI is not displaced. Exam reveals no gallop and no friction rub.   No murmur heard.  Pulmonary/Chest: Effort normal and breath sounds normal.   Abdominal: Soft. Bowel sounds are normal.   Musculoskeletal: Normal range of motion.   Neurological: He is alert and oriented to person, place, and time. He has normal strength.   No focal deficits   Skin: Skin is warm. No cyanosis.   Psychiatric: He has a normal mood and affect.       Procedures    Lab Review:       Assessment:          Diagnosis Plan   1. Ascending aortic aneurysm (CMS/HCC)  Case Request Cath Lab: Right and Left Heart Cath    CBC (No Diff)    Basic Metabolic Panel    Protime-INR    ECG 12 Lead    Adult Transesophageal Echo (OPAL) W/ Cont if Necessary Per Protocol   2. PAF (paroxysmal atrial fibrillation) (CMS/HCC)  Case Request Cath Lab: Right and Left Heart Cath    CBC (No Diff)    Basic Metabolic Panel    Protime-INR    ECG 12 Lead    Adult Transesophageal Echo (OPAL) W/ Cont if Necessary Per Protocol   3. Alcoholic cirrhosis of liver without ascites (CMS/HCC)  Case Request Cath Lab: Right and Left Heart Cath    CBC (No Diff)    Basic Metabolic Panel    Protime-INR    ECG 12 Lead    Adult Transesophageal Echo (OPAL) W/ Cont if Necessary Per Protocol   4. Biventricular congestive heart failure (CMS/HCC)  Case Request Cath Lab: Right and Left Heart Cath    CBC (No Diff)    Basic Metabolic Panel    Protime-INR    ECG 12 Lead    Adult Transesophageal Echo (OPAL) W/ Cont if Necessary Per Protocol          Plan:         Ascending aortic root aneurysm patient is followed by CV surgery at this point very high risk for surgery  Problems with increasing shortness of breath atrial fibrillation worsening cardiomyopathy  Suggest  right and left cardiac cath  Evaluate severe pulmonary hypertension  Needs pulmonary follow-up in the future  If no obstructive CAD atrial fibrillation needs to be aggressively controlled to help with the symptoms  Probably multifactorial  Cirrhosis patient was advised to follow-up with PCP regarding that may need GI evaluation

## 2020-06-26 NOTE — PROGRESS NOTES
Subjective:     Encounter Date:06/26/2020      Patient ID: Bryson De Los Santos is a 79 y.o. male.    Chief Complaint:  History of Present Illness     Primary Visit Coverage     Payer Plan Sponsor Code Group Number Group Name   MEDICARE MEDICARE A & B      Primary Visit Coverage Subscriber     Subscriber ID Subscriber Name Subscriber SSN Subscriber Address   [id number] BRYSON DE LOS SANTOS  [address] [place] [address]   Secondary Visit Coverage     Payer Plan Sponsor Code Group Number Group Name   [facility] [employer] CARE OPTIONS      Secondary Visit Coverage Subscriber     Subscriber ID Subscriber Name Subscriber SSN Subscriber Address   [id number] BRYSON DE LOS SANTOS  [address] [place] [address]   Office Visit     6/8/2020  [facility] CARDIOLOGY   Qamar Lau MD   Cardiology   Ascending aortic aneurysm (CMS/HCC) +3 more   Dx   Atrial Fibrillation   Reason for Visit    Progress Notes     Expand All Collapse All          Subjective:      Encounter Date:06/08/2020        Subjective     Patient ID: Bryson De Los Santos is a 79 y.o. male.     Chief Complaint: Follow-up atrial fibrillation ascending aortic root aneurysm  History of Present Illness         79-year-old white male patient with known history of COPD paroxysmal atrial fibrillation ascending aortic root aneurysm abdominal aneurysm comes back for follow-up  2019 underwent abdominal aortic aneurysm repair  Patient still have ascending aortic root aneurysm up to 5 cm  Patient Holter monitor previously showed atrial fibrillation  Stress Myoview July 2019 reverse redistribution no ischemia  Echocardiogram showed moderate mitral insufficiency June 2019 RV enlargement severe pulmonary hypertension  Patient was seen by CV surgery in November 2019 advised repeat follow-up   Echocardiogram was repeated June 9, 2020  LVEF 35%  RV is severely dilated  Reduced RV function  Severe  tricuspid insufficiency  Patient needs to be started ACE inhibitor's if no contraindication  Patient will see CV surgery tomorrow regarding ascending aortic root aneurysm  Suggest cardiac catheterization will discuss with CV surgery  Meanwhile continue anticoagulation aggressive blood pressure control  Patient is on calcium channel blockers may not tolerate beta-blockers     The following portions of the patient's history were reviewed and updated as appropriate: Allergies current medications past family history past medical history past social history past surgical history problem list and review of systems  The following portions of the patient's history were reviewed and updated as appropriate: Allergies current medications past family history past medical history past social history past surgical history problem list and review of systems  Medical History        Past Medical History:   Diagnosis Date   • AAA (abdominal aortic aneurysm) (CMS/HCC)     • Alcohol abuse       Hx   • Alcoholic cirrhosis (CMS/HCC)     • Ankle edema, bilateral       at times   • Anxiety       Welbutrin   • Anxiety     • Aortic aneurysm (CMS/HCC) 09/19/2018     Ascending Measuring 4.7CM Noted on CT Chest   • Atrial fibrillation (CMS/HCC)     • Cardiomegaly 09/19/2018     CT Chest   • Centrilobular emphysema (CMS/HCC) 09/19/2018     On CT Chest   • CHF (congestive heart failure) (CMS/HCC)     • Cholecystolithiasis 09/19/2018     On CT Chest   • Chronic liver disease     • COPD (chronic obstructive pulmonary disease) (CMS/HCC)     • Depression     • Descending thoracic aortic aneurysm (CMS/HCC) 09/19/2018     On Chest CT-4.2CM Near Diaphragmatic Hiatus and 4.8CM at Main Pulmonary Artery   • Diabetes mellitus (CMS/HCC)     • Diarrhea     • History of echocardiogram 12/20/16 & 8/2/17-[facility]   • History of EKG 12/2016     Atrial Fib   • Hx of chest x-ray 12/18/2016     1 V   • Hx of chest x-ray 08/2011   • Hx of CT scan of chest  "09/19/18-[facility]     Enlarged Heart Size; Coronary Artery and Aortic Atherosclerotic Calcifications; Ascending Aorta Measuring 4.7CM;    • Long term current use of anticoagulant     • On home oxygen therapy       2 LPM QHS   • Paraseptal emphysema (CMS/HCC) 09/19/2018     On CT Chest   • Pleural nodule 09/19/2018     On CT Chest-6MM   • Pneumonia 06/2019     Hx   • Pulmonary hypertension (CMS/HCC)     • Pulmonary nodules 09/19/2018     R on CT Chest   • Respiratory failure (CMS/HCC) Hypoxic   • Sleep apnea       uses only O2 QHS         Surgical History         Past Surgical History:   Procedure Laterality Date   • ABDOMINAL AORTIC ANEURYSM REPAIR WITH ENDOGRAFT N/A 9/23/2019     Procedure: ENDOVASCULAR AORTIC ANEURYSM REPAIR WITH GORE (EVAR);  Surgeon: Dewayne Victoria MD;  Location: [facility];  Service: Vascular   • BRONCHOSCOPY   1/12/17-[facility]     Dr. Moreno   • CATARACT EXTRACTION, BILATERAL       • COLONOSCOPY   08/23/2011     w/EGD-Dr. Tse   • COLONOSCOPY   10/2006     w/EGD-Diony Moy MD   • TONSILLECTOMY             /69   Pulse 68   Ht 177.8 cm (70\")   Wt 75.8 kg (167 lb)   SpO2 91%   BMI 23.96 kg/m²         Family History   Problem Relation Age of Onset   • Diabetes Sister           Current Outpatient Medications:   •  acetaminophen (TYLENOL) 325 MG tablet, Take 650 mg by mouth Every 6 (Six) Hours As Needed for Mild Pain ., Disp: , Rfl:   •  ALPRAZolam (XANAX) 0.25 MG tablet, Take 2 tablets by mouth every night at bedtime., Disp: , Rfl: 2  •  beclomethasone (QVAR) 40 MCG/ACT inhaler, Inhale 2 puffs 2 (Two) Times a Day., Disp: , Rfl:   •  Dextran 70-Hypromellose (ARTIFICIAL TEARS) 0.1-0.3 % solution, Apply 2 drops to eye(s) as directed by provider 2 (Two) Times a Day As Needed., Disp: , Rfl:   •  digoxin (LANOXIN) 125 MCG tablet, Take 125 mcg by mouth Every Morning. Take day of surgery, Disp: , Rfl:   •  diltiaZEM (CARDIZEM) 30 MG tablet, Take 15 mg by mouth 4 (Four) Times a Day. Take " day of surgery, Disp: , Rfl:   •  furosemide (LASIX) 40 MG tablet, Take 80 mg by mouth Daily With Breakfast. Do not take day of surgery, Disp: , Rfl:   •  ipratropium-albuterol (COMBIVENT RESPIMAT)  MCG/ACT inhaler, Inhale 1 puff 4 (Four) Times a Day As Needed for Wheezing., Disp: , Rfl:   •  ipratropium-albuterol (DUO-NEB) 0.5-2.5 mg/3 ml nebulizer, Take 3 mL by nebulization 4 (Four) Times a Day., Disp: , Rfl:   •  Liniments (BLUE-EMU SUPER STRENGTH) cream, Apply  topically., Disp: , Rfl:   •  magnesium oxide (MAGOX) 400 (241.3 Mg) MG tablet tablet, Take 400 mg by mouth Daily As Needed., Disp: , Rfl:   •  metFORMIN (FORTAMET) 500 MG (OSM) 24 hr tablet, Take 1,000 mg by mouth every night at bedtime. Do not take 48 hours prior to surgery, Disp: , Rfl:   •  metFORMIN ER (GLUCOPHAGE-XR) 500 MG 24 hr tablet, Take 500 mg by mouth Every Morning. Do not take 48 hours prior to surgery.  Last dose to be 09/21 before 0800, Disp: , Rfl: 5  •  potassium chloride (KLOR-CON) 20 MEQ packet, Take 20 mEq by mouth Daily. Take day of surgery, Disp: , Rfl:   •  rOPINIRole (REQUIP) 0.5 MG tablet, Take 0.5 mg by mouth 2 (Two) Times a Day., Disp: , Rfl: 4  •  tamsulosin (FLOMAX) 0.4 MG capsule 24 hr capsule, Take 1 capsule by mouth Daily., Disp: 30 capsule, Rfl: 0  •  tamsulosin (FLOMAX) 0.4 MG capsule 24 hr capsule, Take 1 capsule by mouth Every Night., Disp: 30 capsule, Rfl: 0  •  warfarin (COUMADIN) 6 MG tablet, Take 6 mg by mouth Daily. Instructed to stop 5 days prior to surgery, 9-18-19 per surgeon (ok'd per Dr. Lewis, prescribing), Disp: , Rfl:   Social History   Social History            Socioeconomic History   • Marital status:        Spouse name: Monalisa   • Number of children: Not on file   • Years of education: Not on file   • Highest education level: Not on file   Occupational History   • Occupation: RETIRED   Tobacco Use   • Smoking status: Former Smoker       Years: 2.00       Types: Cigarettes   • Smokeless  tobacco: Never Used   • Tobacco comment: 5 Cigs/Day x 58+ Yrs   Substance and Sexual Activity   • Alcohol use: No       Comment: Hx Alcohol Abuse   • Drug use: No   • Sexual activity: Defer               Allergies   Allergen Reactions   • Gabapentin Other (See Comments) and Confusion       Vision loss         Review of Systems   Constitution: Negative for fever and malaise/fatigue.   HENT: Negative for congestion and hearing loss.    Eyes: Negative for double vision and visual disturbance.   Cardiovascular: Positive for leg swelling. Negative for chest pain, claudication, dyspnea on exertion and syncope.   Respiratory: Positive for shortness of breath. Negative for cough.    Endocrine: Negative for cold intolerance.   Skin: Negative for color change and rash.   Musculoskeletal: Negative for arthritis and joint pain.   Gastrointestinal: Negative for abdominal pain and heartburn.   Genitourinary: Negative for hematuria.   Neurological: Negative for excessive daytime sleepiness and dizziness.   Psychiatric/Behavioral: Negative for depression. The patient is not nervous/anxious.    All other systems reviewed and are negative.                 Objective:      Objective     Physical Exam   Constitutional: He is oriented to person, place, and time. He appears well-developed and well-nourished. He is cooperative.   HENT:   Head: Normocephalic and atraumatic.   Mouth/Throat: Uvula is midline and oropharynx is clear and moist. No oral lesions.   Eyes: Conjunctivae are normal. No scleral icterus.   Neck: Trachea normal. Neck supple. No JVD present. Carotid bruit is not present. No thyromegaly present.   Cardiovascular: Normal rate, S1 normal, S2 normal, normal heart sounds, intact distal pulses and normal pulses. An irregularly irregular rhythm present. PMI is not displaced. Exam reveals no gallop and no friction rub.   No murmur heard.  Pulmonary/Chest: Effort normal and breath sounds normal.   Abdominal: Soft. Bowel sounds  "are normal.   Musculoskeletal: Normal range of motion.   Neurological: He is alert and oriented to person, place, and time. He has normal strength.   No focal deficits   Skin: Skin is warm. No cyanosis.   Psychiatric: He has a normal mood and affect.         Procedures     Lab Review:         Assessment:      Assessment            Diagnosis Plan   1. Ascending aortic aneurysm (CMS/HCC)      2. PAF (paroxysmal atrial fibrillation) (CMS/HCC)      3. Abdominal aortic aneurysm (AAA) without rupture (CMS/HCC)      4. Alcoholic cirrhosis of liver without ascites (CMS/HCC)               Plan:      Plan       Ascending aortic root aneurysm will be followed by CV surgery  Today echocardiogram showed worsening LV function  Significant RV dilatation  History of alcoholic cirrhosis without ascites  Patient may need to be considered for cardiac cath if negative for obstructive disease suggest ACE inhibitor's  Previously beta-blockers were not started due to possible underlying lung disease  May need to try beta-blockers ACE inhibitor's and wean off the Cardizem                  Instructions         Return in about 6 months (around 12/8/2020) for EKG.   Additional Documentation     Vitals:    /69    Pulse 68    Ht 177.8 cm (70\")    Wt 75.8 kg (167 lb)    SpO2 91%    BMI 23.96 kg/m²    BSA 1.93 m²    Flowsheets:    Outbreak Screening       Encounter Info:    Billing Info,    History,    Allergies,    Detailed Report,    Prep for Surgery Order Report,    PAF,    Chart Review: Routing History,    Coding Summary,    Encounter Facesheet,    Link Facesheet    ...(6 more)   Orders Placed      None   Medication Changes        None      Medication List    Visit Diagnoses         Ascending aortic aneurysm (CMS/HCC)      PAF (paroxysmal atrial fibrillation) (CMS/HCC)      Abdominal aortic aneurysm (AAA) without rupture (CMS/HCC)      Alcoholic cirrhosis of liver without ascites (CMS/HCC)      Problem List     Current Medications      " Disp Start End   acetaminophen (TYLENOL) 325 MG tablet      Sig - Route: Take 650 mg by mouth Every 6 (Six) Hours As Needed for Mild Pain . - Oral   Class: Historical Med   ALPRAZolam (XANAX) 0.25 MG tablet  8/5/2019    Sig - Route: Take 2 tablets by mouth every night at bedtime. - Oral   Class: Historical Med   beclomethasone (QVAR) 40 MCG/ACT inhaler  5/27/2013    Sig - Route: Inhale 2 puffs 2 (Two) Times a Day. - Inhalation   Class: Historical Med   Dextran 70-Hypromellose (ARTIFICIAL TEARS) 0.1-0.3 % solution      Sig - Route: Apply 2 drops to eye(s) as directed by provider 2 (Two) Times a Day As Needed. - Ophthalmic   Class: Historical Med   digoxin (LANOXIN) 125 MCG tablet  1/27/2017    Sig - Route: Take 125 mcg by mouth Every Morning. Take day of surgery - Oral   Class: Historical Med   diltiaZEM (CARDIZEM) 30 MG tablet  7/28/2017    Sig - Route: Take 15 mg by mouth 4 (Four) Times a Day. Take day of surgery - Oral   Class: Historical Med   furosemide (LASIX) 40 MG tablet  5/27/2013    Sig - Route: Take 80 mg by mouth Daily With Breakfast. Do not take day of surgery - Oral   Class: Historical Med   ipratropium-albuterol (COMBIVENT RESPIMAT)  MCG/ACT inhaler      Sig - Route: Inhale 1 puff 4 (Four) Times a Day As Needed for Wheezing. - Inhalation   Class: Historical Med   ipratropium-albuterol (DUO-NEB) 0.5-2.5 mg/3 ml nebulizer      Sig - Route: Take 3 mL by nebulization 4 (Four) Times a Day. - Nebulization   Class: Historical Med   Liniments (BLUE-EMU SUPER STRENGTH) cream      Sig - Route: Apply  topically. - Apply externally   Class: Historical Med   magnesium oxide (MAGOX) 400 (241.3 Mg) MG tablet tablet      Sig - Route: Take 400 mg by mouth Daily As Needed. - Oral   Class: Historical Med   metFORMIN (FORTAMET) 500 MG (OSM) 24 hr tablet  7/28/2017    Sig - Route: Take 1,000 mg by mouth every night at bedtime. Do not take 48 hours prior to surgery - Oral   Class: Historical Med   metFORMIN ER  (GLUCOPHAGE-XR) 500 MG 24 hr tablet  5/6/2019    Sig - Route: Take 500 mg by mouth Every Morning. Do not take 48 hours prior to surgery.  Last dose to be 09/21 before 0800 - Oral   Class: Historical Med   potassium chloride (KLOR-CON) 20 MEQ packet  5/18/2018    Sig - Route: Take 20 mEq by mouth Daily. Take day of surgery - Oral   Class: Historical Med   rOPINIRole (REQUIP) 0.5 MG tablet  5/10/2019    Sig - Route: Take 0.5 mg by mouth 2 (Two) Times a Day. - Oral   Class: Historical Med   tamsulosin (FLOMAX) 0.4 MG capsule 24 hr capsule 30 capsule 9/24/2019    Sig - Route: Take 1 capsule by mouth Daily. - Oral   tamsulosin (FLOMAX) 0.4 MG capsule 24 hr capsule 30 capsule 9/25/2019    Sig - Route: Take 1 capsule by mouth Every Night. - Oral   warfarin (COUMADIN) 6 MG tablet      Sig - Route: Take 6 mg by mouth Daily. Instructed to stop 5 days prior to surgery, 9-18-19 per surgeon (ok'd per Dr. Lewis, prescribing) - Oral   Class: Historical Med       The following portions of the patient's history were reviewed and updated as appropriate: Allergies current medications past family history past medical history past social history past surgical history problem list and review of systems  Past Medical History:   Diagnosis Date   • AAA (abdominal aortic aneurysm) (CMS/HCC)    • Alcohol abuse     Hx   • Alcoholic cirrhosis (CMS/HCC)    • Ankle edema, bilateral     at times   • Anxiety     Welbutrin   • Anxiety    • Aortic aneurysm (CMS/HCC) 09/19/2018    Ascending Measuring 4.7CM Noted on CT Chest   • Atrial fibrillation (CMS/HCC)    • Cardiomegaly 09/19/2018    CT Chest   • Centrilobular emphysema (CMS/HCC) 09/19/2018    On CT Chest   • CHF (congestive heart failure) (CMS/HCC)    • Cholecystolithiasis 09/19/2018    On CT Chest   • Chronic liver disease    • COPD (chronic obstructive pulmonary disease) (CMS/HCC)    • Depression    • Descending thoracic aortic aneurysm (CMS/HCC) 09/19/2018    On Chest CT-4.2CM Near  Diaphragmatic Hiatus and 4.8CM at Main Pulmonary Artery   • Diabetes mellitus (CMS/HCC)    • Diarrhea    • History of echocardiogram 12/20/16 & 8/2/17-[facility]   • History of EKG 12/2016    Atrial Fib   • Hx of chest x-ray 12/18/2016    1 V   • Hx of chest x-ray 08/2011   • Hx of CT scan of chest 09/19/18-[facility]    Enlarged Heart Size; Coronary Artery and Aortic Atherosclerotic Calcifications; Ascending Aorta Measuring 4.7CM;    • Long term current use of anticoagulant    • On home oxygen therapy     2 LPM QHS   • Paraseptal emphysema (CMS/HCC) 09/19/2018    On CT Chest   • Pleural nodule 09/19/2018    On CT Chest-6MM   • Pneumonia 06/2019    Hx   • Pulmonary hypertension (CMS/HCC)    • Pulmonary nodules 09/19/2018    R on CT Chest   • Respiratory failure (CMS/HCC) Hypoxic   • Sleep apnea     uses only O2 QHS     Past Surgical History:   Procedure Laterality Date   • ABDOMINAL AORTIC ANEURYSM REPAIR WITH ENDOGRAFT N/A 9/23/2019    Procedure: ENDOVASCULAR AORTIC ANEURYSM REPAIR WITH GORE (EVAR);  Surgeon: Dewayne Victoria MD;  Location: [facility];  Service: Vascular   • BRONCHOSCOPY  1/12/17-[facility]    Dr. Moreno   • CATARACT EXTRACTION, BILATERAL     • COLONOSCOPY  08/23/2011    w/EGD-Dr. Tse   • COLONOSCOPY  10/2006    w/EGD-Diony Moy MD   • TONSILLECTOMY       There were no vitals taken for this visit.  Family History   Problem Relation Age of Onset   • Diabetes Sister        Current Outpatient Medications:   •  acetaminophen (TYLENOL) 325 MG tablet, Take 650 mg by mouth Every 6 (Six) Hours As Needed for Mild Pain ., Disp: , Rfl:   •  ALPRAZolam (XANAX) 0.25 MG tablet, Take 2 tablets by mouth every night at bedtime., Disp: , Rfl: 2  •  beclomethasone (QVAR) 40 MCG/ACT inhaler, Inhale 2 puffs 2 (Two) Times a Day., Disp: , Rfl:   •  Dextran 70-Hypromellose (ARTIFICIAL TEARS) 0.1-0.3 % solution, Apply 2 drops to eye(s) as directed by provider 2 (Two) Times a Day As Needed., Disp: , Rfl:   •  digoxin  (LANOXIN) 125 MCG tablet, Take 125 mcg by mouth Every Morning. Take day of surgery, Disp: , Rfl:   •  diltiaZEM (CARDIZEM) 30 MG tablet, Take 15 mg by mouth 4 (Four) Times a Day. Take day of surgery, Disp: , Rfl:   •  furosemide (LASIX) 40 MG tablet, Take 80 mg by mouth Daily With Breakfast. Do not take day of surgery, Disp: , Rfl:   •  ipratropium-albuterol (COMBIVENT RESPIMAT)  MCG/ACT inhaler, Inhale 1 puff 4 (Four) Times a Day As Needed for Wheezing., Disp: , Rfl:   •  ipratropium-albuterol (DUO-NEB) 0.5-2.5 mg/3 ml nebulizer, Take 3 mL by nebulization 4 (Four) Times a Day., Disp: , Rfl:   •  Liniments (BLUE-EMU SUPER STRENGTH) cream, Apply  topically., Disp: , Rfl:   •  magnesium oxide (MAGOX) 400 (241.3 Mg) MG tablet tablet, Take 400 mg by mouth Daily As Needed., Disp: , Rfl:   •  metFORMIN (FORTAMET) 500 MG (OSM) 24 hr tablet, Take 1,000 mg by mouth every night at bedtime. Do not take 48 hours prior to surgery, Disp: , Rfl:   •  metFORMIN ER (GLUCOPHAGE-XR) 500 MG 24 hr tablet, Take 500 mg by mouth Every Morning. Do not take 48 hours prior to surgery.  Last dose to be 09/21 before 0800, Disp: , Rfl: 5  •  potassium chloride (KLOR-CON) 20 MEQ packet, Take 20 mEq by mouth Daily. Take day of surgery, Disp: , Rfl:   •  rOPINIRole (REQUIP) 0.5 MG tablet, Take 0.5 mg by mouth 2 (Two) Times a Day., Disp: , Rfl: 4  •  tamsulosin (FLOMAX) 0.4 MG capsule 24 hr capsule, Take 1 capsule by mouth Daily., Disp: 30 capsule, Rfl: 0  •  tamsulosin (FLOMAX) 0.4 MG capsule 24 hr capsule, Take 1 capsule by mouth Every Night., Disp: 30 capsule, Rfl: 0  •  warfarin (COUMADIN) 6 MG tablet, Take 6 mg by mouth Daily. Instructed to stop 5 days prior to surgery, 9-18-19 per surgeon (ok'd per Dr. Lewis, prescribing), Disp: , Rfl:   Social History     Socioeconomic History   • Marital status:      Spouse name: Monalisa   • Number of children: Not on file   • Years of education: Not on file   • Highest education level: Not on  file   Occupational History   • Occupation: RETIRED   Tobacco Use   • Smoking status: Former Smoker     Years: 2.00     Types: Cigarettes   • Smokeless tobacco: Never Used   • Tobacco comment: 5 Cigs/Day x 58+ Yrs   Substance and Sexual Activity   • Alcohol use: No     Comment: Hx Alcohol Abuse   • Drug use: No   • Sexual activity: Defer     Allergies   Allergen Reactions   • Gabapentin Other (See Comments) and Confusion     Vision loss       ROS           Objective:     Physical Exam    Procedures    Lab Review:       Assessment:         No diagnosis found.       Plan:

## 2020-07-02 ENCOUNTER — TELEPHONE (OUTPATIENT)
Dept: CARDIOLOGY | Facility: CLINIC | Age: 80
End: 2020-07-02

## 2020-07-02 NOTE — TELEPHONE ENCOUNTER
Patient is supposed to have Covid testing on Monday before procedure. Daughter was tested yesterday, and they are asking what he should do?

## 2020-07-03 RX ORDER — WARFARIN SODIUM 3 MG
1.5 TABLET ORAL
COMMUNITY

## 2020-07-03 RX ORDER — WARFARIN SODIUM 3 MG/1
3 TABLET ORAL
COMMUNITY

## 2020-07-06 ENCOUNTER — LAB (OUTPATIENT)
Dept: LAB | Facility: HOSPITAL | Age: 80
End: 2020-07-06

## 2020-07-06 ENCOUNTER — HOSPITAL ENCOUNTER (OUTPATIENT)
Dept: CARDIOLOGY | Facility: HOSPITAL | Age: 80
Discharge: HOME | End: 2020-07-06
Admitting: INTERNAL MEDICINE

## 2020-07-06 DIAGNOSIS — I48.0 PAF (PAROXYSMAL ATRIAL FIBRILLATION) (HCC): ICD-10-CM

## 2020-07-06 DIAGNOSIS — I50.82 BIVENTRICULAR CONGESTIVE HEART FAILURE (HCC): ICD-10-CM

## 2020-07-06 DIAGNOSIS — K70.30 ALCOHOLIC CIRRHOSIS OF LIVER WITHOUT ASCITES (HCC): ICD-10-CM

## 2020-07-06 DIAGNOSIS — I71.21 ASCENDING AORTIC ANEURYSM (HCC): ICD-10-CM

## 2020-07-06 LAB
ANION GAP SERPL CALCULATED.3IONS-SCNC: 12.2 MMOL/L (ref 5–15)
BUN SERPL-MCNC: 18 MG/DL (ref 8–23)
BUN/CREAT SERPL: 16.2 (ref 7–25)
CALCIUM SPEC-SCNC: 10.3 MG/DL (ref 8.6–10.5)
CHLORIDE SERPL-SCNC: 94 MMOL/L (ref 98–107)
CO2 SERPL-SCNC: 31.8 MMOL/L (ref 22–29)
CREAT SERPL-MCNC: 1.11 MG/DL (ref 0.76–1.27)
DEPRECATED RDW RBC AUTO: 48 FL (ref 37–54)
ERYTHROCYTE [DISTWIDTH] IN BLOOD BY AUTOMATED COUNT: 14 % (ref 12.3–15.4)
GFR SERPL CREATININE-BSD FRML MDRD: 64 ML/MIN/1.73
GLUCOSE SERPL-MCNC: 132 MG/DL (ref 65–99)
HCT VFR BLD AUTO: 39.7 % (ref 37.5–51)
HGB BLD-MCNC: 13 G/DL (ref 13–17.7)
INR PPP: 1.69 (ref 2–3)
MCH RBC QN AUTO: 30.7 PG (ref 26.6–33)
MCHC RBC AUTO-ENTMCNC: 32.7 G/DL (ref 31.5–35.7)
MCV RBC AUTO: 93.9 FL (ref 79–97)
PLATELET # BLD AUTO: 223 10*3/MM3 (ref 140–450)
PMV BLD AUTO: 9.7 FL (ref 6–12)
POTASSIUM SERPL-SCNC: 4.2 MMOL/L (ref 3.5–5.2)
PROTHROMBIN TIME: 16.4 SECONDS (ref 19.4–28.5)
RBC # BLD AUTO: 4.23 10*6/MM3 (ref 4.14–5.8)
SODIUM SERPL-SCNC: 138 MMOL/L (ref 136–145)
WBC # BLD AUTO: 7.47 10*3/MM3 (ref 3.4–10.8)

## 2020-07-06 PROCEDURE — 85610 PROTHROMBIN TIME: CPT

## 2020-07-06 PROCEDURE — 80048 BASIC METABOLIC PNL TOTAL CA: CPT

## 2020-07-06 PROCEDURE — C9803 HOPD COVID-19 SPEC COLLECT: HCPCS | Performed by: INTERNAL MEDICINE

## 2020-07-06 PROCEDURE — 93005 ELECTROCARDIOGRAM TRACING: CPT | Performed by: INTERNAL MEDICINE

## 2020-07-06 PROCEDURE — 85027 COMPLETE CBC AUTOMATED: CPT

## 2020-07-06 PROCEDURE — 36415 COLL VENOUS BLD VENIPUNCTURE: CPT

## 2020-07-06 PROCEDURE — U0002 COVID-19 LAB TEST NON-CDC: HCPCS | Performed by: INTERNAL MEDICINE

## 2020-07-07 ENCOUNTER — ANESTHESIA EVENT (OUTPATIENT)
Dept: CARDIOLOGY | Facility: HOSPITAL | Age: 80
End: 2020-07-07

## 2020-07-07 LAB
REF LAB TEST METHOD: NORMAL
SARS-COV-2 RNA RESP QL NAA+PROBE: NOT DETECTED

## 2020-07-07 RX ORDER — SODIUM CHLORIDE 0.9 % (FLUSH) 0.9 %
10 SYRINGE (ML) INJECTION EVERY 12 HOURS SCHEDULED
Status: CANCELLED | OUTPATIENT
Start: 2020-07-07

## 2020-07-07 RX ORDER — SODIUM CHLORIDE 0.9 % (FLUSH) 0.9 %
10 SYRINGE (ML) INJECTION AS NEEDED
Status: CANCELLED | OUTPATIENT
Start: 2020-07-07

## 2020-07-07 RX ORDER — SODIUM CHLORIDE 9 MG/ML
9 INJECTION, SOLUTION INTRAVENOUS CONTINUOUS PRN
Status: CANCELLED | OUTPATIENT
Start: 2020-07-07

## 2020-07-08 ENCOUNTER — ANESTHESIA (OUTPATIENT)
Dept: CARDIOLOGY | Facility: HOSPITAL | Age: 80
End: 2020-07-08

## 2020-07-08 ENCOUNTER — HOSPITAL ENCOUNTER (INPATIENT)
Facility: HOSPITAL | Age: 80
LOS: 4 days | Discharge: HOME | End: 2020-07-12
Attending: INTERNAL MEDICINE | Admitting: HOSPITALIST

## 2020-07-08 ENCOUNTER — HOSPITAL ENCOUNTER (OUTPATIENT)
Dept: CARDIOLOGY | Facility: HOSPITAL | Age: 80
Discharge: HOME | End: 2020-07-08

## 2020-07-08 ENCOUNTER — APPOINTMENT (OUTPATIENT)
Dept: GENERAL RADIOLOGY | Facility: HOSPITAL | Age: 80
End: 2020-07-08

## 2020-07-08 DIAGNOSIS — I48.0 PAF (PAROXYSMAL ATRIAL FIBRILLATION) (HCC): ICD-10-CM

## 2020-07-08 DIAGNOSIS — I50.82 BIVENTRICULAR CONGESTIVE HEART FAILURE (HCC): ICD-10-CM

## 2020-07-08 DIAGNOSIS — I71.21 ASCENDING AORTIC ANEURYSM (HCC): ICD-10-CM

## 2020-07-08 DIAGNOSIS — I50.21 SYSTOLIC CHF, ACUTE (HCC): Primary | ICD-10-CM

## 2020-07-08 DIAGNOSIS — K70.30 ALCOHOLIC CIRRHOSIS OF LIVER WITHOUT ASCITES (HCC): ICD-10-CM

## 2020-07-08 PROBLEM — E11.9 DM (DIABETES MELLITUS) (HCC): Chronic | Status: ACTIVE | Noted: 2020-07-08

## 2020-07-08 PROBLEM — E83.42 HYPOMAGNESEMIA: Chronic | Status: ACTIVE | Noted: 2020-07-08

## 2020-07-08 PROBLEM — R14.0 ABDOMINAL DISTENTION: Status: ACTIVE | Noted: 2020-07-08

## 2020-07-08 PROBLEM — J44.9 COPD MIXED TYPE (HCC): Chronic | Status: ACTIVE | Noted: 2017-09-10

## 2020-07-08 PROBLEM — F41.9 ANXIETY DISORDER: Chronic | Status: ACTIVE | Noted: 2020-07-08

## 2020-07-08 PROBLEM — G25.81 RLS (RESTLESS LEGS SYNDROME): Chronic | Status: ACTIVE | Noted: 2020-07-08

## 2020-07-08 LAB
ALBUMIN SERPL-MCNC: 4 G/DL (ref 3.5–5.2)
ALBUMIN/GLOB SERPL: 1.3 G/DL
ALP SERPL-CCNC: 87 U/L (ref 39–117)
ALT SERPL W P-5'-P-CCNC: 11 U/L (ref 1–41)
ANION GAP SERPL CALCULATED.3IONS-SCNC: 11 MMOL/L (ref 5–15)
AST SERPL-CCNC: 23 U/L (ref 1–40)
BASOPHILS # BLD AUTO: 0.1 10*3/MM3 (ref 0–0.2)
BASOPHILS NFR BLD AUTO: 1.6 % (ref 0–1.5)
BILIRUB SERPL-MCNC: 0.9 MG/DL (ref 0–1.2)
BUN SERPL-MCNC: 19 MG/DL (ref 8–23)
BUN SERPL-MCNC: ABNORMAL MG/DL
BUN/CREAT SERPL: ABNORMAL
CALCIUM SPEC-SCNC: 9.5 MG/DL (ref 8.6–10.5)
CHLORIDE SERPL-SCNC: 95 MMOL/L (ref 98–107)
CO2 SERPL-SCNC: 33 MMOL/L (ref 22–29)
CREAT SERPL-MCNC: 1.01 MG/DL (ref 0.76–1.27)
DEPRECATED RDW RBC AUTO: 54.3 FL (ref 37–54)
EOSINOPHIL # BLD AUTO: 0.3 10*3/MM3 (ref 0–0.4)
EOSINOPHIL NFR BLD AUTO: 4.5 % (ref 0.3–6.2)
ERYTHROCYTE [DISTWIDTH] IN BLOOD BY AUTOMATED COUNT: 15.9 % (ref 12.3–15.4)
GFR SERPL CREATININE-BSD FRML MDRD: 71 ML/MIN/1.73
GLOBULIN UR ELPH-MCNC: 3.1 GM/DL
GLUCOSE BLDC GLUCOMTR-MCNC: 122 MG/DL (ref 70–105)
GLUCOSE BLDC GLUCOMTR-MCNC: 153 MG/DL (ref 70–105)
GLUCOSE BLDC GLUCOMTR-MCNC: 78 MG/DL (ref 70–105)
GLUCOSE SERPL-MCNC: 119 MG/DL (ref 65–99)
HBA1C MFR BLD: 6.8 % (ref 3.5–5.6)
HCT VFR BLD AUTO: 39.3 % (ref 37.5–51)
HGB BLD-MCNC: 12.9 G/DL (ref 13–17.7)
INR PPP: 1.3 (ref 2–3)
LYMPHOCYTES # BLD AUTO: 1.1 10*3/MM3 (ref 0.7–3.1)
LYMPHOCYTES NFR BLD AUTO: 16.6 % (ref 19.6–45.3)
MAGNESIUM SERPL-MCNC: 1.9 MG/DL (ref 1.6–2.4)
MCH RBC QN AUTO: 30.9 PG (ref 26.6–33)
MCHC RBC AUTO-ENTMCNC: 32.7 G/DL (ref 31.5–35.7)
MCV RBC AUTO: 94.3 FL (ref 79–97)
MONOCYTES # BLD AUTO: 0.5 10*3/MM3 (ref 0.1–0.9)
MONOCYTES NFR BLD AUTO: 8.2 % (ref 5–12)
NEUTROPHILS NFR BLD AUTO: 4.5 10*3/MM3 (ref 1.7–7)
NEUTROPHILS NFR BLD AUTO: 69.1 % (ref 42.7–76)
NRBC BLD AUTO-RTO: 0.2 /100 WBC (ref 0–0.2)
NT-PROBNP SERPL-MCNC: 2453 PG/ML (ref 0–1800)
PLATELET # BLD AUTO: 207 10*3/MM3 (ref 140–450)
PMV BLD AUTO: 7.1 FL (ref 6–12)
POTASSIUM SERPL-SCNC: 3.9 MMOL/L (ref 3.5–5.2)
PROT SERPL-MCNC: 7.1 G/DL (ref 6–8.5)
PROTHROMBIN TIME: 13.1 SECONDS (ref 19.4–28.5)
RBC # BLD AUTO: 4.17 10*6/MM3 (ref 4.14–5.8)
SODIUM SERPL-SCNC: 139 MMOL/L (ref 136–145)
TROPONIN T SERPL-MCNC: 0.03 NG/ML (ref 0–0.03)
WBC # BLD AUTO: 6.6 10*3/MM3 (ref 3.4–10.8)

## 2020-07-08 PROCEDURE — 82962 GLUCOSE BLOOD TEST: CPT

## 2020-07-08 PROCEDURE — 84484 ASSAY OF TROPONIN QUANT: CPT | Performed by: NURSE PRACTITIONER

## 2020-07-08 PROCEDURE — 83880 ASSAY OF NATRIURETIC PEPTIDE: CPT | Performed by: INTERNAL MEDICINE

## 2020-07-08 PROCEDURE — 99233 SBSQ HOSP IP/OBS HIGH 50: CPT | Performed by: INTERNAL MEDICINE

## 2020-07-08 PROCEDURE — 74018 RADEX ABDOMEN 1 VIEW: CPT

## 2020-07-08 PROCEDURE — 94799 UNLISTED PULMONARY SVC/PX: CPT

## 2020-07-08 PROCEDURE — 99223 1ST HOSP IP/OBS HIGH 75: CPT | Performed by: NURSE PRACTITIONER

## 2020-07-08 PROCEDURE — 25010000002 ENOXAPARIN PER 10 MG: Performed by: NURSE PRACTITIONER

## 2020-07-08 PROCEDURE — 83735 ASSAY OF MAGNESIUM: CPT | Performed by: NURSE PRACTITIONER

## 2020-07-08 PROCEDURE — 83036 HEMOGLOBIN GLYCOSYLATED A1C: CPT | Performed by: NURSE PRACTITIONER

## 2020-07-08 PROCEDURE — 82803 BLOOD GASES ANY COMBINATION: CPT

## 2020-07-08 PROCEDURE — 25010000002 FUROSEMIDE PER 20 MG: Performed by: NURSE PRACTITIONER

## 2020-07-08 PROCEDURE — 63710000001 INSULIN LISPRO (HUMAN) PER 5 UNITS: Performed by: NURSE PRACTITIONER

## 2020-07-08 PROCEDURE — 71046 X-RAY EXAM CHEST 2 VIEWS: CPT

## 2020-07-08 PROCEDURE — 93005 ELECTROCARDIOGRAM TRACING: CPT | Performed by: NURSE PRACTITIONER

## 2020-07-08 PROCEDURE — 85025 COMPLETE CBC W/AUTO DIFF WBC: CPT | Performed by: INTERNAL MEDICINE

## 2020-07-08 PROCEDURE — 36600 WITHDRAWAL OF ARTERIAL BLOOD: CPT

## 2020-07-08 PROCEDURE — 94760 N-INVAS EAR/PLS OXIMETRY 1: CPT

## 2020-07-08 PROCEDURE — 85610 PROTHROMBIN TIME: CPT | Performed by: INTERNAL MEDICINE

## 2020-07-08 PROCEDURE — 94640 AIRWAY INHALATION TREATMENT: CPT

## 2020-07-08 PROCEDURE — 80053 COMPREHEN METABOLIC PANEL: CPT | Performed by: NURSE PRACTITIONER

## 2020-07-08 RX ORDER — IPRATROPIUM BROMIDE AND ALBUTEROL SULFATE 2.5; .5 MG/3ML; MG/3ML
3 SOLUTION RESPIRATORY (INHALATION)
Status: DISCONTINUED | OUTPATIENT
Start: 2020-07-08 | End: 2020-07-12 | Stop reason: HOSPADM

## 2020-07-08 RX ORDER — SODIUM CHLORIDE 0.9 % (FLUSH) 0.9 %
10 SYRINGE (ML) INJECTION EVERY 12 HOURS SCHEDULED
Status: DISCONTINUED | OUTPATIENT
Start: 2020-07-08 | End: 2020-07-12 | Stop reason: HOSPADM

## 2020-07-08 RX ORDER — MAGNESIUM SULFATE 1 G/100ML
1 INJECTION INTRAVENOUS AS NEEDED
Status: DISCONTINUED | OUTPATIENT
Start: 2020-07-08 | End: 2020-07-12 | Stop reason: HOSPADM

## 2020-07-08 RX ORDER — SODIUM CHLORIDE 0.9 % (FLUSH) 0.9 %
10 SYRINGE (ML) INJECTION AS NEEDED
Status: DISCONTINUED | OUTPATIENT
Start: 2020-07-08 | End: 2020-07-12 | Stop reason: HOSPADM

## 2020-07-08 RX ORDER — NICOTINE POLACRILEX 4 MG
15 LOZENGE BUCCAL
Status: DISCONTINUED | OUTPATIENT
Start: 2020-07-08 | End: 2020-07-12 | Stop reason: HOSPADM

## 2020-07-08 RX ORDER — FUROSEMIDE 10 MG/ML
40 INJECTION INTRAMUSCULAR; INTRAVENOUS EVERY 12 HOURS
Status: COMPLETED | OUTPATIENT
Start: 2020-07-08 | End: 2020-07-08

## 2020-07-08 RX ORDER — IPRATROPIUM BROMIDE AND ALBUTEROL SULFATE 2.5; .5 MG/3ML; MG/3ML
3 SOLUTION RESPIRATORY (INHALATION) EVERY 4 HOURS PRN
Status: DISCONTINUED | OUTPATIENT
Start: 2020-07-08 | End: 2020-07-08 | Stop reason: SDUPTHER

## 2020-07-08 RX ORDER — ACETAMINOPHEN 160 MG/5ML
650 SOLUTION ORAL EVERY 4 HOURS PRN
Status: DISCONTINUED | OUTPATIENT
Start: 2020-07-08 | End: 2020-07-12 | Stop reason: HOSPADM

## 2020-07-08 RX ORDER — ONDANSETRON 2 MG/ML
4 INJECTION INTRAMUSCULAR; INTRAVENOUS EVERY 6 HOURS PRN
Status: DISCONTINUED | OUTPATIENT
Start: 2020-07-08 | End: 2020-07-11 | Stop reason: SDUPTHER

## 2020-07-08 RX ORDER — DIGOXIN 125 MCG
125 TABLET ORAL EVERY MORNING
Status: DISCONTINUED | OUTPATIENT
Start: 2020-07-08 | End: 2020-07-12 | Stop reason: HOSPADM

## 2020-07-08 RX ORDER — IPRATROPIUM BROMIDE AND ALBUTEROL SULFATE 2.5; .5 MG/3ML; MG/3ML
3 SOLUTION RESPIRATORY (INHALATION) EVERY 4 HOURS PRN
Status: DISCONTINUED | OUTPATIENT
Start: 2020-07-08 | End: 2020-07-12 | Stop reason: HOSPADM

## 2020-07-08 RX ORDER — POTASSIUM CHLORIDE 20 MEQ/1
40 TABLET, EXTENDED RELEASE ORAL AS NEEDED
Status: DISCONTINUED | OUTPATIENT
Start: 2020-07-08 | End: 2020-07-12 | Stop reason: HOSPADM

## 2020-07-08 RX ORDER — ACETAMINOPHEN 650 MG/1
650 SUPPOSITORY RECTAL EVERY 4 HOURS PRN
Status: DISCONTINUED | OUTPATIENT
Start: 2020-07-08 | End: 2020-07-12 | Stop reason: HOSPADM

## 2020-07-08 RX ORDER — DEXTROSE MONOHYDRATE 25 G/50ML
25 INJECTION, SOLUTION INTRAVENOUS
Status: DISCONTINUED | OUTPATIENT
Start: 2020-07-08 | End: 2020-07-12 | Stop reason: HOSPADM

## 2020-07-08 RX ORDER — ONDANSETRON 4 MG/1
4 TABLET, FILM COATED ORAL EVERY 6 HOURS PRN
Status: DISCONTINUED | OUTPATIENT
Start: 2020-07-08 | End: 2020-07-12 | Stop reason: HOSPADM

## 2020-07-08 RX ORDER — ACETAMINOPHEN 325 MG/1
650 TABLET ORAL EVERY 4 HOURS PRN
Status: DISCONTINUED | OUTPATIENT
Start: 2020-07-08 | End: 2020-07-10 | Stop reason: SDUPTHER

## 2020-07-08 RX ORDER — MAGNESIUM SULFATE HEPTAHYDRATE 40 MG/ML
2 INJECTION, SOLUTION INTRAVENOUS AS NEEDED
Status: DISCONTINUED | OUTPATIENT
Start: 2020-07-08 | End: 2020-07-12 | Stop reason: HOSPADM

## 2020-07-08 RX ORDER — ALPRAZOLAM 0.5 MG/1
0.5 TABLET ORAL NIGHTLY PRN
Status: DISCONTINUED | OUTPATIENT
Start: 2020-07-08 | End: 2020-07-12 | Stop reason: HOSPADM

## 2020-07-08 RX ORDER — BUDESONIDE 0.5 MG/2ML
0.5 INHALANT ORAL
Status: DISCONTINUED | OUTPATIENT
Start: 2020-07-08 | End: 2020-07-12 | Stop reason: HOSPADM

## 2020-07-08 RX ORDER — ROPINIROLE 0.25 MG/1
0.5 TABLET, FILM COATED ORAL 2 TIMES DAILY
Status: DISCONTINUED | OUTPATIENT
Start: 2020-07-08 | End: 2020-07-09

## 2020-07-08 RX ADMIN — Medication 10 ML: at 20:22

## 2020-07-08 RX ADMIN — FUROSEMIDE 40 MG: 10 INJECTION, SOLUTION INTRAMUSCULAR; INTRAVENOUS at 17:12

## 2020-07-08 RX ADMIN — ALPRAZOLAM 0.5 MG: 0.5 TABLET ORAL at 20:22

## 2020-07-08 RX ADMIN — ROPINIROLE 0.5 MG: 0.25 TABLET, FILM COATED ORAL at 20:22

## 2020-07-08 RX ADMIN — IPRATROPIUM BROMIDE AND ALBUTEROL SULFATE 3 ML: .5; 3 SOLUTION RESPIRATORY (INHALATION) at 09:28

## 2020-07-08 RX ADMIN — BUDESONIDE 0.5 MG: 0.5 INHALANT RESPIRATORY (INHALATION) at 18:21

## 2020-07-08 RX ADMIN — BUDESONIDE 0.5 MG: 0.5 INHALANT RESPIRATORY (INHALATION) at 11:16

## 2020-07-08 RX ADMIN — FUROSEMIDE 40 MG: 10 INJECTION, SOLUTION INTRAMUSCULAR; INTRAVENOUS at 11:43

## 2020-07-08 RX ADMIN — ENOXAPARIN SODIUM 40 MG: 40 INJECTION SUBCUTANEOUS at 11:20

## 2020-07-08 RX ADMIN — INSULIN LISPRO 2 UNITS: 100 INJECTION, SOLUTION INTRAVENOUS; SUBCUTANEOUS at 17:12

## 2020-07-08 RX ADMIN — IPRATROPIUM BROMIDE AND ALBUTEROL SULFATE 3 ML: 2.5; .5 SOLUTION RESPIRATORY (INHALATION) at 18:21

## 2020-07-08 RX ADMIN — ROPINIROLE 0.5 MG: 0.25 TABLET, FILM COATED ORAL at 12:04

## 2020-07-08 RX ADMIN — DILTIAZEM HYDROCHLORIDE 15 MG: 30 TABLET ORAL at 20:22

## 2020-07-08 RX ADMIN — IPRATROPIUM BROMIDE AND ALBUTEROL SULFATE 3 ML: 2.5; .5 SOLUTION RESPIRATORY (INHALATION) at 11:16

## 2020-07-08 NOTE — PLAN OF CARE
Pt sitting up in chair. No signs of distress. Urinal at bedside. No complaints voiced at this time.

## 2020-07-08 NOTE — PROGRESS NOTES
Reason for follow-up: Severe cardiomyopathy  Acute congestive heart failure   Atrial fibrillation  Ascending aortic aneurysm     Patient Care Team:  Valeriano Lewis MD as PCP - General  Qamar Lau MD (Cardiology)    Subjective .   Problems with shortness of breath lying flat     Review of Systems   Constitution: Positive for malaise/fatigue. Negative for fever.   HENT: Negative for congestion and hearing loss.    Eyes: Negative for double vision and visual disturbance.   Cardiovascular: Positive for dyspnea on exertion, orthopnea and paroxysmal nocturnal dyspnea. Negative for chest pain, claudication, leg swelling and syncope.   Respiratory: Positive for shortness of breath. Negative for cough.    Endocrine: Negative for cold intolerance.   Skin: Negative for color change and rash.   Musculoskeletal: Negative for arthritis and joint pain.   Gastrointestinal: Negative for abdominal pain and heartburn.   Genitourinary: Negative for hematuria.   Neurological: Negative for excessive daytime sleepiness and dizziness.   Psychiatric/Behavioral: Negative for depression. The patient is not nervous/anxious.    All other systems reviewed and are negative.      Gabapentin    Scheduled Meds:  budesonide 0.5 mg Nebulization BID - RT   digoxin 125 mcg Oral QAM   dilTIAZem 15 mg Oral BID   enoxaparin 40 mg Subcutaneous Q24H   insulin lispro 0-9 Units Subcutaneous TID AC   ipratropium-albuterol 3 mL Nebulization 4x Daily - RT   rOPINIRole 0.5 mg Oral BID   sodium chloride 10 mL Intravenous Q12H     Continuous Infusions:   PRN Meds:.•  acetaminophen **OR** acetaminophen **OR** acetaminophen  •  ALPRAZolam  •  dextrose  •  dextrose  •  glucagon (human recombinant)  •  insulin lispro **AND** insulin lispro  •  ipratropium-albuterol  •  magnesium sulfate **OR** magnesium sulfate in D5W 1g/100mL (PREMIX)  •  ondansetron **OR** ondansetron  •  potassium chloride  •  sodium chloride    Objective   Looks in mild  "distress    VITAL SIGNS  Vitals:    07/08/20 1426 07/08/20 1634 07/08/20 1803 07/08/20 1805   BP:    128/50   BP Location:    Right arm   Patient Position:    Sitting   Pulse: 86 73 87 79   Resp:    22   Temp:    96.3 °F (35.7 °C)   TempSrc:    Axillary   SpO2: 97%   96%   Weight:       Height:           Flowsheet Rows      First Filed Value   Admission Height  163.8 cm (64.5\") Documented at 07/08/2020 0825   Admission Weight  76.1 kg (167 lb 12.3 oz) Documented at 07/08/2020 0825           TELEMETRY: Atrial fibrillation    Physical Exam:  Physical Exam   Constitutional: He is oriented to person, place, and time. He appears well-developed and well-nourished. He is cooperative.   HENT:   Head: Normocephalic and atraumatic.   Mouth/Throat: Uvula is midline and oropharynx is clear and moist. No oral lesions.   Eyes: Conjunctivae are normal. No scleral icterus.   Neck: Trachea normal. Neck supple. No JVD present. Carotid bruit is not present. No thyromegaly present.   Cardiovascular: Normal rate, S1 normal, S2 normal, normal heart sounds, intact distal pulses and normal pulses. An irregularly irregular rhythm present. PMI is not displaced. Exam reveals no gallop and no friction rub.   No murmur heard.  Pulmonary/Chest: Effort normal. He has rales.   Abdominal: Soft. Bowel sounds are normal.   Musculoskeletal: Normal range of motion.   Neurological: He is alert and oriented to person, place, and time. He has normal strength.   No focal deficits   Skin: Skin is warm. No cyanosis.   Psychiatric: He has a normal mood and affect.                LAB RESULTS (LAST 7 DAYS)    CBC  Results from last 7 days   Lab Units 07/08/20  1142 07/06/20  1023   WBC 10*3/mm3 6.60 7.47   RBC 10*6/mm3 4.17 4.23   HEMOGLOBIN g/dL 12.9* 13.0   HEMATOCRIT % 39.3 39.7   MCV fL 94.3 93.9   PLATELETS 10*3/mm3 207 223       BMP  Results from last 7 days   Lab Units 07/08/20  1142 07/08/20  1018 07/06/20  1023   SODIUM mmol/L  --  139 138   POTASSIUM " mmol/L  --  3.9 4.2   CHLORIDE mmol/L  --  95* 94*   CO2 mmol/L  --  33.0* 31.8*   BUN   --  19 18   CREATININE mg/dL  --  1.01 1.11   GLUCOSE mg/dL  --  119* 132*   MAGNESIUM mg/dL 1.9  --   --        CMP Results from last 7 days   Lab Units 07/08/20  1018 07/06/20  1023   SODIUM mmol/L 139 138   POTASSIUM mmol/L 3.9 4.2   CHLORIDE mmol/L 95* 94*   CO2 mmol/L 33.0* 31.8*   BUN  19 18   CREATININE mg/dL 1.01 1.11   GLUCOSE mg/dL 119* 132*   ALBUMIN g/dL 4.00  --    BILIRUBIN mg/dL 0.9  --    ALK PHOS U/L 87  --    AST (SGOT) U/L 23  --    ALT (SGPT) U/L 11  --          BNP        TROPONIN  Results from last 7 days   Lab Units 07/08/20  1529   TROPONIN T ng/mL 0.032*       CoAg  Results from last 7 days   Lab Units 07/08/20  1142 07/06/20  1023   INR  1.30* 1.69*       Creatinine Clearance  Estimated Creatinine Clearance: 56.9 mL/min (by C-G formula based on SCr of 1.01 mg/dL).    ABG        Radiology  Xr Abdomen Kub    Result Date: 7/8/2020  Unremarkable bowel gas pattern.  Electronically Signed By-Tim Baltazar On:7/8/2020 1:33 PM This report was finalized on [id number] by  Tim Baltazar .    Xr Chest Pa & Lateral    Result Date: 7/8/2020  1. Chronic left lower lobe bronchial wall thickening with interstitial opacity which may represent infection and bronchitis/bronchiolitis. 2. Chronic blunting of the right costophrenic angle likely related to a trace effusion or scarring. 3. Upper lobe predominant emphysema with biapical pleural-parenchymal scarring.  Electronically Signed BySharri Bruner On:7/8/2020 1:35 PM This report was finalized on [id number] by  Elbert Bruner .            EKG        I personally viewed and interpreted the patient's EKG/Telemetry data:    ECHOCARDIOGRAM:    Results for orders placed during the hospital encounter of 06/09/20   Adult Transthoracic Echo Complete W/ Cont if Necessary Per Protocol    Narrative · Estimated EF = 35%.  · Right ventricular cavity is severely dilated.  ·  Moderately reduced right ventricular systolic function noted.  · Left atrial cavity size is moderate-to-severely dilated.  · Mild mitral valve regurgitation is present  · Severe tricuspid valve regurgitation is present.  · The following left ventricular wall segments are hypokinetic: mid   anterior, apical anterior, basal anterolateral, mid anterolateral, apical   lateral, basal inferolateral, mid inferolateral, apical inferior, mid   inferior, apical septal, basal inferoseptal, mid inferoseptal, apex   hypokinetic, mid anteroseptal, basal anterior, basal inferior and basal   inferoseptal.        STRESS MYOVIEW:    CARDIAC CATHETERIZATION:    OTHER:         Assessment/Plan       Systolic CHF, acute (CMS/HCC)    Biventricular congestive heart failure (CMS/HCC)    Ascending aortic aneurysm (CMS/HCC)    PAF (paroxysmal atrial fibrillation) (CMS/HCC)    COPD mixed type (CMS/HCC)    Alcoholic cirrhosis of liver without ascites (CMS/HCC)    RLS (restless legs syndrome)    DM (diabetes mellitus) (CMS/HCC)    Anxiety disorder    Hypomagnesemia    Abdominal distention      Most probably acute systolic congestive heart failure  Worsened with diuresis  May need pulmonary evaluation  Patient was scheduled for right and left cardiac cath and OPAL will hold off at this point until he is more stable  Ascending aortic aneurysm followed by CV surgery  Aggressive control of diabetes  Resume anticoagulation with Lovenox    I discussed the patients findings and my recommendations with patient and attending nurse    Qamar Lau MD  07/08/20  18:24

## 2020-07-08 NOTE — H&P
[facility] Medicine [facility]      Patient Name: Giles Frost  : 1940  MRN: [id number]  Primary Care Physician: Valeriano Lewis MD  Date of admission: 2020    Patient Care Team:  Valeriano Lewis MD as PCP - Qamar Grey MD (Cardiology)          Subjective   History Present Illness     Chief Complaint: Shortness of breath     Mr. Frost is a 79 y.o. male with past medical history of paroxysmal atrial fibrillation, congestive heart failure, AAA, alcoholic cirrhosis, diabetes mellitus, COPD, restless leg syndrome, and anxiety who presented to [facility] on 2020 to undergo a heart catheterization.  Upon assessment Dr. Gramajo realized patient had increased shortness of breath and admitted the patient to the hospitalist for further care and management. Heart catherization pushed back until Friday. Patient states he has had increased shortness of breath for the last 3 weeks to the point that he wears oxygen during the day now.  Normally he only wears oxygen at night.  He also noticed his bilateral lower extremities are swelling, and stated this is never happened before.  Humidity makes his shortness of breath worse.  Being in the air conditioning helps her shortness of breath.  He denies any recent nausea, vomiting, diarrhea, fever, chills, cough, or chest pain.  Patient had mention he felt like his shortness of breath started to get worse after his aneurysm surgery.             Review of Systems   Constitution: Negative.   HENT: Negative.    Cardiovascular: Positive for leg swelling.   Respiratory: Positive for shortness of breath.    Skin: Negative.    Musculoskeletal: Negative.    Gastrointestinal: Negative.    Genitourinary: Negative.    Neurological: Negative.            Personal History     Past Medical History:   Past Medical History:   Diagnosis Date   • AAA (abdominal aortic aneurysm) (CMS/[facility])    • Alcohol abuse     Hx   • Alcoholic  cirrhosis (CMS/HCC)    • Ankle edema, bilateral     at times   • Anxiety     Welbutrin   • Anxiety    • Aortic aneurysm (CMS/HCC) 09/19/2018    Ascending Measuring 4.7CM Noted on CT Chest   • Atrial fibrillation (CMS/HCC)    • Cardiomegaly 09/19/2018    CT Chest   • Centrilobular emphysema (CMS/HCC) 09/19/2018    On CT Chest   • CHF (congestive heart failure) (CMS/HCC)    • Cholecystolithiasis 09/19/2018    On CT Chest   • Chronic liver disease    • COPD (chronic obstructive pulmonary disease) (CMS/HCC)    • Depression    • Descending thoracic aortic aneurysm (CMS/HCC) 09/19/2018    On Chest CT-4.2CM Near Diaphragmatic Hiatus and 4.8CM at Main Pulmonary Artery   • Diabetes mellitus (CMS/HCC)    • Diarrhea    • History of echocardiogram 12/20/16 & 8/2/17- [facility]   • History of EKG 12/2016    Atrial Fib   • Hx of chest x-ray 12/18/2016    1 V   • Hx of chest x-ray 08/2011   • Hx of CT scan of chest 09/19/18-[facility]    Enlarged Heart Size; Coronary Artery and Aortic Atherosclerotic Calcifications; Ascending Aorta Measuring 4.7CM;    • Long term current use of anticoagulant    • On home oxygen therapy     2 LPM QHS   • Paraseptal emphysema (CMS/HCC) 09/19/2018    On CT Chest   • Pleural nodule 09/19/2018    On CT Chest-6MM   • Pneumonia 06/2019    Hx   • Pulmonary hypertension (CMS/HCC)    • Pulmonary nodules 09/19/2018    R on CT Chest   • Respiratory failure (CMS/HCC) Hypoxic   • Sleep apnea     uses only O2 QHS       Surgical History:      Past Surgical History:   Procedure Laterality Date   • ABDOMINAL AORTIC ANEURYSM REPAIR WITH ENDOGRAFT N/A 9/23/2019    Procedure: ENDOVASCULAR AORTIC ANEURYSM REPAIR WITH GORE (EVAR);  Surgeon: Dewayne Victoria MD;  Location: [facility] OR;  Service: Vascular   • BRONCHOSCOPY  1/12/17-[facility]    Dr. Moreno   • CATARACT EXTRACTION, BILATERAL     • COLONOSCOPY  08/23/2011    w/EGD-Dr. Tse   • COLONOSCOPY  10/2006    w/EGD-Diony Moy MD   • TONSILLECTOMY             Family  History: family history includes Diabetes in his sister. Otherwise pertinent FHx was reviewed and unremarkable.     Social History:  reports that he has quit smoking. His smoking use included cigarettes. He quit after 2.00 years of use. He has never used smokeless tobacco. He reports that he does not drink alcohol or use drugs.      Medications:  Prior to Admission medications    Medication Sig Start Date End Date Taking? Authorizing Provider   ALPRAZolam (XANAX) 0.25 MG tablet Take 2 tablets by mouth every night at bedtime. 8/5/19  Yes Raj Amezquita MD   beclomethasone (QVAR) 40 MCG/ACT inhaler Inhale 2 puffs 2 (Two) Times a Day. 5/27/13  Yes Raj Amezquita MD   Dextran 70-Hypromellose (ARTIFICIAL TEARS) 0.1-0.3 % solution Apply 2 drops to eye(s) as directed by provider 2 (Two) Times a Day As Needed.   Yes Raj Amezquita MD   digoxin (LANOXIN) 125 MCG tablet Take 125 mcg by mouth Every Morning. Take day of surgery 1/27/17  Yes Raj Amezquita MD   diltiaZEM (CARDIZEM) 30 MG tablet Take 15 mg by mouth 2 (two) times a day. Take day of surgery 7/28/17  Yes Raj Amezquita MD   furosemide (LASIX) 40 MG tablet Take 80 mg by mouth Daily With Breakfast. Do not take day of surgery 5/27/13  Yes Raj Amezquita MD   Liniments (BLUE-EMU SUPER STRENGTH) cream Apply  topically.   Yes Raj Amezquita MD   magnesium oxide (MAGOX) 400 (241.3 Mg) MG tablet tablet Take 400 mg by mouth Daily As Needed.   Yes Raj Amezquita MD   metFORMIN (FORTAMET) 500 MG (OSM) 24 hr tablet Take 1,000 mg by mouth every night at bedtime. Do not take 48 hours prior to surgery 7/28/17  Yes Raj Amezquita MD   metFORMIN ER (GLUCOPHAGE-XR) 500 MG 24 hr tablet Take 500 mg by mouth Every Morning. Do not take 48 hours prior to surgery.  Last dose to be 09/21 before 0800 5/6/19  Yes Raj Amezquita MD   potassium chloride (KLOR-CON) 20 MEQ packet Take 20 mEq by mouth Daily. Take day of surgery  5/18/18  Yes Raj Amezquita MD   rOPINIRole (REQUIP) 0.5 MG tablet Take 0.5 mg by mouth 2 (Two) Times a Day. 5/10/19  Yes Raj Amezquita MD   acetaminophen (TYLENOL) 325 MG tablet Take 650 mg by mouth Every 6 (Six) Hours As Needed for Mild Pain .    Raj Amezquita MD   ipratropium-albuterol (COMBIVENT RESPIMAT)  MCG/ACT inhaler Inhale 1 puff 4 (Four) Times a Day As Needed for Wheezing.    Raj Amezquita MD   ipratropium-albuterol (DUO-NEB) 0.5-2.5 mg/3 ml nebulizer Take 3 mL by nebulization 4 (Four) Times a Day.    Raj Amezquita MD   warfarin (COUMADIN) 1.5 MG half tablet Take 1.5 mg by mouth Daily. Mondays, Wednesdays and Fridays    Raj Amezquita MD   warfarin (COUMADIN) 3 MG tablet Take 3 mg by mouth Daily. Take 3 mg on Tuesdays, Thursdays, Saturdays and Sundays    Raj Amezquita MD       Allergies:    Allergies   Allergen Reactions   • Gabapentin Other (See Comments) and Confusion     Vision loss         Objective   Objective     Vital Signs  Temp:  [97.4 °F (36.3 °C)] 97.4 °F (36.3 °C)  Heart Rate:  [54-65] 54  Resp:  [20-26] 21  BP: (147)/(77) 147/77  SpO2:  [100 %] 100 %  on  Flow (L/min):  [2] 2;   Device (Oxygen Therapy): nasal cannula  Body mass index is 28.35 kg/m².    Physical Exam   Constitutional: He is oriented to person, place, and time. He appears well-developed and well-nourished.   HENT:   Head: Normocephalic and atraumatic.   Right Ear: External ear normal.   Left Ear: External ear normal.   Nose: Nose normal.   Mouth/Throat: Oropharynx is clear and moist.   Eyes: Pupils are equal, round, and reactive to light. Conjunctivae and EOM are normal.   Neck: Normal range of motion.   Cardiovascular: Normal rate and normal heart sounds.   S1, S2 audible- irregular heart beat noted    Pulmonary/Chest: Breath sounds normal.   Slightly short of breath, currently on 2 L nasal cannula of oxygen and does not wear this chronically but has for the last 3  weeks   Abdominal: Soft. He exhibits distension.   Musculoskeletal: Normal range of motion. He exhibits edema.   +1 bilateral lower extremity pitting edema   Neurological: He is alert and oriented to person, place, and time.   Skin: Skin is warm and dry. There is erythema.   Slight erythema noted bilateral lower extremities   Psychiatric: He has a normal mood and affect. His behavior is normal. Judgment and thought content normal.   Vitals reviewed.      Results Review:  I have personally reviewed most recent cardiac tracings, lab results and radiology images and interpretations and agree with findings.    Results from last 7 days   Lab Units 07/06/20  1023   WBC 10*3/mm3 7.47   HEMOGLOBIN g/dL 13.0   HEMATOCRIT % 39.7   PLATELETS 10*3/mm3 223   INR  1.69*     Results from last 7 days   Lab Units 07/08/20  1018 07/06/20  1023   SODIUM mmol/L 139 138   POTASSIUM mmol/L 3.9 4.2   CHLORIDE mmol/L 95* 94*   CO2 mmol/L 33.0* 31.8*   BUN mg/dL  --  18   CREATININE mg/dL 1.01 1.11   GLUCOSE mg/dL 119* 132*   CALCIUM mg/dL 9.5 10.3   ALT (SGPT) U/L 11  --    AST (SGOT) U/L 23  --    TROPONIN T ng/mL 0.029  --    PROBNP pg/mL 2,453.0*  --      Estimated Creatinine Clearance: 56.9 mL/min (by C-G formula based on SCr of 1.01 mg/dL).  Brief Urine Lab Results     None          Microbiology Results (last 10 days)     Procedure Component Value - Date/Time    COVID PRE-OP / PRE-PROCEDURE SCREENING ORDER (NO ISOLATION) - Swab, Nasopharynx [[id number]] Collected:  07/06/20 1023    Lab Status:  Final result Specimen:  Swab from Nasopharynx Updated:  07/07/20 1551    Narrative:       The following orders were created for panel order COVID PRE-OP / PRE-PROCEDURE SCREENING ORDER (NO ISOLATION) - Swab, Nasopharynx.  Procedure                               Abnormality         Status                     ---------                               -----------         ------                     COVID-19,[facility], NP ...[[id number]]                       Final result                 Please view results for these tests on the individual orders.    COVID-19,[facility], NP SWAB IN LEXAR SALINE MEDIA 24-30 HR TAT - Swab, Nasopharynx [[id number]] Collected:  07/06/20 1023    Lab Status:  Final result Specimen:  Swab from Nasopharynx Updated:  07/07/20 1551     Reference Lab Report --     COVID19 Not Detected          ECG/EMG Results (most recent)     Procedure Component Value Units Date/Time    ECG 12 Lead [[id number]] Collected:  07/08/20 1026     Updated:  07/08/20 1028    Narrative:       HEART RATE= 59  bpm  RR Interval= 1025  ms  OK Interval=   ms  P Horizontal Axis=   deg  P Front Axis=   deg  QRSD Interval= 106  ms  QT Interval= 424  ms  QRS Axis= 85  deg  T Wave Axis= -2  deg  - ABNORMAL ECG -  Atrial fibrillation  Low voltage, extremity leads  Electronically Signed By:   Date and Time of Study: 2020-07-08 10:26:30               Results for orders placed during the hospital encounter of 06/09/20   Adult Transthoracic Echo Complete W/ Cont if Necessary Per Protocol    Narrative · Estimated EF = 35%.  · Right ventricular cavity is severely dilated.  · Moderately reduced right ventricular systolic function noted.  · Left atrial cavity size is moderate-to-severely dilated.  · Mild mitral valve regurgitation is present  · Severe tricuspid valve regurgitation is present.  · The following left ventricular wall segments are hypokinetic: mid   anterior, apical anterior, basal anterolateral, mid anterolateral, apical   lateral, basal inferolateral, mid inferolateral, apical inferior, mid   inferior, apical septal, basal inferoseptal, mid inferoseptal, apex   hypokinetic, mid anteroseptal, basal anterior, basal inferior and basal   inferoseptal.          No radiology results for the last 7 days      Estimated Creatinine Clearance: 56.9 mL/min (by C-G formula based on SCr of 1.01 mg/dL).    Assessment/Plan   Assessment/Plan       Active Hospital Problems    Diagnosis   POA   • **Systolic CHF, acute (CMS/HCC) [I50.21]  Yes     Priority: High   • Abdominal distention [R14.0]  Yes     Priority: Medium   • RLS (restless legs syndrome) [G25.81]  Yes   • DM (diabetes mellitus) (CMS/HCC) [E11.9]  Yes   • Anxiety disorder [F41.9]  Yes   • Hypomagnesemia [E83.42]  Yes   • Biventricular congestive heart failure (CMS/HCC) [I50.82]  Yes   • Alcoholic cirrhosis of liver without ascites (CMS/HCC) [K70.30]  Unknown   • Ascending aortic aneurysm (CMS/HCC) [I71.2]  Unknown   • PAF (paroxysmal atrial fibrillation) (CMS/HCC) [I48.0]  No   • COPD mixed type (CMS/HCC) [J44.9]  Yes      Resolved Hospital Problems   No resolved problems to display.     Shortness of breath secondary to Acute biventricular systolic CHF exacerbation  - Other etiologies cannot be excluded at this time   - Last 2D echo on 6/9/2020 showed Estimated EF = 35%.  - COVID test on 7/6/2020-  - Check chest x-ray, ABG, CBC, CMP, troponin, and EKG  - OPAL pending  - pro BNP 2453- monitor   - Holding home lasix at this time  - Lasix 40 mg IV X2 doses ordered for now   - Cardiology consulted- plan for cath on Friday     Abdominal distention  -Likely a combination of CHF and cirrhosis of the liver  -KUB abdomen ordered    Alcoholic cirrhosis of the liver  - Check CMP  - Follow-up with GI outpatient     AAA S/P endovascular abdominal aortic repair on 9/23/2019   - Follow up with Vascular outpatient     Paroxysmal atrial fibrillation  -Holding home Coumadin, Planned for Friday-Lovenox ordered  -Continue diltiazem and digoxin  -Continuous cardiac monitoring  -Check EKG, irregular heartbeat noted on exam     Diabetes mellitus type 2  -Start sliding scale, Accu-Cheks  -Hemoglobin A1c  -On home metformin    COPD  -Not in exacerbation  -Patient currently on 2 L nasal cannula of oxygen and does not usually wear this during the day at home but has for the last 3 weeks-likely due to CHF  -Continue home breathing  treatments    Anxiety  -Stable  -Continue Xanax (INSPECT verified)     Restless leg syndrome  -Continue Requip    Chronic hypomagnesemia  -Check mag  -On mag ox at home, electrolyte protocol ordered for now      VTE Prophylaxis - Lovenox      CODE STATUS:    Code Status and Medical Interventions:   Ordered at: 07/08/20 1046     Level Of Support Discussed With:    Patient     Code Status:    CPR     Medical Interventions (Level of Support Prior to Arrest):    Full       This patient has been examined wearing appropriate Personal Protective Equipment . 07/08/20      I discussed the patient's findings and my recommendations with patient and nursing staff.        Electronically signed by MELL Varma, 07/08/20, 9:42 AM.  [facility] Hospitalist Team

## 2020-07-08 NOTE — ANESTHESIA PREPROCEDURE EVALUATION
Anesthesia Evaluation     Patient summary reviewed and Nursing notes reviewed   NPO Solid Status: > 6 hours  NPO Liquid Status: > 6 hours           Airway   Mallampati: II  TM distance: >3 FB  Neck ROM: full  No difficulty expected  Dental - normal exam     Pulmonary - normal exam    breath sounds clear to auscultation  (+) pneumonia , COPD, sleep apnea,   Cardiovascular     ECG reviewed  Rhythm: irregular  Rate: normal    (+) dysrhythmias, CHF ,       Neuro/Psych  (+) psychiatric history,     GI/Hepatic/Renal/Endo    (+)   liver disease, diabetes mellitus,     Musculoskeletal (-) negative ROS    Abdominal  - normal exam    Abdomen: soft.  Bowel sounds: normal.   Substance History   (+) alcohol use,      OB/GYN negative ob/gyn ROS         Other - negative ROS                     Anesthesia Plan    ASA 3     MAC     intravenous induction     Anesthetic plan, all risks, benefits, and alternatives have been provided, discussed and informed consent has been obtained with: patient.  Use of blood products discussed with patient .

## 2020-07-09 LAB
ALBUMIN SERPL-MCNC: 4.3 G/DL (ref 3.5–5.2)
ALBUMIN/GLOB SERPL: 1.3 G/DL
ALP SERPL-CCNC: 92 U/L (ref 39–117)
ALT SERPL W P-5'-P-CCNC: 10 U/L (ref 1–41)
ANION GAP SERPL CALCULATED.3IONS-SCNC: 11 MMOL/L (ref 5–15)
ARTERIAL PATENCY WRIST A: POSITIVE
AST SERPL-CCNC: 20 U/L (ref 1–40)
ATMOSPHERIC PRESS: ABNORMAL MM[HG]
BASE EXCESS BLDA CALC-SCNC: 5.9 MMOL/L (ref 0–3)
BASOPHILS # BLD AUTO: 0.1 10*3/MM3 (ref 0–0.2)
BASOPHILS NFR BLD AUTO: 0.9 % (ref 0–1.5)
BDY SITE: ABNORMAL
BILIRUB SERPL-MCNC: 0.9 MG/DL (ref 0–1.2)
BUN SERPL-MCNC: 17 MG/DL (ref 8–23)
BUN SERPL-MCNC: ABNORMAL MG/DL
BUN/CREAT SERPL: ABNORMAL
CALCIUM SPEC-SCNC: 9.7 MG/DL (ref 8.6–10.5)
CHLORIDE SERPL-SCNC: 94 MMOL/L (ref 98–107)
CO2 BLDA-SCNC: 35 MMOL/L (ref 22–29)
CO2 SERPL-SCNC: 37 MMOL/L (ref 22–29)
CREAT SERPL-MCNC: 1.05 MG/DL (ref 0.76–1.27)
DEPRECATED RDW RBC AUTO: 53.8 FL (ref 37–54)
EOSINOPHIL # BLD AUTO: 0.4 10*3/MM3 (ref 0–0.4)
EOSINOPHIL NFR BLD AUTO: 5.7 % (ref 0.3–6.2)
ERYTHROCYTE [DISTWIDTH] IN BLOOD BY AUTOMATED COUNT: 15.9 % (ref 12.3–15.4)
GFR SERPL CREATININE-BSD FRML MDRD: 68 ML/MIN/1.73
GLOBULIN UR ELPH-MCNC: 3.4 GM/DL
GLUCOSE BLDC GLUCOMTR-MCNC: 111 MG/DL (ref 70–105)
GLUCOSE BLDC GLUCOMTR-MCNC: 113 MG/DL (ref 70–105)
GLUCOSE BLDC GLUCOMTR-MCNC: 120 MG/DL (ref 70–105)
GLUCOSE BLDC GLUCOMTR-MCNC: 160 MG/DL (ref 70–105)
GLUCOSE SERPL-MCNC: 110 MG/DL (ref 65–99)
HCO3 BLDA-SCNC: 33.2 MMOL/L (ref 21–28)
HCT VFR BLD AUTO: 40.2 % (ref 37.5–51)
HEMODILUTION: NO
HGB BLD-MCNC: 13.2 G/DL (ref 13–17.7)
INHALED O2 CONCENTRATION: 28 %
INR PPP: 1.3 (ref 2–3)
LYMPHOCYTES # BLD AUTO: 1.3 10*3/MM3 (ref 0.7–3.1)
LYMPHOCYTES NFR BLD AUTO: 18.5 % (ref 19.6–45.3)
MCH RBC QN AUTO: 30.8 PG (ref 26.6–33)
MCHC RBC AUTO-ENTMCNC: 32.8 G/DL (ref 31.5–35.7)
MCV RBC AUTO: 93.8 FL (ref 79–97)
MODALITY: ABNORMAL
MONOCYTES # BLD AUTO: 0.7 10*3/MM3 (ref 0.1–0.9)
MONOCYTES NFR BLD AUTO: 9.7 % (ref 5–12)
NEUTROPHILS NFR BLD AUTO: 4.5 10*3/MM3 (ref 1.7–7)
NEUTROPHILS NFR BLD AUTO: 65.2 % (ref 42.7–76)
NRBC BLD AUTO-RTO: 0.1 /100 WBC (ref 0–0.2)
NT-PROBNP SERPL-MCNC: 2587 PG/ML (ref 0–1800)
PCO2 BLDA: 58.7 MM HG (ref 35–48)
PH BLDA: 7.36 PH UNITS (ref 7.35–7.45)
PLATELET # BLD AUTO: 218 10*3/MM3 (ref 140–450)
PMV BLD AUTO: 7.1 FL (ref 6–12)
PO2 BLDA: 83.8 MM HG (ref 83–108)
POTASSIUM SERPL-SCNC: 3.7 MMOL/L (ref 3.5–5.2)
PROT SERPL-MCNC: 7.7 G/DL (ref 6–8.5)
PROTHROMBIN TIME: 12.8 SECONDS (ref 19.4–28.5)
RBC # BLD AUTO: 4.29 10*6/MM3 (ref 4.14–5.8)
SAO2 % BLDCOA: 95.5 % (ref 94–98)
SODIUM SERPL-SCNC: 142 MMOL/L (ref 136–145)
TOTAL RATE: 21 BREATHS/MINUTE
WBC # BLD AUTO: 7 10*3/MM3 (ref 3.4–10.8)

## 2020-07-09 PROCEDURE — 82962 GLUCOSE BLOOD TEST: CPT

## 2020-07-09 PROCEDURE — 25010000002 ENOXAPARIN PER 10 MG: Performed by: INTERNAL MEDICINE

## 2020-07-09 PROCEDURE — 99232 SBSQ HOSP IP/OBS MODERATE 35: CPT | Performed by: HOSPITALIST

## 2020-07-09 PROCEDURE — 85025 COMPLETE CBC W/AUTO DIFF WBC: CPT | Performed by: NURSE PRACTITIONER

## 2020-07-09 PROCEDURE — 94799 UNLISTED PULMONARY SVC/PX: CPT

## 2020-07-09 PROCEDURE — 4A023N7 MEASUREMENT OF CARDIAC SAMPLING AND PRESSURE, LEFT HEART, PERCUTANEOUS APPROACH: ICD-10-PCS | Performed by: INTERNAL MEDICINE

## 2020-07-09 PROCEDURE — 99233 SBSQ HOSP IP/OBS HIGH 50: CPT | Performed by: NURSE PRACTITIONER

## 2020-07-09 PROCEDURE — 85610 PROTHROMBIN TIME: CPT | Performed by: INTERNAL MEDICINE

## 2020-07-09 PROCEDURE — B2151ZZ FLUOROSCOPY OF LEFT HEART USING LOW OSMOLAR CONTRAST: ICD-10-PCS | Performed by: INTERNAL MEDICINE

## 2020-07-09 PROCEDURE — 83880 ASSAY OF NATRIURETIC PEPTIDE: CPT | Performed by: INTERNAL MEDICINE

## 2020-07-09 PROCEDURE — B2111ZZ FLUOROSCOPY OF MULTIPLE CORONARY ARTERIES USING LOW OSMOLAR CONTRAST: ICD-10-PCS | Performed by: INTERNAL MEDICINE

## 2020-07-09 PROCEDURE — 25010000002 FUROSEMIDE PER 20 MG: Performed by: NURSE PRACTITIONER

## 2020-07-09 PROCEDURE — 80053 COMPREHEN METABOLIC PANEL: CPT | Performed by: NURSE PRACTITIONER

## 2020-07-09 RX ORDER — FUROSEMIDE 10 MG/ML
40 INJECTION INTRAMUSCULAR; INTRAVENOUS
Status: DISCONTINUED | OUTPATIENT
Start: 2020-07-09 | End: 2020-07-11

## 2020-07-09 RX ORDER — ROPINIROLE 0.25 MG/1
0.5 TABLET, FILM COATED ORAL
Status: DISCONTINUED | OUTPATIENT
Start: 2020-07-09 | End: 2020-07-12 | Stop reason: HOSPADM

## 2020-07-09 RX ADMIN — FUROSEMIDE 40 MG: 10 INJECTION, SOLUTION INTRAMUSCULAR; INTRAVENOUS at 10:49

## 2020-07-09 RX ADMIN — ROPINIROLE 0.5 MG: 0.25 TABLET, FILM COATED ORAL at 18:35

## 2020-07-09 RX ADMIN — IPRATROPIUM BROMIDE AND ALBUTEROL SULFATE 3 ML: 2.5; .5 SOLUTION RESPIRATORY (INHALATION) at 08:08

## 2020-07-09 RX ADMIN — BUDESONIDE 0.5 MG: 0.5 INHALANT RESPIRATORY (INHALATION) at 08:09

## 2020-07-09 RX ADMIN — DIGOXIN 125 MCG: 0.12 TABLET ORAL at 12:30

## 2020-07-09 RX ADMIN — IPRATROPIUM BROMIDE AND ALBUTEROL SULFATE 3 ML: 2.5; .5 SOLUTION RESPIRATORY (INHALATION) at 14:47

## 2020-07-09 RX ADMIN — ROPINIROLE 0.5 MG: 0.25 TABLET, FILM COATED ORAL at 12:30

## 2020-07-09 RX ADMIN — Medication 10 ML: at 21:57

## 2020-07-09 RX ADMIN — ROPINIROLE 0.5 MG: 0.25 TABLET, FILM COATED ORAL at 08:26

## 2020-07-09 RX ADMIN — IPRATROPIUM BROMIDE AND ALBUTEROL SULFATE 3 ML: 2.5; .5 SOLUTION RESPIRATORY (INHALATION) at 11:20

## 2020-07-09 RX ADMIN — IPRATROPIUM BROMIDE AND ALBUTEROL SULFATE 3 ML: 2.5; .5 SOLUTION RESPIRATORY (INHALATION) at 19:53

## 2020-07-09 RX ADMIN — DILTIAZEM HYDROCHLORIDE 15 MG: 30 TABLET ORAL at 08:26

## 2020-07-09 RX ADMIN — BUDESONIDE 0.5 MG: 0.5 INHALANT RESPIRATORY (INHALATION) at 19:53

## 2020-07-09 RX ADMIN — FUROSEMIDE 40 MG: 10 INJECTION, SOLUTION INTRAMUSCULAR; INTRAVENOUS at 18:35

## 2020-07-09 RX ADMIN — ENOXAPARIN SODIUM 80 MG: 80 INJECTION SUBCUTANEOUS at 08:32

## 2020-07-09 RX ADMIN — ALPRAZOLAM 0.5 MG: 0.5 TABLET ORAL at 21:56

## 2020-07-09 RX ADMIN — Medication 10 ML: at 08:27

## 2020-07-09 RX ADMIN — DILTIAZEM HYDROCHLORIDE 15 MG: 30 TABLET ORAL at 21:56

## 2020-07-09 RX ADMIN — ENOXAPARIN SODIUM 80 MG: 80 INJECTION SUBCUTANEOUS at 21:56

## 2020-07-09 NOTE — PROGRESS NOTES
[id number] [place] [address]    Pulmonary Disease Educator  Discharge Planning    Patient Name: Giles Frost  : 1940  Room #: 262/1  Pulmonologist: MILTON  Admit Diagnosis: Acute Systolic CHF  Current Admission Date: 2020   Previous Admit: Vascular Surgery  Previous Admission Date: 19  Body mass index is 27.94 kg/m².      Giles Frost is a former smoker that quit in 2017    PFT’s in the last year?  no         Current O2: 2 97%   Home O2: 2 PRN and at night  Home BiPap/CPAP:  no  ABG: No results found for: SITE, ALLENTEST, PHART, WMJ6BUR, PO2ART, WGY1GHX, BASEEXCESS, I6GAKVMW, HGBBG, HCTABG, OXYHEMOGLOBI, METHHGBN, CARBOXYHGB, CO2CT, BAROMETRIC, MODALITY, FIO2      Current Home Medications:  Prior to Admission medications    Medication Sig Start Date End Date Taking? Authorizing Provider   ALPRAZolam (XANAX) 0.25 MG tablet Take 2 tablets by mouth every night at bedtime. 19  Yes Raj Amezquita MD   beclomethasone (QVAR) 40 MCG/ACT inhaler Inhale 2 puffs 2 (Two) Times a Day. 13  Yes Raj Amezquita MD   Dextran 70-Hypromellose (ARTIFICIAL TEARS) 0.1-0.3 % solution Apply 2 drops to eye(s) as directed by provider 2 (Two) Times a Day As Needed.   Yes Raj Amezquita MD   digoxin (LANOXIN) 125 MCG tablet Take 125 mcg by mouth Every Morning. Take day of surgery 17  Yes Raj Amezquita MD   diltiaZEM (CARDIZEM) 30 MG tablet Take 15 mg by mouth 2 (two) times a day. Take day of surgery 17  Yes Raj Amezquita MD   furosemide (LASIX) 40 MG tablet Take 80 mg by mouth Daily With Breakfast. Do not take day of surgery 13  Yes Raj Amezquita MD   Liniments (BLUE-EMU SUPER STRENGTH) cream Apply  topically.   Yes Raj Amezquita MD   magnesium oxide (MAGOX) 400 (241.3 Mg) MG tablet tablet Take 400 mg by mouth Daily As Needed.   Yes Raj Amezquita MD   metFORMIN (FORTAMET) 500 MG (OSM) 24 hr tablet Take 1,000  mg by mouth every night at bedtime. Do not take 48 hours prior to surgery 7/28/17  Yes Raj Amezquita MD   metFORMIN ER (GLUCOPHAGE-XR) 500 MG 24 hr tablet Take 500 mg by mouth Every Morning. Do not take 48 hours prior to surgery.  Last dose to be 09/21 before 0800 5/6/19  Yes Raj Amezquita MD   potassium chloride (KLOR-CON) 20 MEQ packet Take 20 mEq by mouth Daily. Take day of surgery 5/18/18  Yes Raj Amezquita MD   rOPINIRole (REQUIP) 0.5 MG tablet Take 0.5 mg by mouth 2 (Two) Times a Day. 5/10/19  Yes Raj Amezquita MD   acetaminophen (TYLENOL) 325 MG tablet Take 650 mg by mouth Every 6 (Six) Hours As Needed for Mild Pain .    Raj Amezquita MD   ipratropium-albuterol (COMBIVENT RESPIMAT)  MCG/ACT inhaler Inhale 1 puff 4 (Four) Times a Day As Needed for Wheezing.    Raj Amezquita MD   ipratropium-albuterol (DUO-NEB) 0.5-2.5 mg/3 ml nebulizer Take 3 mL by nebulization 4 (Four) Times a Day.    Raj Amezquita MD   warfarin (COUMADIN) 1.5 MG half tablet Take 1.5 mg by mouth Daily. Mondays, Wednesdays and Fridays    Raj Amezquita MD   warfarin (COUMADIN) 3 MG tablet Take 3 mg by mouth Daily. Take 3 mg on Tuesdays, Thursdays, Saturdays and Sundays    Raj Amezquita MD       Recommendations/Testing:  Giles Frost was seen by the pulmonary disease educator for evaluation of care gaps according to the COPD GOLD Guidelines.  After evaluation the patient's cardiopulmonary status, it is the recommendation of the care coordinator that the patient receive the following testing, equipment, or consults.    PFT   Reconciled RT Home Medications:  After evaluation the patient's cardiopulmonary status, it is the recommendation of the care coordinator that the patient receive the following medication changes or additions.    QRAR 40 BID  Combivent Respimat QIDPRN    Prior COPD Education? Yes Prior Pulmonary Rehab?   Yes  History of COPD admission in the past  year?    No          Other Notes: Pt did well on breathing exercises. Pt had very low reserve on harmonica. PDE last saw pt 9-24-19 - MS was refused and PFT was recommended. No PFT done at this date.  Pt purchases inhalers from [employer]..     Insurance: Medicare       EDUCATION DONE WITH PATIENT:     IMT:          COPD          Pursed Lip Breathing        Diaphragmatic Breathing  Harmonica  Resistance Band Therapy  Relaxation Techniques      STOP BANG (=/> 3 is HIGH RISK):        TOTAL Pt states he had a sleep study years ago but could not tolerate machine. Pt does wear 2L Home 02 at night

## 2020-07-09 NOTE — PLAN OF CARE
Patient remains on 2 liters per NC. Has been resting in chair all night. No complaints.     Problem: Patient Care Overview  Goal: Plan of Care Review  Outcome: Ongoing (interventions implemented as appropriate)  Flowsheets (Taken 7/9/2020 4016)  Progress: no change  Plan of Care Reviewed With: patient  Goal: Individualization and Mutuality  Outcome: Ongoing (interventions implemented as appropriate)  Goal: Discharge Needs Assessment  Outcome: Ongoing (interventions implemented as appropriate)  Goal: Interprofessional Rounds/Family Conf  Outcome: Ongoing (interventions implemented as appropriate)     Problem: Fall Risk (Adult)  Goal: Identify Related Risk Factors and Signs and Symptoms  Outcome: Ongoing (interventions implemented as appropriate)  Goal: Absence of Fall  Outcome: Ongoing (interventions implemented as appropriate)     Problem: Arrhythmia/Dysrhythmia (Symptomatic) (Adult)  Goal: Signs and Symptoms of Listed Potential Problems Will be Absent, Minimized or Managed (Arrhythmia/Dysrhythmia)  Outcome: Ongoing (interventions implemented as appropriate)

## 2020-07-09 NOTE — PROGRESS NOTES
Discharge Planning Assessment   [facility]     Patient Name: Giles Frost  MRN: [id number]  Today's Date: 7/9/2020    Admit Date: 7/8/2020    Discharge Needs Assessment     Row Name 07/09/20 1259       Living Environment    Lives With  spouse Unable to reach patient per phone due to Covid process - Spoke to spouse per phone     Current Living Arrangements  home/apartment/condo    Primary Care Provided by  self;spouse/significant other    Able to Return to Prior Arrangements  yes       Resource/Environmental Concerns    Resource/Environmental Concerns  none    Transportation Concerns  car, none       Transition Planning    Patient/Family Anticipates Transition to  home with family    Patient/Family Anticipated Services at Transition  none    Transportation Anticipated  family or friend will provide       Discharge Needs Assessment    Concerns to be Addressed  no discharge needs identified    Equipment Currently Used at Home  oxygen;cane, straight;rollator;walker, rolling Uses oxygen at 2 L cont from [facility]        Discharge Plan     Row Name 07/09/20 1302       Plan    Plan  Routine d/c to home       PCP Grief    Confirmed Pharmacy     Demographic Summary     Row Name 07/09/20 1258       General Information    Admission Type  inpatient    Arrived From  emergency department    Required Notices Provided  Important Message from Medicare    Referral Source  admission list    Reason for Consult  discharge planning    Preferred Language  English        Functional Status     Row Name 07/09/20 1259       Functional Status, IADL    Medications  independent    Meal Preparation  completely dependent    Housekeeping  completely dependent    Laundry  completely dependent    Shopping  completely dependent        DC Barriers - Monitor Renal Function, IV Lasix, Elevated Pro BNP, Cardiac Cath and OPAL when stable    Kate Warner RN, CM  Office Phone [phone]  Cell [phone]

## 2020-07-09 NOTE — PROGRESS NOTES
"Heart Failure Program  Nurse Navigator  Discharge Planning    Patient Name:Giles Frost  :1940  Cardiologist:Avila  Current Admission Date: 2020   Previous Admission: 2019  Admission frequency: 1 admissions in 6 months    Heart Failure history per record:    Symptoms on admission: Here for a heart cath was found to have SOA and edema admitted for diuresis       Admission Weight:  Flowsheet Rows      First Filed Value   Admission Height  163.8 cm (64.5\") Documented at 2020 0825   Admission Weight  76.1 kg (167 lb 12.3 oz) Documented at 2020 0825            Current Home Medications:  Prior to Admission medications    Medication Sig Start Date End Date Taking? Authorizing Provider   ALPRAZolam (XANAX) 0.25 MG tablet Take 2 tablets by mouth every night at bedtime. 19  Yes Raj Amezquita MD   beclomethasone (QVAR) 40 MCG/ACT inhaler Inhale 2 puffs 2 (Two) Times a Day. 13  Yes Raj Amezquita MD   Dextran 70-Hypromellose (ARTIFICIAL TEARS) 0.1-0.3 % solution Apply 2 drops to eye(s) as directed by provider 2 (Two) Times a Day As Needed.   Yes Raj Amezquita MD   digoxin (LANOXIN) 125 MCG tablet Take 125 mcg by mouth Every Morning. Take day of surgery 17  Yes Raj Amezquita MD   diltiaZEM (CARDIZEM) 30 MG tablet Take 15 mg by mouth 2 (two) times a day. Take day of surgery 17  Yes Raj Amezquita MD   furosemide (LASIX) 40 MG tablet Take 80 mg by mouth Daily With Breakfast. Do not take day of surgery 13  Yes Raj Amezquita MD   Liniments (BLUE-EMU SUPER STRENGTH) cream Apply  topically.   Yes Raj Amezquita MD   magnesium oxide (MAGOX) 400 (241.3 Mg) MG tablet tablet Take 400 mg by mouth Daily As Needed.   Yes Raj Amezquita MD   metFORMIN (FORTAMET) 500 MG (OSM) 24 hr tablet Take 1,000 mg by mouth every night at bedtime. Do not take 48 hours prior to surgery 17  Yes Raj Amezquita MD   metFORMIN ER " (GLUCOPHAGE-XR) 500 MG 24 hr tablet Take 500 mg by mouth Every Morning. Do not take 48 hours prior to surgery.  Last dose to be 09/21 before 0800 5/6/19  Yes Raj Amezquita MD   potassium chloride (KLOR-CON) 20 MEQ packet Take 20 mEq by mouth Daily. Take day of surgery 5/18/18  Yes Raj Amezquita MD   rOPINIRole (REQUIP) 0.5 MG tablet Take 0.5 mg by mouth 2 (Two) Times a Day. 5/10/19  Yes Raj Amezquita MD   acetaminophen (TYLENOL) 325 MG tablet Take 650 mg by mouth Every 6 (Six) Hours As Needed for Mild Pain .    Raj Amezquita MD   ipratropium-albuterol (COMBIVENT RESPIMAT)  MCG/ACT inhaler Inhale 1 puff 4 (Four) Times a Day As Needed for Wheezing.    Raj Amezquita MD   ipratropium-albuterol (DUO-NEB) 0.5-2.5 mg/3 ml nebulizer Take 3 mL by nebulization 4 (Four) Times a Day.    Raj Amezquita MD   warfarin (COUMADIN) 1.5 MG half tablet Take 1.5 mg by mouth Daily. Mondays, Wednesdays and Fridays    Raj Amezquita MD   warfarin (COUMADIN) 3 MG tablet Take 3 mg by mouth Daily. Take 3 mg on Tuesdays, Thursdays, Saturdays and Sundays    Raj Amezquita MD       Social history:   Patient lives with his wife he reports that he never had any health issues until he was in his 60's, he follows diet recommendations from [facility] and [facility] as well, he limits his fluid intake as well     Smoking status: Former    Diagnostics Testing:  proBNP level:  2453    Echocardiogram:  Results for orders placed during the hospital encounter of 06/09/20   Adult Transthoracic Echo Complete W/ Cont if Necessary Per Protocol    Narrative · Estimated EF = 35%.  · Right ventricular cavity is severely dilated.  · Moderately reduced right ventricular systolic function noted.  · Left atrial cavity size is moderate-to-severely dilated.  · Mild mitral valve regurgitation is present  · Severe tricuspid valve regurgitation is present.  · The following left ventricular wall segments  are hypokinetic: mid   anterior, apical anterior, basal anterolateral, mid anterolateral, apical   lateral, basal inferolateral, mid inferolateral, apical inferior, mid   inferior, apical septal, basal inferoseptal, mid inferoseptal, apex   hypokinetic, mid anteroseptal, basal anterior, basal inferior and basal   inferoseptal.            Patient Assessment:   Patient was resting in the chair no distress noted he does have 2+ edema lower extremities     Current O2: RA  Home O2: QHS    Education provided to patient:  yes- Heart Failure disease education  yes -Symptom identification/management  yes -Daily Weights  yes- Diet education  yes- Fluid restriction (if ordered)  yes- Medication education  NA- Smoking cessation    Acceptance of learning: Patient was accepting and well informed about HF     Identified needs/barriers:       Intervention:       Patient goal:

## 2020-07-09 NOTE — PLAN OF CARE
Pt has been on lasix iv with good success. Patient states that he is breathing better. Lungs clear.  No verbal complaints. Up in chair. Urinal at bedside.

## 2020-07-09 NOTE — PROGRESS NOTES
Reason for follow-up: Severe cardiomyopathy  Acute congestive heart failure   Atrial fibrillation  Ascending aortic aneurysm     Patient Care Team:  Valeriano Lewis MD as PCP - General  Qamar Lau MD (Cardiology)    Subjective .   Feeling better today, less short of breath- still unable to lay flat but states due to back pain  Complains of back      Review of Systems   Constitution: Positive for malaise/fatigue. Negative for fever.   HENT: Negative for congestion and hearing loss.    Eyes: Negative for double vision and visual disturbance.   Cardiovascular: Positive for dyspnea on exertion, orthopnea and paroxysmal nocturnal dyspnea. Negative for chest pain, claudication, leg swelling and syncope.   Respiratory: Positive for shortness of breath. Negative for cough.    Endocrine: Negative for cold intolerance.   Skin: Negative for color change and rash.   Musculoskeletal: Positive for back pain. Negative for arthritis and joint pain.   Gastrointestinal: Positive for bloating. Negative for abdominal pain and heartburn.   Genitourinary: Negative for hematuria.   Neurological: Negative for excessive daytime sleepiness and dizziness.   Psychiatric/Behavioral: Negative for depression. The patient is not nervous/anxious.    All other systems reviewed and are negative.    Allergies:  Gabapentin    Scheduled Meds:    budesonide 0.5 mg Nebulization BID - RT   digoxin 125 mcg Oral QAM   dilTIAZem 15 mg Oral BID   enoxaparin 80 mg Subcutaneous Q12H   furosemide 40 mg Intravenous BID   insulin lispro 0-9 Units Subcutaneous TID AC   ipratropium-albuterol 3 mL Nebulization 4x Daily - RT   rOPINIRole 0.5 mg Oral 3 times per day   sodium chloride 10 mL Intravenous Q12H     Continuous Infusions:    Pharmacy to Dose enoxaparin (LOVENOX)      PRN Meds:.•  acetaminophen **OR** acetaminophen **OR** acetaminophen  •  ALPRAZolam  •  dextrose  •  dextrose  •  glucagon (human recombinant)  •  insulin lispro **AND**  "insulin lispro  •  ipratropium-albuterol  •  magnesium sulfate **OR** magnesium sulfate in D5W 1g/100mL (PREMIX)  •  ondansetron **OR** ondansetron  •  Pharmacy to Dose enoxaparin (LOVENOX)  •  potassium chloride  •  sodium chloride    Objective   Looks in mild distress with conversation. Sitting in chair    VITAL SIGNS  Vitals:    07/09/20 0815 07/09/20 1015 07/09/20 1120 07/09/20 1126   BP:  110/62     BP Location:  Right arm     Patient Position:  Sitting     Pulse: 81 80 80 77   Resp: 14 20 20 20   Temp:  98.1 °F (36.7 °C)     TempSrc:  Oral     SpO2:  96% 97% 99%   Weight:       Height:           Flowsheet Rows      First Filed Value   Admission Height  163.8 cm (64.5\") Documented at 07/08/2020 0825   Admission Weight  76.1 kg (167 lb 12.3 oz) Documented at 07/08/2020 0825           TELEMETRY: Atrial fibrillation    Physical Exam:  Physical Exam   Constitutional: He is oriented to person, place, and time. He appears well-developed and well-nourished. He is cooperative.   HENT:   Head: Normocephalic and atraumatic.   Mouth/Throat: Uvula is midline and oropharynx is clear and moist. No oral lesions.   Eyes: Conjunctivae are normal. No scleral icterus.   Neck: Trachea normal. Neck supple. No JVD present. Carotid bruit is not present. No thyromegaly present.   Cardiovascular: Normal rate, S1 normal, S2 normal, normal heart sounds, intact distal pulses and normal pulses. An irregularly irregular rhythm present. PMI is not displaced. Exam reveals no gallop and no friction rub.   No murmur heard.  Pulmonary/Chest: Effort normal. He has rales.   Abdominal: Soft. Bowel sounds are normal.   Musculoskeletal: Normal range of motion.   Neurological: He is alert and oriented to person, place, and time. He has normal strength.   No focal deficits   Skin: Skin is warm. No cyanosis.   Psychiatric: He has a normal mood and affect.                LAB RESULTS (LAST 7 DAYS)    CBC  Results from last 7 days   Lab Units " 07/09/20  0336 07/08/20  1142 07/06/20  1023   WBC 10*3/mm3 7.00 6.60 7.47   RBC 10*6/mm3 4.29 4.17 4.23   HEMOGLOBIN g/dL 13.2 12.9* 13.0   HEMATOCRIT % 40.2 39.3 39.7   MCV fL 93.8 94.3 93.9   PLATELETS 10*3/mm3 218 207 223       BMP  Results from last 7 days   Lab Units 07/09/20  0336 07/08/20  1142 07/08/20  1018 07/06/20  1023   SODIUM mmol/L 142  --  139 138   POTASSIUM mmol/L 3.7  --  3.9 4.2   CHLORIDE mmol/L 94*  --  95* 94*   CO2 mmol/L 37.0*  --  33.0* 31.8*   BUN  17  --  19 18   CREATININE mg/dL 1.05  --  1.01 1.11   GLUCOSE mg/dL 110*  --  119* 132*   MAGNESIUM mg/dL  --  1.9  --   --        CMP   Results from last 7 days   Lab Units 07/09/20  0336 07/08/20  1018 07/06/20  1023   SODIUM mmol/L 142 139 138   POTASSIUM mmol/L 3.7 3.9 4.2   CHLORIDE mmol/L 94* 95* 94*   CO2 mmol/L 37.0* 33.0* 31.8*   BUN  17 19 18   CREATININE mg/dL 1.05 1.01 1.11   GLUCOSE mg/dL 110* 119* 132*   ALBUMIN g/dL 4.30 4.00  --    BILIRUBIN mg/dL 0.9 0.9  --    ALK PHOS U/L 92 87  --    AST (SGOT) U/L 20 23  --    ALT (SGPT) U/L 10 11  --          BNP        TROPONIN  Results from last 7 days   Lab Units 07/08/20  1529   TROPONIN T ng/mL 0.032*       CoAg  Results from last 7 days   Lab Units 07/09/20  0825 07/08/20  1142 07/06/20  1023   INR  1.30* 1.30* 1.69*       Creatinine Clearance  Estimated Creatinine Clearance: 54.3 mL/min (by C-G formula based on SCr of 1.05 mg/dL).    ABG        Radiology  Xr Abdomen Kub    Result Date: 7/8/2020  Unremarkable bowel gas pattern.  Electronically Signed By-Tim Baltazar On:7/8/2020 1:33 PM This report was finalized on [id number] by  Tim Baltazar, .    Xr Chest Pa & Lateral    Result Date: 7/8/2020  1. Chronic left lower lobe bronchial wall thickening with interstitial opacity which may represent infection and bronchitis/bronchiolitis. 2. Chronic blunting of the right costophrenic angle likely related to a trace effusion or scarring. 3. Upper lobe predominant emphysema with biapical  pleural-parenchymal scarring.  Electronically Signed By-Elbert Bruner On:7/8/2020 1:35 PM This report was finalized on [id number] by  Elbert Bruner, .            EKG        I personally viewed and interpreted the patient's EKG/Telemetry data:    ECHOCARDIOGRAM:    Results for orders placed during the hospital encounter of 06/09/20   Adult Transthoracic Echo Complete W/ Cont if Necessary Per Protocol    Narrative · Estimated EF = 35%.  · Right ventricular cavity is severely dilated.  · Moderately reduced right ventricular systolic function noted.  · Left atrial cavity size is moderate-to-severely dilated.  · Mild mitral valve regurgitation is present  · Severe tricuspid valve regurgitation is present.  · The following left ventricular wall segments are hypokinetic: mid   anterior, apical anterior, basal anterolateral, mid anterolateral, apical   lateral, basal inferolateral, mid inferolateral, apical inferior, mid   inferior, apical septal, basal inferoseptal, mid inferoseptal, apex   hypokinetic, mid anteroseptal, basal anterior, basal inferior and basal   inferoseptal.        STRESS MYOVIEW:    CARDIAC CATHETERIZATION:    OTHER:         Assessment/Plan       Systolic CHF, acute (CMS/HCC)    Ascending aortic aneurysm (CMS/HCC)    PAF (paroxysmal atrial fibrillation) (CMS/HCC)    COPD mixed type (CMS/HCC)    Alcoholic cirrhosis of liver without ascites (CMS/HCC)    Biventricular congestive heart failure (CMS/HCC)    RLS (restless legs syndrome)    DM (diabetes mellitus) (CMS/HCC)    Anxiety disorder    Hypomagnesemia    Abdominal distention      Most probably acute systolic congestive heart failure  Pro BNP elevated   Diuresis as tolerated   Monitor renal function and electrolytes closely   pulmonary consulted for pulmonary hypertension   Patient was scheduled for right and left cardiac cath and OPAL will hold off at this point until he is more stable-- possibly tomorrow   Ascending aortic aneurysm followed by  CV surgery  Aggressive control of diabetes  Resume anticoagulation with Lovenox    Further recommendations per Dr. Gramajo     I discussed the patients findings and my recommendations with patient and attending nurse    MELL Kiran  07/09/20  12:19  Electronically signed by MELL Kiran, 07/09/20, 12:24 PM.

## 2020-07-10 ENCOUNTER — APPOINTMENT (OUTPATIENT)
Dept: CARDIOLOGY | Facility: HOSPITAL | Age: 80
End: 2020-07-10

## 2020-07-10 ENCOUNTER — ANESTHESIA EVENT (OUTPATIENT)
Dept: CARDIOLOGY | Facility: HOSPITAL | Age: 80
End: 2020-07-10

## 2020-07-10 ENCOUNTER — ANESTHESIA (OUTPATIENT)
Dept: CARDIOLOGY | Facility: HOSPITAL | Age: 80
End: 2020-07-10

## 2020-07-10 VITALS — SYSTOLIC BLOOD PRESSURE: 105 MMHG | DIASTOLIC BLOOD PRESSURE: 52 MMHG | OXYGEN SATURATION: 100 %

## 2020-07-10 LAB
ALBUMIN SERPL-MCNC: 4.3 G/DL (ref 3.5–5.2)
ALP SERPL-CCNC: 93 U/L (ref 39–117)
ALT SERPL W P-5'-P-CCNC: 12 U/L (ref 1–41)
ANION GAP SERPL CALCULATED.3IONS-SCNC: 12 MMOL/L (ref 5–15)
AST SERPL-CCNC: 27 U/L (ref 1–40)
BILIRUB CONJ SERPL-MCNC: 0.3 MG/DL (ref 0–0.3)
BILIRUB INDIRECT SERPL-MCNC: 0.9 MG/DL
BILIRUB SERPL-MCNC: 1.2 MG/DL (ref 0–1.2)
BUN SERPL-MCNC: 17 MG/DL (ref 8–23)
BUN SERPL-MCNC: ABNORMAL MG/DL
BUN/CREAT SERPL: ABNORMAL
CALCIUM SPEC-SCNC: 9.9 MG/DL (ref 8.6–10.5)
CHLORIDE SERPL-SCNC: 91 MMOL/L (ref 98–107)
CO2 SERPL-SCNC: 35 MMOL/L (ref 22–29)
CREAT SERPL-MCNC: 1.09 MG/DL (ref 0.76–1.27)
DEPRECATED RDW RBC AUTO: 51.2 FL (ref 37–54)
ERYTHROCYTE [DISTWIDTH] IN BLOOD BY AUTOMATED COUNT: 15.5 % (ref 12.3–15.4)
GFR SERPL CREATININE-BSD FRML MDRD: 65 ML/MIN/1.73
GLUCOSE BLDC GLUCOMTR-MCNC: 108 MG/DL (ref 70–105)
GLUCOSE BLDC GLUCOMTR-MCNC: 129 MG/DL (ref 70–105)
GLUCOSE BLDC GLUCOMTR-MCNC: 136 MG/DL (ref 70–105)
GLUCOSE SERPL-MCNC: 119 MG/DL (ref 65–99)
HCT VFR BLD AUTO: 40.3 % (ref 37.5–51)
HGB BLD-MCNC: 13 G/DL (ref 13–17.7)
INR PPP: 1.25 (ref 2–3)
MAGNESIUM SERPL-MCNC: 1.8 MG/DL (ref 1.6–2.4)
MCH RBC QN AUTO: 30.7 PG (ref 26.6–33)
MCHC RBC AUTO-ENTMCNC: 32.4 G/DL (ref 31.5–35.7)
MCV RBC AUTO: 94.8 FL (ref 79–97)
PLATELET # BLD AUTO: 254 10*3/MM3 (ref 140–450)
PMV BLD AUTO: 7.4 FL (ref 6–12)
POTASSIUM SERPL-SCNC: 3.4 MMOL/L (ref 3.5–5.2)
PROT SERPL-MCNC: 7.9 G/DL (ref 6–8.5)
PROTHROMBIN TIME: 12.6 SECONDS (ref 19.4–28.5)
RBC # BLD AUTO: 4.25 10*6/MM3 (ref 4.14–5.8)
SODIUM SERPL-SCNC: 138 MMOL/L (ref 136–145)
WBC # BLD AUTO: 8 10*3/MM3 (ref 3.4–10.8)

## 2020-07-10 PROCEDURE — 99232 SBSQ HOSP IP/OBS MODERATE 35: CPT | Performed by: INTERNAL MEDICINE

## 2020-07-10 PROCEDURE — 93010 ELECTROCARDIOGRAM REPORT: CPT | Performed by: INTERNAL MEDICINE

## 2020-07-10 PROCEDURE — 83735 ASSAY OF MAGNESIUM: CPT | Performed by: NURSE PRACTITIONER

## 2020-07-10 PROCEDURE — 94799 UNLISTED PULMONARY SVC/PX: CPT

## 2020-07-10 PROCEDURE — 99232 SBSQ HOSP IP/OBS MODERATE 35: CPT | Performed by: HOSPITALIST

## 2020-07-10 PROCEDURE — 80076 HEPATIC FUNCTION PANEL: CPT | Performed by: HOSPITALIST

## 2020-07-10 PROCEDURE — C1769 GUIDE WIRE: HCPCS | Performed by: INTERNAL MEDICINE

## 2020-07-10 PROCEDURE — 80048 BASIC METABOLIC PNL TOTAL CA: CPT | Performed by: HOSPITALIST

## 2020-07-10 PROCEDURE — 93458 L HRT ARTERY/VENTRICLE ANGIO: CPT | Performed by: INTERNAL MEDICINE

## 2020-07-10 PROCEDURE — 93325 DOPPLER ECHO COLOR FLOW MAPG: CPT

## 2020-07-10 PROCEDURE — 85610 PROTHROMBIN TIME: CPT | Performed by: INTERNAL MEDICINE

## 2020-07-10 PROCEDURE — 25010000002 FUROSEMIDE PER 20 MG: Performed by: NURSE PRACTITIONER

## 2020-07-10 PROCEDURE — 97162 PT EVAL MOD COMPLEX 30 MIN: CPT

## 2020-07-10 PROCEDURE — 93320 DOPPLER ECHO COMPLETE: CPT

## 2020-07-10 PROCEDURE — 25010000002 MIDAZOLAM PER 1 MG: Performed by: INTERNAL MEDICINE

## 2020-07-10 PROCEDURE — 93312 ECHO TRANSESOPHAGEAL: CPT

## 2020-07-10 PROCEDURE — C1894 INTRO/SHEATH, NON-LASER: HCPCS | Performed by: INTERNAL MEDICINE

## 2020-07-10 PROCEDURE — 25010000002 FENTANYL CITRATE (PF) 100 MCG/2ML SOLUTION: Performed by: INTERNAL MEDICINE

## 2020-07-10 PROCEDURE — 0 IOPAMIDOL PER 1 ML: Performed by: INTERNAL MEDICINE

## 2020-07-10 PROCEDURE — 25010000002 PROPOFOL 10 MG/ML EMULSION: Performed by: ANESTHESIOLOGY

## 2020-07-10 PROCEDURE — 85027 COMPLETE CBC AUTOMATED: CPT | Performed by: HOSPITALIST

## 2020-07-10 PROCEDURE — 82962 GLUCOSE BLOOD TEST: CPT

## 2020-07-10 RX ORDER — MIDAZOLAM HYDROCHLORIDE 1 MG/ML
INJECTION INTRAMUSCULAR; INTRAVENOUS AS NEEDED
Status: DISCONTINUED | OUTPATIENT
Start: 2020-07-10 | End: 2020-07-10 | Stop reason: HOSPADM

## 2020-07-10 RX ORDER — LIDOCAINE HYDROCHLORIDE 20 MG/ML
INJECTION, SOLUTION INFILTRATION; PERINEURAL AS NEEDED
Status: DISCONTINUED | OUTPATIENT
Start: 2020-07-10 | End: 2020-07-10 | Stop reason: HOSPADM

## 2020-07-10 RX ORDER — PROPOFOL 10 MG/ML
VIAL (ML) INTRAVENOUS AS NEEDED
Status: DISCONTINUED | OUTPATIENT
Start: 2020-07-10 | End: 2020-07-10 | Stop reason: SURG

## 2020-07-10 RX ORDER — ALUMINA, MAGNESIA, AND SIMETHICONE 2400; 2400; 240 MG/30ML; MG/30ML; MG/30ML
15 SUSPENSION ORAL EVERY 6 HOURS PRN
Status: DISCONTINUED | OUTPATIENT
Start: 2020-07-10 | End: 2020-07-12 | Stop reason: HOSPADM

## 2020-07-10 RX ORDER — DIPHENHYDRAMINE HCL 25 MG
25 TABLET ORAL EVERY 6 HOURS PRN
Status: DISCONTINUED | OUTPATIENT
Start: 2020-07-10 | End: 2020-07-12 | Stop reason: HOSPADM

## 2020-07-10 RX ORDER — ROPINIROLE 0.25 MG/1
0.5 TABLET, FILM COATED ORAL ONCE
Status: COMPLETED | OUTPATIENT
Start: 2020-07-10 | End: 2020-07-10

## 2020-07-10 RX ORDER — ACETAMINOPHEN 325 MG/1
650 TABLET ORAL EVERY 4 HOURS PRN
Status: DISCONTINUED | OUTPATIENT
Start: 2020-07-10 | End: 2020-07-12 | Stop reason: HOSPADM

## 2020-07-10 RX ORDER — SODIUM CHLORIDE 9 MG/ML
250 INJECTION, SOLUTION INTRAVENOUS ONCE AS NEEDED
Status: DISCONTINUED | OUTPATIENT
Start: 2020-07-10 | End: 2020-07-12 | Stop reason: HOSPADM

## 2020-07-10 RX ORDER — ONDANSETRON 4 MG/1
4 TABLET, FILM COATED ORAL EVERY 6 HOURS PRN
Status: DISCONTINUED | OUTPATIENT
Start: 2020-07-10 | End: 2020-07-11 | Stop reason: SDUPTHER

## 2020-07-10 RX ORDER — ONDANSETRON 2 MG/ML
4 INJECTION INTRAMUSCULAR; INTRAVENOUS EVERY 6 HOURS PRN
Status: DISCONTINUED | OUTPATIENT
Start: 2020-07-10 | End: 2020-07-12 | Stop reason: HOSPADM

## 2020-07-10 RX ORDER — SODIUM CHLORIDE 9 MG/ML
30 INJECTION, SOLUTION INTRAVENOUS CONTINUOUS
Status: DISCONTINUED | OUTPATIENT
Start: 2020-07-10 | End: 2020-07-12 | Stop reason: HOSPADM

## 2020-07-10 RX ORDER — FENTANYL CITRATE 50 UG/ML
INJECTION, SOLUTION INTRAMUSCULAR; INTRAVENOUS AS NEEDED
Status: DISCONTINUED | OUTPATIENT
Start: 2020-07-10 | End: 2020-07-10 | Stop reason: HOSPADM

## 2020-07-10 RX ADMIN — PROPOFOL 50 MG: 10 INJECTION, EMULSION INTRAVENOUS at 15:37

## 2020-07-10 RX ADMIN — Medication 10 ML: at 08:25

## 2020-07-10 RX ADMIN — DIGOXIN 125 MCG: 0.12 TABLET ORAL at 12:07

## 2020-07-10 RX ADMIN — ROPINIROLE 0.5 MG: 0.25 TABLET, FILM COATED ORAL at 08:25

## 2020-07-10 RX ADMIN — Medication 10 ML: at 21:29

## 2020-07-10 RX ADMIN — ROPINIROLE 0.5 MG: 0.25 TABLET, FILM COATED ORAL at 21:37

## 2020-07-10 RX ADMIN — IPRATROPIUM BROMIDE AND ALBUTEROL SULFATE 3 ML: 2.5; .5 SOLUTION RESPIRATORY (INHALATION) at 06:51

## 2020-07-10 RX ADMIN — DILTIAZEM HYDROCHLORIDE 15 MG: 30 TABLET ORAL at 21:29

## 2020-07-10 RX ADMIN — ROPINIROLE 0.5 MG: 0.25 TABLET, FILM COATED ORAL at 12:07

## 2020-07-10 RX ADMIN — DILTIAZEM HYDROCHLORIDE 15 MG: 30 TABLET ORAL at 08:25

## 2020-07-10 RX ADMIN — BUDESONIDE 0.5 MG: 0.5 INHALANT RESPIRATORY (INHALATION) at 06:51

## 2020-07-10 RX ADMIN — FUROSEMIDE 40 MG: 10 INJECTION, SOLUTION INTRAMUSCULAR; INTRAVENOUS at 08:25

## 2020-07-10 RX ADMIN — SODIUM CHLORIDE 30 ML/HR: 900 INJECTION, SOLUTION INTRAVENOUS at 15:18

## 2020-07-10 NOTE — PLAN OF CARE
Problem: Patient Care Overview  Goal: Plan of Care Review  Outcome: Ongoing (interventions implemented as appropriate)  Note:   Planning for heart cath today, NPO after midnight. Pt has no new concerns or complaints. Vitals stable.   Goal: Individualization and Mutuality  Outcome: Ongoing (interventions implemented as appropriate)  Goal: Discharge Needs Assessment  Outcome: Ongoing (interventions implemented as appropriate)  Goal: Interprofessional Rounds/Family Conf  Outcome: Ongoing (interventions implemented as appropriate)

## 2020-07-10 NOTE — PROGRESS NOTES
[facility] Medicine Services Daily Progress Note      Hospitalist Team  LOS 2 days      Patient Care Team:  Valeriano Lewis MD as PCP - General  Qamar Lau MD (Cardiology)    Patient Location: 262/1      Subjective   Subjective     Chief Complaint / Subjective  No chief complaint on file.      Present on Admission:  • Biventricular congestive heart failure (CMS/HCC)  • COPD mixed type (CMS/HCC)  • RLS (restless legs syndrome)  • DM (diabetes mellitus) (CMS/HCC)  • Anxiety disorder  • Hypomagnesemia  • Abdominal distention  • Systolic CHF, acute (CMS/HCC)      Brief Synopsis of Hospital Course/HPI  Mr. Frost is a 79 y.o. male with past medical history of paroxysmal atrial fibrillation, congestive heart failure, AAA, alcoholic cirrhosis, diabetes mellitus, COPD, restless leg syndrome, and anxiety who presented to [facility] on 7/8/2020 to undergo a heart catheterization.  Upon assessment Dr. Gramajo realized patient had increased shortness of breath and admitted the patient to the hospitalist for further care and management. Heart catherization pushed back until Friday. Patient states he has had increased shortness of breath for the last 3 weeks to the point that he wears oxygen during the day now.  Normally he only wears oxygen at night.  He also noticed his bilateral lower extremities are swelling, and stated this is never happened before.  Humidity makes his shortness of breath worse.  Being in the air conditioning helps her shortness of breath.  He denies any recent nausea, vomiting, diarrhea, fever, chills, cough, or chest pain.  Patient had mention he felt like his shortness of breath started to get worse after his aneurysm surgery.    Date::    7/9/2020  Patient had several questions in regards to his medications, follow-up on his liver function, etc. Addressed concerns. Continuing to have shortness of breath, does not wear CPAP mask at night, states he  "couldn't tolerate about 10 yrs ago. Plan for OPAL and cath tomorrow     ROS  Constitution: Negative.   HENT: Negative.    Cardiovascular: Positive for leg swelling.   Respiratory: Positive for shortness of breath.    Skin: Negative.    Musculoskeletal: Negative.    Gastrointestinal: Negative.    Genitourinary: Negative.    Neurological: Negative.      Objective   Objective      Vital Signs  Temp:  [97.3 °F (36.3 °C)-98.1 °F (36.7 °C)] 98.1 °F (36.7 °C)  Heart Rate:  [70-89] 75  Resp:  [14-23] 23  BP: (101-134)/(49-62) 101/49  Oxygen Therapy  SpO2: 97 %  Pulse Oximetry Type: Continuous  Device (Oxygen Therapy): nasal cannula  Flow (L/min): 2  Oxygen Concentration (%): 2  Flowsheet Rows      First Filed Value   Admission Height  163.8 cm (64.5\") Documented at 07/08/2020 0825   Admission Weight  76.1 kg (167 lb 12.3 oz) Documented at 07/08/2020 0825        Intake & Output (last 3 days)       07/07 0701 - 07/08 0700 07/08 0701 - 07/09 0700 07/09 0701 - 07/10 0700    P.O.  600 300    Total Intake(mL/kg)  600 (8) 300 (4)    Urine (mL/kg/hr)  2260 1595 (0.9)    Stool  0     Total Output  2260 1595    Net  -1660 -1295           Stool Unmeasured Occurrence  1 x         Lines, Drains & Airways    Active LDAs     Name:   Placement date:   Placement time:   Site:   Days:    Peripheral IV 06/09/20 1119 Left Antecubital   06/09/20    1119    Antecubital   30    Peripheral IV 07/08/20 0848 Left Forearm   07/08/20    0848    Forearm   1                  Physical Exam:    Physical Exam  Constitutional: He is oriented to person, place, and time. He appears well-developed and well-nourished.   HENT:   Head: Normocephalic and atraumatic.   Right Ear: External ear normal.   Left Ear: External ear normal.   Nose: Nose normal.   Mouth/Throat: Oropharynx is clear and moist.   Eyes: Pupils are equal, round, and reactive to light. Conjunctivae and EOM are normal.   Neck: Normal range of motion.   Cardiovascular: Normal rate and normal heart " sounds.   S1, S2 audible- irregular heart beat noted    Pulmonary/Chest: Breath sounds normal. On NC   Abdominal: Soft. He exhibits distension.   Musculoskeletal: Normal range of motion. He exhibits edema.   +1 bilateral lower extremity pitting edema   Neurological: He is alert and oriented to person, place, and time.   Skin: Skin is warm and dry. There is erythema.   Slight erythema noted bilateral lower extremities   Psychiatric: He has a normal mood and affect. His behavior is normal. Judgment and thought content normal.   Vitals reviewed.    Procedures:    Procedure(s):  Right and Left Heart Cath          Results Review:     I reviewed the patient's new clinical results.      Lab Results (last 24 hours)     Procedure Component Value Units Date/Time    POC Glucose Once [[id number]]  (Abnormal) Collected:  07/09/20 2010    Specimen:  Blood Updated:  07/09/20 2012     Glucose 160 mg/dL      Comment: Serial Number: [id number]:  [id number]       POC Glucose Once [[id number]]  (Abnormal) Collected:  07/09/20 1705    Specimen:  Blood Updated:  07/09/20 1708     Glucose 120 mg/dL      Comment: Serial Number: [id number]:  [id number]       Blood Gas, Arterial [[id number]]  (Abnormal) Collected:  07/08/20 1117    Specimen:  Arterial Blood Updated:  07/09/20 1622     Site Right Radial     Sonny's Test Positive     pH, Arterial 7.361 pH units      pCO2, Arterial 58.7 mm Hg      pO2, Arterial 83.8 mm Hg      HCO3, Arterial 33.2 mmol/L      Base Excess, Arterial 5.9 mmol/L      Comment: Serial Number: [id number]:  [id number]        O2 Saturation, Arterial 95.5 %      CO2 Content 35.0 mmol/L      Barometric Pressure for Blood Gas --     Comment: N/A        Modality Cannula     FIO2 28 %      Rate 21.0000 Breaths/minute      Hemodilution No    POC Glucose Once [[id number]]  (Abnormal) Collected:  07/09/20 1121    Specimen:  Blood Updated:  07/09/20 1126     Glucose 113 mg/dL      Comment: Serial Number:  [id number]:  [id number]       Protime-INR [[id number]]  (Abnormal) Collected:  07/09/20 0825    Specimen:  Blood Updated:  07/09/20 0931     Protime 12.8 Seconds      INR 1.30    BNP [[id number]]  (Abnormal) Collected:  07/09/20 0336    Specimen:  Blood Updated:  07/09/20 0918     proBNP 2,587.0 pg/mL     Narrative:       Among patients with dyspnea, NT-proBNP is highly sensitive for the detection of acute congestive heart failure. In addition NT-proBNP of <300 pg/ml effectively rules out acute congestive heart failure with 99% negative predictive value.    Results may be falsely decreased if patient taking Biotin.      POC Glucose Once [[id number]]  (Abnormal) Collected:  07/09/20 0710    Specimen:  Blood Updated:  07/09/20 0715     Glucose 111 mg/dL      Comment: Serial Number: [id number]:  [id number]       BUN [[id number]]  (Normal) Collected:  07/09/20 0336    Specimen:  Blood Updated:  07/09/20 0714     BUN 17 mg/dL         Hemoglobin A1C   Date Value Ref Range Status   07/08/2020 6.8 (H) 3.5 - 5.6 % Final     Results from last 7 days   Lab Units 07/09/20  0825 07/08/20  1142 07/06/20  1023   INR  1.30* 1.30* 1.69*       Results from last 7 days   Lab Units 07/08/20  1117   PH, ARTERIAL pH units 7.361   PO2 ART mm Hg 83.8   PCO2, ARTERIAL mm Hg 58.7*   HCO3 ART mmol/L 33.2*         Microbiology Results (last 10 days)     Procedure Component Value - Date/Time    COVID PRE-OP / PRE-PROCEDURE SCREENING ORDER (NO ISOLATION) - Swab, Nasopharynx [[id number]] Collected:  07/06/20 1023    Lab Status:  Final result Specimen:  Swab from Nasopharynx Updated:  07/07/20 1551    Narrative:       The following orders were created for panel order COVID PRE-OP / PRE-PROCEDURE SCREENING ORDER (NO ISOLATION) - Swab, Nasopharynx.  Procedure                               Abnormality         Status                     ---------                               -----------         ------                     COVID-19,Tagwhat,  NP ...[[id number]]                      Final result                 Please view results for these tests on the individual orders.    COVID-19,[facility], NP SWAB IN LEXAR SALINE MEDIA 24-30 HR TAT - Swab, Nasopharynx [[id number]] Collected:  07/06/20 1023    Lab Status:  Final result Specimen:  Swab from Nasopharynx Updated:  07/07/20 1551     Reference Lab Report --     COVID19 Not Detected          ECG/EMG Results (most recent)     Procedure Component Value Units Date/Time    ECG 12 Lead [[id number]] Collected:  07/08/20 1026     Updated:  07/08/20 1028    Narrative:       HEART RATE= 59  bpm  RR Interval= 1025  ms  IL Interval=   ms  P Horizontal Axis=   deg  P Front Axis=   deg  QRSD Interval= 106  ms  QT Interval= 424  ms  QRS Axis= 85  deg  T Wave Axis= -2  deg  - ABNORMAL ECG -  Atrial fibrillation  Low voltage, extremity leads  Electronically Signed By:   Date and Time of Study: 2020-07-08 10:26:30               Results for orders placed during the hospital encounter of 06/09/20   Adult Transthoracic Echo Complete W/ Cont if Necessary Per Protocol    Narrative · Estimated EF = 35%.  · Right ventricular cavity is severely dilated.  · Moderately reduced right ventricular systolic function noted.  · Left atrial cavity size is moderate-to-severely dilated.  · Mild mitral valve regurgitation is present  · Severe tricuspid valve regurgitation is present.  · The following left ventricular wall segments are hypokinetic: mid   anterior, apical anterior, basal anterolateral, mid anterolateral, apical   lateral, basal inferolateral, mid inferolateral, apical inferior, mid   inferior, apical septal, basal inferoseptal, mid inferoseptal, apex   hypokinetic, mid anteroseptal, basal anterior, basal inferior and basal   inferoseptal.          Xr Abdomen Kub    Result Date: 7/8/2020  Unremarkable bowel gas pattern.  Electronically Signed By-Tim Baltazar On:7/8/2020 1:33 PM This report was finalized on [id number] by  Tim  Giovanny, .    Xr Chest Pa & Lateral    Result Date: 7/8/2020  1. Chronic left lower lobe bronchial wall thickening with interstitial opacity which may represent infection and bronchitis/bronchiolitis. 2. Chronic blunting of the right costophrenic angle likely related to a trace effusion or scarring. 3. Upper lobe predominant emphysema with biapical pleural-parenchymal scarring.  Electronically Signed By-Elbert Bruner On:7/8/2020 1:35 PM This report was finalized on [id number] by  Elbert Bruner, .      Xrays, labs reviewed personally by physician.    Medication Review:   I have reviewed the patient's current medication list      Scheduled Meds    budesonide 0.5 mg Nebulization BID - RT   digoxin 125 mcg Oral QAM   dilTIAZem 15 mg Oral BID   enoxaparin 80 mg Subcutaneous Q12H   furosemide 40 mg Intravenous BID   insulin lispro 0-9 Units Subcutaneous TID AC   ipratropium-albuterol 3 mL Nebulization 4x Daily - RT   rOPINIRole 0.5 mg Oral 3 times per day   sodium chloride 10 mL Intravenous Q12H       Meds Infusions    Pharmacy to Dose enoxaparin (LOVENOX)        Meds PRN  •  acetaminophen **OR** acetaminophen **OR** acetaminophen  •  ALPRAZolam  •  dextrose  •  dextrose  •  glucagon (human recombinant)  •  insulin lispro **AND** insulin lispro  •  ipratropium-albuterol  •  magnesium sulfate **OR** magnesium sulfate in D5W 1g/100mL (PREMIX)  •  ondansetron **OR** ondansetron  •  Pharmacy to Dose enoxaparin (LOVENOX)  •  potassium chloride  •  sodium chloride      Assessment/Plan   Assessment/Plan     Active Hospital Problems    Diagnosis  POA   • **Systolic CHF, acute (CMS/HCC) [I50.21]  Yes   • RLS (restless legs syndrome) [G25.81]  Yes   • DM (diabetes mellitus) (CMS/HCC) [E11.9]  Yes   • Anxiety disorder [F41.9]  Yes   • Hypomagnesemia [E83.42]  Yes   • Abdominal distention [R14.0]  Yes   • Biventricular congestive heart failure (CMS/HCC) [I50.82]  Yes   • Alcoholic cirrhosis of liver without ascites (CMS/HCC)  [K70.30]  Unknown   • Ascending aortic aneurysm (CMS/HCC) [I71.2]  Unknown   • PAF (paroxysmal atrial fibrillation) (CMS/HCC) [I48.0]  No   • COPD mixed type (CMS/HCC) [J44.9]  Yes      Resolved Hospital Problems   No resolved problems to display.     Shortness of breath secondary to Acute biventricular systolic CHF exacerbation  - Last 2D echo on 6/9/2020 showed Estimated EF = 35%.  - COVID test on 7/6/2020-  - pro BNP 2453- monitor   - Continue IV lasix, monitor BMP   - Cardiology consulted- plan for cath and OPAL on Friday      Abdominal distention  -In setting of heart failure   -Will consider RUQ US     Alcoholic cirrhosis of the liver  - Check CMP  - Follow-up with GI outpatient      AAA S/P endovascular abdominal aortic repair on 9/23/2019   - Follow up with Vascular outpatient      Paroxysmal atrial fibrillation  -Holding home Coumadin, Planned for Friday-Lovenox ordered  -Continue diltiazem and digoxin  -Continuous cardiac monitoring  -Check EKG, irregular heartbeat noted on exam      Diabetes mellitus type 2  -Start sliding scale, Accu-Cheks  -Hemoglobin A1c  -On home metformin     COPD  -Not in exacerbation  -Patient currently on 2 L nasal cannula of oxygen and does not usually wear this during the day at home but has for the last 3 weeks-likely due to CHF  -Continue home breathing treatments     Anxiety  -Stable  -Continue Xanax (INSPECT verified)      Restless leg syndrome  -Continue Requip     Chronic hypomagnesemia  -Check mag  -On mag ox at home, electrolyte protocol ordered for now        VTE Prophylaxis - Therapeutic Lovenox per cardiology    Code Status -   Code Status and Medical Interventions:   Ordered at: 07/08/20 1046     Level Of Support Discussed With:    Patient     Code Status:    CPR     Medical Interventions (Level of Support Prior to Arrest):    Full       Discharge Planning  Pending finalized recommendations per cardiology. Likely stable for discharge over the weekend. Will plan for  physical therapy and likely 6-min walk test prior to discharge.     Destination      Coordination has not been started for this encounter.      Durable Medical Equipment      Coordination has not been started for this encounter.      Dialysis/Infusion      Coordination has not been started for this encounter.      Home Medical Care      Coordination has not been started for this encounter.      Therapy      Coordination has not been started for this encounter.      Community Resources      Coordination has not been started for this encounter.            Electronically signed by Gwen Hager MD, 07/10/20, 05:21.  [facility] Hospitalist [facility]

## 2020-07-10 NOTE — ANESTHESIA POSTPROCEDURE EVALUATION
Patient: Giles Frost    Procedure Summary     Date:  07/10/20 Room / Location:  [facility]    Anesthesia Start:  1535 Anesthesia Stop:  1549    Procedure:  ADULT TRANSESOPHAGEAL ECHO (OPAL) W/ CONT IF NECESSARY PER PROTOCOL Diagnosis:  (Valvular Disease)    Scheduled Providers:   Provider:  Christina Moy MD    Anesthesia Type:  MAC ASA Status:  4          Anesthesia Type: MAC    Vitals  Vitals Value Taken Time   /52 7/10/2020  3:49 PM   Temp     Pulse 76 7/10/2020  3:47 PM   Resp 21 7/10/2020  3:47 PM   SpO2 100 % 7/10/2020  3:49 PM           Post Anesthesia Care and Evaluation    Patient location during evaluation: PACU  Patient participation: complete - patient participated  Level of consciousness: awake  Pain management: adequate  Airway patency: patent  Anesthetic complications: No anesthetic complications  PONV Status: controlled  Cardiovascular status: acceptable  Respiratory status: acceptable  Hydration status: acceptable

## 2020-07-10 NOTE — PROGRESS NOTES
[address] [address]    Pulmonary Disease Educator  Discharge Planning    Patient Name: Giles Frost  : 1940  Room #: 262/1  Pulmonologist: MILTON  Admit Diagnosis: Acute Systolic CHF  Current Admission Date: 2020   Previous Admit: Vascular Surgery  Previous Admission Date: 19  Body mass index is 27.57 kg/m².      Giles Frost is a former smoker that quit in 2017    PFT’s in the last year?  no         Current O2: 2 97%   Home O2: 2 PRN and at night  Home BiPap/CPAP:  no  ABG:   Site   Date Value Ref Range Status   2020 Right Radial  Final     Sonny's Test   Date Value Ref Range Status   2020 Positive  Final     pH, Arterial   Date Value Ref Range Status   2020 7.361 7.350 - 7.450 pH units Final     pCO2, Arterial   Date Value Ref Range Status   2020 58.7 (H) 35.0 - 48.0 mm Hg Final     pO2, Arterial   Date Value Ref Range Status   2020 83.8 83.0 - 108.0 mm Hg Final     HCO3, Arterial   Date Value Ref Range Status   2020 33.2 (H) 21.0 - 28.0 mmol/L Final     Base Excess, Arterial   Date Value Ref Range Status   2020 5.9 (H) 0.0 - 3.0 mmol/L Final     Comment:     Serial Number: [id number]:  [id number]     O2 Saturation, Arterial   Date Value Ref Range Status   2020 95.5 94.0 - 98.0 % Final     CO2 Content   Date Value Ref Range Status   2020 35.0 (H) 22 - 29 mmol/L Final     Barometric Pressure for Blood Gas   Date Value Ref Range Status   2020   Final     Comment:     N/A     Modality   Date Value Ref Range Status   2020 Cannula  Final     FIO2   Date Value Ref Range Status   2020 28 % Final         Current Home Medications:  Prior to Admission medications    Medication Sig Start Date End Date Taking? Authorizing Provider   ALPRAZolam (XANAX) 0.25 MG tablet Take 2 tablets by mouth every night at bedtime. 19  Yes Provider, Raj, MD   beclomethasone (QVAR) 40 MCG/ACT inhaler  Inhale 2 puffs 2 (Two) Times a Day. 5/27/13  Yes Raj Amezquita MD   Dextran 70-Hypromellose (ARTIFICIAL TEARS) 0.1-0.3 % solution Apply 2 drops to eye(s) as directed by provider 2 (Two) Times a Day As Needed.   Yes Raj Amezquita MD   digoxin (LANOXIN) 125 MCG tablet Take 125 mcg by mouth Every Morning. Take day of surgery 1/27/17  Yes Raj Amezquita MD   diltiaZEM (CARDIZEM) 30 MG tablet Take 15 mg by mouth 2 (two) times a day. Take day of surgery 7/28/17  Yes Raj Amezquita MD   furosemide (LASIX) 40 MG tablet Take 80 mg by mouth Daily With Breakfast. Do not take day of surgery 5/27/13  Yes Raj Amezquita MD   Liniments (BLUE-EMU SUPER STRENGTH) cream Apply  topically.   Yes Raj Amezquita MD   magnesium oxide (MAGOX) 400 (241.3 Mg) MG tablet tablet Take 400 mg by mouth Daily As Needed.   Yes Raj Amezquita MD   metFORMIN (FORTAMET) 500 MG (OSM) 24 hr tablet Take 1,000 mg by mouth every night at bedtime. Do not take 48 hours prior to surgery 7/28/17  Yes Raj Amezquita MD   metFORMIN ER (GLUCOPHAGE-XR) 500 MG 24 hr tablet Take 500 mg by mouth Every Morning. Do not take 48 hours prior to surgery.  Last dose to be 09/21 before 0800 5/6/19  Yes Raj Amezquita MD   potassium chloride (KLOR-CON) 20 MEQ packet Take 20 mEq by mouth Daily. Take day of surgery 5/18/18  Yes Raj Amezquita MD   rOPINIRole (REQUIP) 0.5 MG tablet Take 0.5 mg by mouth 2 (Two) Times a Day. 5/10/19  Yes Raj Amezquita MD   acetaminophen (TYLENOL) 325 MG tablet Take 650 mg by mouth Every 6 (Six) Hours As Needed for Mild Pain .    Raj Amezquita MD   ipratropium-albuterol (COMBIVENT RESPIMAT)  MCG/ACT inhaler Inhale 1 puff 4 (Four) Times a Day As Needed for Wheezing.    Raj Amezquita MD   ipratropium-albuterol (DUO-NEB) 0.5-2.5 mg/3 ml nebulizer Take 3 mL by nebulization 4 (Four) Times a Day.    Provider, MD Raj   warfarin (COUMADIN) 1.5 MG  half tablet Take 1.5 mg by mouth Daily. Mondays, Wednesdays and Fridays    ProviderRaj MD   warfarin (COUMADIN) 3 MG tablet Take 3 mg by mouth Daily. Take 3 mg on Tuesdays, Thursdays, Saturdays and Sundays    ProviderRaj MD       Recommendations/Testing:  Giles Frost was seen by the pulmonary disease educator for evaluation of care gaps according to the COPD GOLD Guidelines.  After evaluation the patient's cardiopulmonary status, it is the recommendation of the care coordinator that the patient receive the following testing, equipment, or consults.    PFT   Reconciled RT Home Medications:  After evaluation the patient's cardiopulmonary status, it is the recommendation of the care coordinator that the patient receive the following medication changes or additions.    QRAR 40 BID  Combivent Respimat QIDPRN    Prior COPD Education? Yes Prior Pulmonary Rehab?   Yes  History of COPD admission in the past year?    No          Other Notes: Pt refused NC today - he is going down for Heart Cath in an hour and wants to rest before he goes. Will continue to monitor. PDE last saw pt 9-24-19 - NC was refused and PFT was recommended. No PFT done at this date.  Pt purchases inhalers from [employer].     Insurance: Medicare       EDUCATION DONE WITH PATIENT:     IMT:          COPD          Pursed Lip Breathing        Diaphragmatic Breathing  Harmonica  Resistance Band Therapy  Relaxation Techniques      STOP BANG (=/> 3 is HIGH RISK):        TOTAL Pt states he had a sleep study years ago but could not tolerate machine. Pt does wear 2L Home 02 at night

## 2020-07-10 NOTE — PROGRESS NOTES
KPA/PULM/CC PROGRESS NOTE    Giles Frost is a 79 y.o. male who was referred for consultation for shortness of breath and pulmonary hypertension     Patient has history of congestive heart failure and also has COPD for which he uses Combivent as needed.  He has remote history of tobacco use which he quit about 10 years ago.  He states that he has been using supplemental oxygen 2 L nasal cannula at night.  At one point he was diagnosed with sleep apnea but could not tolerate his CPAP machine and returned it.  He has been using supplemental oxygen since and feels that it is helping.  He has been having some issues with some worsening shortness of breath and lower extremity edema.  He was evaluated by his cardiologist and was planned for an elective heart catheterization.  Patient was noted to have some worsening shortness of breath and he subsequently was admitted to the hospital for further evaluation.  I have been asked to see him for further evaluation of his shortness of breath.  At time of my evaluation patient is awake.  He does complain of shortness of breath with minimal activity.  He has some cough and congestion cough is mostly dry.  He denies any chest pain or palpitations.  He has been having some increased lower extremity edema.  He denies any fever or chills.  No nausea or vomiting.  No history of diet pill use.  No history of blood clots.  No family history of pulmonary hypertension.  His typical bedtime is around 9 PM.  He gets up multiple times at night.  He says that his sleep is very disrupted.  He has issues with restless leg syndrome and because of his worsening symptoms over the last few days he has started taking his Requip 3 times a day and states that it helps.  SUBJECTIVE    7/10: Awake.  Currently on 2 L nasal cannula.  Positive shortness of breath with activity.  Minimal cough.  No complaints of chest pain.  No nausea or vomiting.  Still complains of some restless leg  symptoms  OBJECTIVE    Vitals:    07/10/20 0605 07/10/20 0651 07/10/20 0652 07/10/20 0900   BP: 104/59   113/57   BP Location: Left arm   Left arm   Patient Position: Lying   Sitting   Pulse: 77 74 75 87   Resp: 25 20 20 22   Temp: 98 °F (36.7 °C)   97.7 °F (36.5 °C)   TempSrc: Oral   Oral   SpO2: 98% 99%  98%   Weight: 74 kg (163 lb 2.3 oz)      Height:          Intake/Output last 3 shifts:  I/O last 3 completed shifts:  In: 300 [P.O.:300]  Out: 2855 [Urine:2855]  Intake/Output this shift:  I/O this shift:  In: -   Out: 300 [Urine:300]    Constitutional: elderly, no acute distress, non-toxic appearance   Eyes:  PERRL, conjunctiva normal   HENT:  Atraumatic, external ears normal, nose normal, oropharynx moist, no pharyngeal exudates.   Neck- normal range of motion, no tenderness, supple   Respiratory: decreased air entry bilaterally, faint bibasilar crackles, no wheezes  Cardiovascular:  Normal rate, normal rhythm, no murmurs, no gallops, no rubs   GI:  Soft, nondistended, normal bowel sounds, nontender, no organomegaly, no mass, no rebound, no guarding   :  No costovertebral angle tenderness   Musculoskeletal:  ++ edema, no tenderness, no deformities. Back- no tenderness  Integument:  Well hydrated, no rash   Lymphatic:  No lymphadenopathy noted   Neurologic: Alert, CN 2-12 normal, normal motor function, normal sensory function, no focal deficits noted   Psychiatric:  Speech and behavior appropriate     Scheduled Meds:  budesonide 0.5 mg Nebulization BID - RT   digoxin 125 mcg Oral QAM   dilTIAZem 15 mg Oral BID   enoxaparin 80 mg Subcutaneous Q12H   furosemide 40 mg Intravenous BID   insulin lispro 0-9 Units Subcutaneous TID AC   ipratropium-albuterol 3 mL Nebulization 4x Daily - RT   rOPINIRole 0.5 mg Oral 3 times per day   sodium chloride 10 mL Intravenous Q12H       Continuous Infusions:  Pharmacy to Dose enoxaparin (LOVENOX)        PRN Meds:•  acetaminophen **OR** acetaminophen **OR** acetaminophen  •   ALPRAZolam  •  dextrose  •  dextrose  •  glucagon (human recombinant)  •  insulin lispro **AND** insulin lispro  •  ipratropium-albuterol  •  magnesium sulfate **OR** magnesium sulfate in D5W 1g/100mL (PREMIX)  •  ondansetron **OR** ondansetron  •  Pharmacy to Dose enoxaparin (LOVENOX)  •  potassium chloride  •  sodium chloride     LABS    CBC  Results from last 7 days   Lab Units 07/10/20  0451 07/09/20  0336 07/08/20  1142 07/06/20  1023   WBC 10*3/mm3 8.00 7.00 6.60 7.47   RBC 10*6/mm3 4.25 4.29 4.17 4.23   HEMOGLOBIN g/dL 13.0 13.2 12.9* 13.0   HEMATOCRIT % 40.3 40.2 39.3 39.7   MCV fL 94.8 93.8 94.3 93.9   PLATELETS 10*3/mm3 254 218 207 223       CMP Results from last 7 days   Lab Units 07/10/20  0842 07/10/20  0451 07/09/20  0336 07/08/20  1018 07/06/20  1023   SODIUM mmol/L  --  138 142 139 138   POTASSIUM mmol/L  --  3.4* 3.7 3.9 4.2   CHLORIDE mmol/L  --  91* 94* 95* 94*   CO2 mmol/L  --  35.0* 37.0* 33.0* 31.8*   BUN   --  17 17 19 18   CREATININE mg/dL  --  1.09 1.05 1.01 1.11   GLUCOSE mg/dL  --  119* 110* 119* 132*   ALBUMIN g/dL 4.30  --  4.30 4.00  --    BILIRUBIN mg/dL 1.2  --  0.9 0.9  --    ALK PHOS U/L 93  --  92 87  --    AST (SGOT) U/L 27 --  20 23  --    ALT (SGPT) U/L 12  --  10 11  --        TROPONIN  Results from last 7 days   Lab Units 07/08/20  1529   TROPONIN T ng/mL 0.032*       CoAg  Results from last 7 days   Lab Units 07/10/20  0451 07/09/20  0825 07/08/20  1142 07/06/20  1023   INR  1.25* 1.30* 1.30* 1.69*       ABG  Results from last 7 days   Lab Units 07/08/20  1117   PH, ARTERIAL pH units 7.361   PCO2, ARTERIAL mm Hg 58.7*   PO2 ART mm Hg 83.8   O2 SATURATION ART % 95.5   BASE EXCESS ART mmol/L 5.9*       Microbiology  Microbiology Results (last 10 days)     Procedure Component Value - Date/Time    COVID PRE-OP / PRE-PROCEDURE SCREENING ORDER (NO ISOLATION) - Swab, Nasopharynx [[id number]] Collected:  07/06/20 1023    Lab Status:  Final result Specimen:  Swab from Nasopharynx  Updated:  07/07/20 1556    Narrative:       The following orders were created for panel order COVID PRE-OP / PRE-PROCEDURE SCREENING ORDER (NO ISOLATION) - Swab, Nasopharynx.  Procedure                               Abnormality         Status                     ---------                               -----------         ------                     COVID-19,[facility], NP ...[[id number]]                      Final result                 Please view results for these tests on the individual orders.    COVID-19,[facility], NP SWAB IN LEXAR SALINE MEDIA 24-30 HR TAT - Swab, Nasopharynx [[id number]] Collected:  07/06/20 1023    Lab Status:  Final result Specimen:  Swab from Nasopharynx Updated:  07/07/20 1551     Reference Lab Report --     COVID19 Not Detected          IMAGING & OTHER STUDIES    Imaging Results (Last 72 Hours)     Procedure Component Value Units Date/Time    XR Chest PA & Lateral [[id number]] Collected:  07/08/20 1333     Updated:  07/08/20 1337    Narrative:       EXAMINATION: XR CHEST PA AND LATERAL-     DATE OF EXAM: 7/8/2020 12:30 PM     INDICATION: shortness of breath; I71.2-Thoracic aortic aneurysm, without  rupture; I48.0-Paroxysmal atrial fibrillation; K70.30-Alcoholic  cirrhosis of liver without ascites; I50.82-Biventricular heart failure.     COMPARISON: Chest radiograph dated 09/24/2019, chest CT dated  11/13/2019.     TECHNIQUE: PA and Lateral views of the chest were obtained.     FINDINGS:  There is stable cardiomegaly. There is aortic arch atherosclerotic  calcification. Pulmonary vascularity appears within normal limits. There  is emphysema within the upper lobes. There is biapical  pleural-parenchymal scarring. There is chronic bronchial wall thickening  and interstitial opacity within the left lower lobe. There is chronic  blunting of the right costophrenic angle which may relate to a trace  right effusion. There is an anterior wedging compression deformity which  appears correspond to the  T12 level.       Impression:       1. Chronic left lower lobe bronchial wall thickening with interstitial  opacity which may represent infection and bronchitis/bronchiolitis.  2. Chronic blunting of the right costophrenic angle likely related to a  trace effusion or scarring.  3. Upper lobe predominant emphysema with biapical pleural-parenchymal  scarring.     Electronically Signed By-Elbert Bruner On:7/8/2020 1:35 PM  This report was finalized on [id number] by  Elbert Bruner, .    XR Abdomen KUB [[id number]] Collected:  07/08/20 1332     Updated:  07/08/20 1335    Narrative:       DATE OF EXAM:  7/8/2020 12:30 PM     PROCEDURE:  XR ABDOMEN KUB-     INDICATIONS:  Abdominal distention, cirrhosis.     COMPARISON:  11/7/2019.     TECHNIQUE:   Single radiographic view of the abdomen was obtained.        FINDINGS:  The bowel gas pattern is unremarkable. There is no free air or  pneumatosis. There are abnormal basilar densities felt to represent  interstitial fibrosis and scarring, left greater than right side. There  are vascular calcifications within the abdomen and pelvis. The patient  has abdominal aortic stent graft in place. There are degenerative  changes of the thoracolumbar spine and sacroiliac joints.        Impression:       Unremarkable bowel gas pattern.     Electronically Signed By-Tim Baltazar On:7/8/2020 1:33 PM  This report was finalized on [id number] by  Tim Baltazar, .        Results for orders placed during the hospital encounter of 06/09/20   Adult Transthoracic Echo Complete W/ Cont if Necessary Per Protocol    Narrative · Estimated EF = 35%.  · Right ventricular cavity is severely dilated.  · Moderately reduced right ventricular systolic function noted.  · Left atrial cavity size is moderate-to-severely dilated.  · Mild mitral valve regurgitation is present  · Severe tricuspid valve regurgitation is present.  · The following left ventricular wall segments are hypokinetic: mid   anterior,  apical anterior, basal anterolateral, mid anterolateral, apical   lateral, basal inferolateral, mid inferolateral, apical inferior, mid   inferior, apical septal, basal inferoseptal, mid inferoseptal, apex   hypokinetic, mid anteroseptal, basal anterior, basal inferior and basal   inferoseptal.             ASSESSMENT/PLAN:     Systolic CHF, acute (CMS/HCC)    Ascending aortic aneurysm (CMS/HCC)    PAF (paroxysmal atrial fibrillation) (CMS/HCC)    COPD mixed type (CMS/HCC)    Alcoholic cirrhosis of liver without ascites (CMS/HCC)    Biventricular congestive heart failure (CMS/HCC)    RLS (restless legs syndrome)    DM (diabetes mellitus) (CMS/HCC)    Anxiety disorder    Hypomagnesemia    Abdominal distention  Pulmonary hypertension.  Recent 2D echo with EF 35%.  RVSP greater than 55 mmHg  COPD  Obstructive sleep apnea with inability to tolerate CPAP.  Chronic hypoxemic respiratory failure.  Patient uses 2 L supplemental oxygen at night at home.  Restless leg syndrome  History of cirrhosis  Diabetes  Acute on chronic systolic congestive heart failure exacerbation  PLAN    I believe his pulmonary hypertension is likely WHO group 2 and 3 related to his left heart dysfunction, untreated sleep apnea and COPD.  We will continue to monitor further recommendations depending on his progress  We will continue with supportive therapy.  Cardiology following for OPAL and right and left heart catheterization.  Cardiology deciding about the timing  We will reevaluate need for supplemental oxygen at discharge.  Patient is currently on 2 L nasal cannula.  He may need supplemental oxygen during the day at home.  He has been using only supplemental oxygen at night at home  For COPD will continue with budesonide and nebs.  He uses Combivent at home.  He remains on Requip which has been adjusted.  Continue to monitor.  Remains on IV diuresis.  Monitor renal function which is stable  We will continue with rest of therapy he is on  therapeutic Lovenox     THIS DOCUMENT HAS BEEN ELECTRONICALLY SIGNED BY  London Jones MD  11:44 AM

## 2020-07-10 NOTE — ANESTHESIA PREPROCEDURE EVALUATION
Anesthesia Evaluation     Patient summary reviewed and Nursing notes reviewed   no history of anesthetic complications:  NPO Solid Status: > 8 hours  NPO Liquid Status: > 8 hours           Airway   Mallampati: II  TM distance: >3 FB  Neck ROM: full  No difficulty expected  Dental      Pulmonary     breath sounds clear to auscultation  (+) COPD severe, home oxygen, sleep apnea,   Cardiovascular     ECG reviewed  Rhythm: regular  Rate: normal    (+) dysrhythmias Paroxysmal Atrial Fib, CHF ,       Neuro/Psych  (+) psychiatric history Anxiety and Depression,     GI/Hepatic/Renal/Endo    (+)   liver disease, diabetes mellitus,     Musculoskeletal (-) negative ROS    Abdominal     Abdomen: soft.   Substance History - negative use     OB/GYN negative ob/gyn ROS         Other - negative ROS                     Anesthesia Plan    ASA 4     MAC     intravenous induction     Anesthetic plan, all risks, benefits, and alternatives have been provided, discussed and informed consent has been obtained with: patient.

## 2020-07-10 NOTE — PLAN OF CARE
Problem: Patient Care Overview  Goal: Plan of Care Review  Outcome: Ongoing (interventions implemented as appropriate)  Flowsheets (Taken 7/10/2020 1412)  Outcome Summary: 80 y/o M who presented for cardiac cath that was delayed due to SOA and CHF exacerbation. Cardiac cath is now planned for this PM. Patient is mod I at baseline- he intermittently uses STC vs rollator and typically does not need supp O2 during the day, only at night but does have portable O2. He lives in 3 generational household so family helps with iADLs. He reports one fall this year- he tripped in his home. He does not have to negotiate stairs at home. He appears to be near baseline function but does have higher O2 demands. Will need 6 min walk test and will continue to follow for PT treatment while IP to ensure continued recovery following cardiac cath. No anticipated d/c needs. PPE: mask, face shield, gloves.

## 2020-07-10 NOTE — THERAPY EVALUATION
Patient Name: Giles Frost  : 1940    MRN: [id number]                              Today's Date: 7/10/2020       Admit Date: 2020    Visit Dx:     ICD-10-CM ICD-9-CM   1. Systolic CHF, acute (CMS/HCC) I50.21 428.21     428.0   2. Ascending aortic aneurysm (CMS/HCC) I71.2 441.2   3. PAF (paroxysmal atrial fibrillation) (CMS/HCC) I48.0 427.31   4. Alcoholic cirrhosis of liver without ascites (CMS/HCC) K70.30 571.2   5. Biventricular congestive heart failure (CMS/HCC) I50.82 428.0     Patient Active Problem List   Diagnosis   • Ascending aortic aneurysm (CMS/HCC)   • Incidental lung nodule, > 3mm and < 8mm   • PAF (paroxysmal atrial fibrillation) (CMS/HCC)   • COPD mixed type (CMS/HCC)   • Abdominal aortic aneurysm (AAA) without rupture (CMS/HCC)   • Non-rheumatic tricuspid valve insufficiency   • Acute abdominal pain   • Alcoholic cirrhosis of liver without ascites (CMS/HCC)   • AAA (abdominal aortic aneurysm) without rupture (CMS/HCC)   • Atrial fibrillation (CMS/HCC)   • Biventricular congestive heart failure (CMS/HCC)   • RLS (restless legs syndrome)   • DM (diabetes mellitus) (CMS/HCC)   • Anxiety disorder   • Hypomagnesemia   • Abdominal distention   • Systolic CHF, acute (CMS/HCC)     Past Medical History:   Diagnosis Date   • AAA (abdominal aortic aneurysm) (CMS/HCC)    • Alcohol abuse     Hx   • Alcoholic cirrhosis (CMS/HCC)    • Ankle edema, bilateral     at times   • Anxiety     Welbutrin   • Anxiety    • Aortic aneurysm (CMS/HCC) 2018    Ascending Measuring 4.7CM Noted on CT Chest   • Atrial fibrillation (CMS/HCC)    • Cardiomegaly 2018    CT Chest   • Centrilobular emphysema (CMS/HCC) 2018    On CT Chest   • CHF (congestive heart failure) (CMS/HCC)    • Cholecystolithiasis 2018    On CT Chest   • Chronic liver disease    • COPD (chronic obstructive pulmonary disease) (CMS/HCC)    • Depression    • Descending thoracic aortic aneurysm (CMS/HCC) 2018    On Chest  CT-4.2CM Near Diaphragmatic Hiatus and 4.8CM at Main Pulmonary Artery   • Diabetes mellitus (CMS/HCC)    • Diarrhea    • History of echocardiogram 12/20/16 & 8/2/17-[facility]   • History of EKG 12/2016    Atrial Fib   • Hx of chest x-ray 12/18/2016    1 V   • Hx of chest x-ray 08/2011   • Hx of CT scan of chest 09/19/18[facility]    Enlarged Heart Size; Coronary Artery and Aortic Atherosclerotic Calcifications; Ascending Aorta Measuring 4.7CM;    • Long term current use of anticoagulant    • On home oxygen therapy     2 LPM QHS   • Paraseptal emphysema (CMS/HCC) 09/19/2018    On CT Chest   • Pleural nodule 09/19/2018    On CT Chest-6MM   • Pneumonia 06/2019    Hx   • Pulmonary hypertension (CMS/HCC)    • Pulmonary nodules 09/19/2018    R on CT Chest   • Respiratory failure (CMS/HCC) Hypoxic   • Sleep apnea     uses only O2 QHS     Past Surgical History:   Procedure Laterality Date   • ABDOMINAL AORTIC ANEURYSM REPAIR WITH ENDOGRAFT N/A 9/23/2019    Procedure: ENDOVASCULAR AORTIC ANEURYSM REPAIR WITH GORE (EVAR);  Surgeon: Dewayne Victoria MD;  Location: [facility] OR;  Service: Vascular   • BRONCHOSCOPY  1/12/17-[facility]    Dr. Moreno   • CATARACT EXTRACTION, BILATERAL     • COLONOSCOPY  08/23/2011    w/EGD-Dr. Tse   • COLONOSCOPY  10/2006    w/EGD-Diony Moy MD   • TONSILLECTOMY       General Information     Row Name 07/10/20 1404          PT Evaluation Time/Intention    Document Type  evaluation  -SS     Mode of Treatment  physical therapy  -     Row Name 07/10/20 1410 07/10/20 1404       General Information    Patient Profile Reviewed?  -- intermittently uses rollator and STC  -SS  yes  -SS    Prior Level of Function  --  independent:;ADL's does not drive since coronavirus but did previously. thinks he is needing more oxygen since hes been more sedentary with staying at home.   -SS    Existing Precautions/Restrictions  --  oxygen therapy device and L/min 2.5 L at rest currently- typically only sleeps with O2   -    Row Name 07/10/20 1404          Relationship/Environment    Lives With  spouse;child(robyn), adult;grandchild(robyn) lives with his spouse, their daughter, son in law, and grandson.   -SS     Row Name 07/10/20 1404          Resource/Environmental Concerns    Current Living Arrangements  home/apartment/condo  -SS     Row Name 07/10/20 1404          Home Main Entrance    Number of Stairs, Main Entrance  none  -SS     Row Name 07/10/20 1404          Stairs Within Home, Primary    Number of Stairs, Within Home, Primary  none  -SS     Row Name 07/10/20 1404          Cognitive Assessment/Intervention- PT/OT    Orientation Status (Cognition)  oriented x 4  -SS       User Key  (r) = Recorded By, (t) = Taken By, (c) = Cosigned By    Initials Name Provider Type    Marlena Black PT Physical Therapist        Mobility     Row Name 07/10/20 1406          Sit-Stand Transfer    Sit-Stand [place] (Transfers)  supervision;conditional independence  -     Assistive Device (Sit-Stand Transfers)  walker, 4-wheeled  -     Row Name 07/10/20 1410 07/10/20 1406       Gait/Stairs Assessment/Training    [place] Level (Gait)  --  supervision;conditional independence  -    Assistive Device (Gait)  --  walker, 4-wheeled vs no AD  -SS    Distance in Feet (Gait)  --  20' x 2  -SS    Comment (Gait/Stairs)  pt demonstrates safety with and without rollator walker  -SS  kyphotic posture, decreased step length bilaterally   -      User Key  (r) = Recorded By, (t) = Taken By, (c) = Cosigned By    Initials Name Provider Type    Marlena Black PT Physical Therapist        Obj/Interventions     Row Name 07/10/20 1411          General ROM    GENERAL ROM COMMENTS  B LE WFL  -     Row Name 07/10/20 1411          MMT (Manual Muscle Testing)    General MMT Comments  B LW >3/5 per functional assessment  -     Row Name 07/10/20 1411          Static Sitting Balance    Level of [place] (Unsupported Sitting, Static  Balance)  independent  -SS     Row Name 07/10/20 1411          Static Standing Balance    Level of [place] (Supported Standing, Static Balance)  conditional independence  -SS     Row Name 07/10/20 1411          Sensory Assessment/Intervention    Sensory General Assessment  no sensation deficits identified  -SS       User Key  (r) = Recorded By, (t) = Taken By, (c) = Cosigned By    Initials Name Provider Type    SS Marlena Schaeffer, PT Physical Therapist        Goals/Plan     Row Name 07/10/20 1419          Gait Training Goal 1 (PT)    Activity/Assistive Device (Gait Training Goal 1, PT)  gait (walking locomotion)  -SS     [place] Level (Gait Training Goal 1, PT)  conditional independence  -SS     Distance (Gait Goal 1, PT)  75'  -SS     Time Frame (Gait Training Goal 1, PT)  long term goal (LTG);2 weeks  -SS       User Key  (r) = Recorded By, (t) = Taken By, (c) = Cosigned By    Initials Name Provider Type    SS Marlena Schaeffer, PT Physical Therapist        Clinical Impression     Row Name 07/10/20 1412          Pain Assessment    Additional Documentation  Pain Scale: FACES Pre/Post-Treatment (Group)  -SS     Row Name 07/10/20 1412          Pain Scale: FACES Pre/Post-Treatment    Pain: FACES Scale, Pretreatment  0-->no hurt  -SS     Pain: FACES Scale, Post-Treatment  0-->no hurt  -SS     Row Name 07/10/20 1412          Plan of Care Review    Plan of Care Reviewed With  patient  -SS     Outcome Summary  80 y/o M who presented for cardiac cath that was delayed due to SOA and CHF exacerbation. Cardiac cath is now planned for this PM. Patient is mod I at baseline- he intermittently uses STC vs rollator and typically does not need supp O2 during the day, only at night but does have portable O2. He lives in 3 generational household so family helps with iADLs. He reports one fall this year- he tripped in his home. He does not have to negotiate stairs at home. He appears to be near baseline function but  does have higher O2 demands. Will need 6 min walk test and will continue to follow for PT treatment while IP to ensure continued recovery following cardiac cath. No anticipated d/c needs. PPE: mask, face shield, gloves.   -     Row Name 07/10/20 1412          Physical Therapy Clinical Impression    Criteria for Skilled Interventions Met (PT Clinical Impression)  yes;treatment indicated  -     Rehab Potential (PT Clinical Summary)  good, to achieve stated therapy goals  -     Predicted Duration of Therapy (PT)  until d/c  -       User Key  (r) = Recorded By, (t) = Taken By, (c) = Cosigned By    Initials Name Provider Type     Marlena Schaeffer, PT Physical Therapist        Outcome Measures    No documentation.       Physical Therapy Education                 Title: PT OT SLP Therapies (Done)     Topic: Physical Therapy (Done)     Point: Mobility training (Done)     Description:   Instruct learner(s) on safety and technique for assisting patient out of bed, chair or wheelchair.  Instruct in the proper use of assistive devices, such as walker, crutches, cane or brace.              Patient Friendly Description:   It's important to get you on your feet again, but we need to do so in a way that is safe for you. Falling has serious consequences, and your personal safety is the most important thing of all.        When it's time to get out of bed, one of us or a family member will sit next to you on the bed to give you support.     If your doctor or nurse tells you to use a walker, crutches, a cane, or a brace, be sure you use it every time you get out of bed, even if you think you don't need it.    Learning Progress Summary           Patient Acceptance, E, VU by  at 7/10/2020 1420                               User Key     Initials Effective Dates Name Provider Type Discipline     06/19/19 -  Malrena Schaeffer PT Physical Therapist PT              PT Recommendation and Plan  Planned Therapy Interventions  (PT Eval): balance training, patient/family education, gait training  Outcome Summary/Treatment Plan (PT)  Anticipated Discharge Disposition (PT): home with assist  Plan of Care Reviewed With: patient  Outcome Summary: 80 y/o M who presented for cardiac cath that was delayed due to SOA and CHF exacerbation. Cardiac cath is now planned for this PM. Patient is mod I at baseline- he intermittently uses STC vs rollator and typically does not need supp O2 during the day, only at night but does have portable O2. He lives in 3 generational household so family helps with iADLs. He reports one fall this year- he tripped in his home. He does not have to negotiate stairs at home. He appears to be near baseline function but does have higher O2 demands. Will need 6 min walk test and will continue to follow for PT treatment while IP to ensure continued recovery following cardiac cath. No anticipated d/c needs. PPE: mask, face shield, gloves.      Time Calculation:   PT Charges     Row Name 07/10/20 1421             Time Calculation    Start Time  1115  -SS      Stop Time  1131  -SS      Time Calculation (min)  16 min  -      PT Received On  07/10/20  -      PT - Next Appointment  07/12/20  -      PT Goal Re-Cert Due Date  07/24/20  -         Time Calculation- PT    Total Timed Code Minutes- PT  0 minute(s)  -        User Key  (r) = Recorded By, (t) = Taken By, (c) = Cosigned By    Initials Name Provider Type    SS Marlena Schaeffer, PT Physical Therapist        Therapy Charges for Today     Code Description Service Date Service Provider Modifiers Qty    15313729493 HC PT EVAL MOD COMPLEXITY 3 7/10/2020 Marlena Schaeffer, PT GP 1               Marlena Schaeffer PT  7/10/2020

## 2020-07-10 NOTE — PLAN OF CARE
Patient on 2L Nasal cannula was sitting up in chair. Lungs clear to auscultation. No shortness of breath. No audible crackles.  Pt waiting for transport to take to [facility] for [facility] with cardioversion and after that procedure plans for heart catheterization.  Patient appears restless, waiting anxiously to have procedures done.

## 2020-07-10 NOTE — PROGRESS NOTES
Reason for follow-up: Severe cardiomyopathy  Acute congestive heart failure   Atrial fibrillation  Ascending aortic aneurysm     Patient Care Team:  Valeriano Lewis MD as PCP - General  Qamar Lau MD (Cardiology)    Subjective .   Feeling better     Review of Systems   Constitution: Positive for malaise/fatigue. Negative for fever.   HENT: Negative for congestion and hearing loss.    Eyes: Negative for double vision and visual disturbance.   Cardiovascular: Positive for dyspnea on exertion, orthopnea and paroxysmal nocturnal dyspnea. Negative for chest pain, claudication, leg swelling and syncope.   Respiratory: Positive for shortness of breath. Negative for cough.    Endocrine: Negative for cold intolerance.   Skin: Negative for color change and rash.   Musculoskeletal: Negative for arthritis and joint pain.   Gastrointestinal: Negative for abdominal pain and heartburn.   Genitourinary: Negative for hematuria.   Neurological: Negative for excessive daytime sleepiness and dizziness.   Psychiatric/Behavioral: Negative for depression. The patient is not nervous/anxious.    All other systems reviewed and are negative.      Gabapentin    Scheduled Meds:    [MAR Hold] budesonide 0.5 mg Nebulization BID - RT   [MAR Hold] digoxin 125 mcg Oral QAM   dilTIAZem 15 mg Oral BID   [MAR Hold] enoxaparin 80 mg Subcutaneous Q12H   [MAR Hold] furosemide 40 mg Intravenous BID   [MAR Hold] insulin lispro 0-9 Units Subcutaneous TID AC   [MAR Hold] ipratropium-albuterol 3 mL Nebulization 4x Daily - RT   [MAR Hold] rOPINIRole 0.5 mg Oral 3 times per day   [MAR Hold] sodium chloride 10 mL Intravenous Q12H     Continuous Infusions:    Pharmacy to Dose enoxaparin (LOVENOX)     sodium chloride 30 mL/hr Last Rate: 30 mL/hr (07/10/20 1518)     PRN Meds:.•  [MAR Hold] acetaminophen **OR** [MAR Hold] acetaminophen **OR** [MAR Hold] acetaminophen  •  [MAR Hold] ALPRAZolam  •  [MAR Hold] dextrose  •  [MAR Hold] dextrose  •   "[MAR Hold] glucagon (human recombinant)  •  [MAR Hold] insulin lispro **AND** [MAR Hold] insulin lispro  •  [MAR Hold] ipratropium-albuterol  •  [MAR Hold] magnesium sulfate **OR** [MAR Hold] magnesium sulfate in D5W 1g/100mL (PREMIX)  •  [MAR Hold] ondansetron **OR** [MAR Hold] ondansetron  •  Pharmacy to Dose enoxaparin (LOVENOX)  •  [MAR Hold] potassium chloride  •  [MAR Hold] sodium chloride    Objective   Looks in mild distress    VITAL SIGNS  Vitals:    07/10/20 1750 07/10/20 1800 07/10/20 1815 07/10/20 1830   BP: 125/74 123/68 126/66 128/89   BP Location:       Patient Position:       Pulse: 77 74 74 71   Resp:       Temp:       TempSrc:       SpO2: 99% 100% 98% 97%   Weight:       Height:           Flowsheet Rows      First Filed Value   Admission Height  163.8 cm (64.5\") Documented at 07/08/2020 0825   Admission Weight  76.1 kg (167 lb 12.3 oz) Documented at 07/08/2020 0825           TELEMETRY: Atrial fibrillation    Physical Exam:  Physical Exam   Constitutional: He is oriented to person, place, and time. He appears well-developed and well-nourished. He is cooperative.   HENT:   Head: Normocephalic and atraumatic.   Mouth/Throat: Uvula is midline and oropharynx is clear and moist. No oral lesions.   Eyes: Conjunctivae are normal. No scleral icterus.   Neck: Trachea normal. Neck supple. No JVD present. Carotid bruit is not present. No thyromegaly present.   Cardiovascular: Normal rate, S1 normal, S2 normal, normal heart sounds, intact distal pulses and normal pulses. An irregularly irregular rhythm present. PMI is not displaced. Exam reveals no gallop and no friction rub.   No murmur heard.  Pulmonary/Chest: Effort normal. He has rales.   Abdominal: Soft. Bowel sounds are normal.   Musculoskeletal: Normal range of motion.   Neurological: He is alert and oriented to person, place, and time. He has normal strength.   No focal deficits   Skin: Skin is warm. No cyanosis.   Psychiatric: He has a normal mood and " affect.                LAB RESULTS (LAST 7 DAYS)    CBC  Results from last 7 days   Lab Units 07/10/20  0451 07/09/20  0336 07/08/20  1142 07/06/20  1023   WBC 10*3/mm3 8.00 7.00 6.60 7.47   RBC 10*6/mm3 4.25 4.29 4.17 4.23   HEMOGLOBIN g/dL 13.0 13.2 12.9* 13.0   HEMATOCRIT % 40.3 40.2 39.3 39.7   MCV fL 94.8 93.8 94.3 93.9   PLATELETS 10*3/mm3 254 218 207 223       BMP  Results from last 7 days   Lab Units 07/10/20  0451 07/09/20  0336 07/08/20  1142 07/08/20  1018 07/06/20  1023   SODIUM mmol/L 138 142  --  139 138   POTASSIUM mmol/L 3.4* 3.7  --  3.9 4.2   CHLORIDE mmol/L 91* 94*  --  95* 94*   CO2 mmol/L 35.0* 37.0*  --  33.0* 31.8*   BUN  17 17  --  19 18   CREATININE mg/dL 1.09 1.05  --  1.01 1.11   GLUCOSE mg/dL 119* 110*  --  119* 132*   MAGNESIUM mg/dL 1.8  --  1.9  --   --        CMP   Results from last 7 days   Lab Units 07/10/20  0842 07/10/20  0451 07/09/20  0336 07/08/20  1018 07/06/20  1023   SODIUM mmol/L  --  138 142 139 138   POTASSIUM mmol/L  --  3.4* 3.7 3.9 4.2   CHLORIDE mmol/L  --  91* 94* 95* 94*   CO2 mmol/L  --  35.0* 37.0* 33.0* 31.8*   BUN   --  17 17 19 18   CREATININE mg/dL  --  1.09 1.05 1.01 1.11   GLUCOSE mg/dL  --  119* 110* 119* 132*   ALBUMIN g/dL 4.30  --  4.30 4.00  --    BILIRUBIN mg/dL 1.2  --  0.9 0.9  --    ALK PHOS U/L 93  --  92 87  --    AST (SGOT) U/L 27  --  20 23  --    ALT (SGPT) U/L 12  --  10 11  --          BNP        TROPONIN  Results from last 7 days   Lab Units 07/08/20  1529   TROPONIN T ng/mL 0.032*       CoAg  Results from last 7 days   Lab Units 07/10/20  0451 07/09/20  0825 07/08/20  1142 07/06/20  1023   INR  1.25* 1.30* 1.30* 1.69*       Creatinine Clearance  Estimated Creatinine Clearance: 52 mL/min (by C-G formula based on SCr of 1.09 mg/dL).    ABG  Results from last 7 days   Lab Units 07/08/20  1117   PH, ARTERIAL pH units 7.361   PCO2, ARTERIAL mm Hg 58.7*   PO2 ART mm Hg 83.8   O2 SATURATION ART % 95.5   BASE EXCESS ART mmol/L 5.9*        Radiology  No radiology results for the last day          EKG        I personally viewed and interpreted the patient's EKG/Telemetry data:    ECHOCARDIOGRAM:    Results for orders placed during the hospital encounter of 06/09/20   Adult Transthoracic Echo Complete W/ Cont if Necessary Per Protocol    Narrative · Estimated EF = 35%.  · Right ventricular cavity is severely dilated.  · Moderately reduced right ventricular systolic function noted.  · Left atrial cavity size is moderate-to-severely dilated.  · Mild mitral valve regurgitation is present  · Severe tricuspid valve regurgitation is present.  · The following left ventricular wall segments are hypokinetic: mid   anterior, apical anterior, basal anterolateral, mid anterolateral, apical   lateral, basal inferolateral, mid inferolateral, apical inferior, mid   inferior, apical septal, basal inferoseptal, mid inferoseptal, apex   hypokinetic, mid anteroseptal, basal anterior, basal inferior and basal   inferoseptal.        STRESS MYOVIEW:    CARDIAC CATHETERIZATION:    OTHER:         Assessment/Plan       Systolic CHF, acute (CMS/HCC)    Biventricular congestive heart failure (CMS/HCC)    Ascending aortic aneurysm (CMS/HCC)    PAF (paroxysmal atrial fibrillation) (CMS/HCC)    COPD mixed type (CMS/HCC)    Alcoholic cirrhosis of liver without ascites (CMS/HCC)    RLS (restless legs syndrome)    DM (diabetes mellitus) (CMS/HCC)    Anxiety disorder    Hypomagnesemia    Abdominal distention      Most probably acute systolic congestive heart failure  Right heart failure  Pulmonary also following the patient  A transesophageal echocardiogram showed severe tricuspid insufficiency RV failure right atrium significantly enlarged , LV systolic dysfunction  Ascending aortic aneurysm followed by CV surgery  Aggressive control of diabetes  Resume anticoagulation with Lovenox  Patient cardiac cath showed multivessel CAD I asked for surgical evaluation  I discussed the  patients findings and my recommendations with patient and attending nurse    Qamar Lau MD  07/10/20  18:55

## 2020-07-11 LAB
BH CV ECHO MEAS - BSA(HAYCOCK): 1.9 M^2
BH CV ECHO MEAS - BSA: 1.9 M^2
BH CV ECHO MEAS - BZI_BMI: 24 KILOGRAMS/M^2
BH CV ECHO MEAS - BZI_METRIC_HEIGHT: 177.8 CM
BH CV ECHO MEAS - BZI_METRIC_WEIGHT: 75.7 KG
GLUCOSE BLDC GLUCOMTR-MCNC: 122 MG/DL (ref 70–105)
GLUCOSE BLDC GLUCOMTR-MCNC: 141 MG/DL (ref 70–105)
GLUCOSE BLDC GLUCOMTR-MCNC: 157 MG/DL (ref 70–105)
GLUCOSE BLDC GLUCOMTR-MCNC: 184 MG/DL (ref 70–105)
INR PPP: 1.23 (ref 2–3)
LV EF 2D ECHO EST: 40 %
MAGNESIUM SERPL-MCNC: 1.8 MG/DL (ref 1.6–2.4)
POTASSIUM SERPL-SCNC: 3.5 MMOL/L (ref 3.5–5.2)
PROTHROMBIN TIME: 12.5 SECONDS (ref 19.4–28.5)

## 2020-07-11 PROCEDURE — 93325 DOPPLER ECHO COLOR FLOW MAPG: CPT | Performed by: INTERNAL MEDICINE

## 2020-07-11 PROCEDURE — 63710000001 INSULIN LISPRO (HUMAN) PER 5 UNITS: Performed by: INTERNAL MEDICINE

## 2020-07-11 PROCEDURE — 94799 UNLISTED PULMONARY SVC/PX: CPT

## 2020-07-11 PROCEDURE — 84132 ASSAY OF SERUM POTASSIUM: CPT | Performed by: INTERNAL MEDICINE

## 2020-07-11 PROCEDURE — 99232 SBSQ HOSP IP/OBS MODERATE 35: CPT | Performed by: INTERNAL MEDICINE

## 2020-07-11 PROCEDURE — 25010000002 FUROSEMIDE PER 20 MG: Performed by: INTERNAL MEDICINE

## 2020-07-11 PROCEDURE — 85610 PROTHROMBIN TIME: CPT | Performed by: INTERNAL MEDICINE

## 2020-07-11 PROCEDURE — 93320 DOPPLER ECHO COMPLETE: CPT | Performed by: INTERNAL MEDICINE

## 2020-07-11 PROCEDURE — 93312 ECHO TRANSESOPHAGEAL: CPT | Performed by: INTERNAL MEDICINE

## 2020-07-11 PROCEDURE — 82962 GLUCOSE BLOOD TEST: CPT

## 2020-07-11 PROCEDURE — 83735 ASSAY OF MAGNESIUM: CPT | Performed by: INTERNAL MEDICINE

## 2020-07-11 PROCEDURE — 99231 SBSQ HOSP IP/OBS SF/LOW 25: CPT | Performed by: HOSPITALIST

## 2020-07-11 RX ORDER — WARFARIN SODIUM 3 MG/1
3 TABLET ORAL
Status: DISCONTINUED | OUTPATIENT
Start: 2020-07-11 | End: 2020-07-12 | Stop reason: HOSPADM

## 2020-07-11 RX ORDER — FUROSEMIDE 40 MG/1
80 TABLET ORAL
Status: DISCONTINUED | OUTPATIENT
Start: 2020-07-12 | End: 2020-07-12 | Stop reason: HOSPADM

## 2020-07-11 RX ORDER — WARFARIN SODIUM 3 MG/1
1.5 TABLET ORAL
Status: DISCONTINUED | OUTPATIENT
Start: 2020-07-13 | End: 2020-07-12 | Stop reason: HOSPADM

## 2020-07-11 RX ADMIN — DILTIAZEM HYDROCHLORIDE 15 MG: 30 TABLET ORAL at 08:00

## 2020-07-11 RX ADMIN — IPRATROPIUM BROMIDE AND ALBUTEROL SULFATE 3 ML: 2.5; .5 SOLUTION RESPIRATORY (INHALATION) at 07:15

## 2020-07-11 RX ADMIN — BUDESONIDE 0.5 MG: 0.5 INHALANT RESPIRATORY (INHALATION) at 18:07

## 2020-07-11 RX ADMIN — BUDESONIDE 0.5 MG: 0.5 INHALANT RESPIRATORY (INHALATION) at 07:15

## 2020-07-11 RX ADMIN — FUROSEMIDE 40 MG: 10 INJECTION, SOLUTION INTRAMUSCULAR; INTRAVENOUS at 19:01

## 2020-07-11 RX ADMIN — DIGOXIN 125 MCG: 0.12 TABLET ORAL at 12:26

## 2020-07-11 RX ADMIN — IPRATROPIUM BROMIDE AND ALBUTEROL SULFATE 3 ML: 2.5; .5 SOLUTION RESPIRATORY (INHALATION) at 18:07

## 2020-07-11 RX ADMIN — ROPINIROLE 0.5 MG: 0.25 TABLET, FILM COATED ORAL at 07:57

## 2020-07-11 RX ADMIN — IPRATROPIUM BROMIDE AND ALBUTEROL SULFATE 3 ML: 2.5; .5 SOLUTION RESPIRATORY (INHALATION) at 11:09

## 2020-07-11 RX ADMIN — Medication 10 ML: at 08:01

## 2020-07-11 RX ADMIN — ROPINIROLE 0.5 MG: 0.25 TABLET, FILM COATED ORAL at 19:00

## 2020-07-11 RX ADMIN — WARFARIN 3 MG: 3 TABLET ORAL at 19:00

## 2020-07-11 RX ADMIN — DILTIAZEM HYDROCHLORIDE 15 MG: 30 TABLET ORAL at 21:42

## 2020-07-11 RX ADMIN — INSULIN LISPRO 2 UNITS: 100 INJECTION, SOLUTION INTRAVENOUS; SUBCUTANEOUS at 19:00

## 2020-07-11 RX ADMIN — ROPINIROLE 0.5 MG: 0.25 TABLET, FILM COATED ORAL at 12:30

## 2020-07-11 RX ADMIN — ALPRAZOLAM 0.5 MG: 0.5 TABLET ORAL at 02:51

## 2020-07-11 RX ADMIN — Medication 10 ML: at 21:00

## 2020-07-11 RX ADMIN — IPRATROPIUM BROMIDE AND ALBUTEROL SULFATE 3 ML: 2.5; .5 SOLUTION RESPIRATORY (INHALATION) at 14:59

## 2020-07-11 RX ADMIN — FUROSEMIDE 40 MG: 10 INJECTION, SOLUTION INTRAMUSCULAR; INTRAVENOUS at 08:00

## 2020-07-11 NOTE — NURSING NOTE
Placed order for warafin for pharmacy to dose. Spoke with Dr. Gramajo (cardiologist), Charge nurse, along with pharmacy and placed order for pharmacy to dose warafin order in.  Dr. Gramajo also notes this in his notes for 7/11/2020

## 2020-07-11 NOTE — PROGRESS NOTES
"Pharmacy dosing service  Anticoagulant  Warfarin     Subjective:    Giles Frost is a 79 y.o.male being continued on warfarin for atrial fibrillation.    INR Goal: 2 - 3  Home medication?: Yes, warfarin 1.5 mg PO on MWF and warfarin 3 mg PO on TuThFrSaSu (last night's dose, 1.5 mg)  Bridge Therapy Present?:  No  Interacting Medications Evaluation (New/Present/Discontinued): none  Additional Contributing Factors: none      Assessment/Plan:    Patient on home warfarin of 1.5 mg MWF and 3 mg all other nights. Will continue with this home regimen for now.     Continue to monitor and adjust based on INR.         Date 7/11           INR 1.23           Dose 3mg               Objective:  [Ht: 163.8 cm (64.5\"); Wt: 72 kg (158 lb 11.7 oz); BMI: Body mass index is 26.83 kg/m².]    Lab Results   Component Value Date    LABPROT 29.2 (H) 07/03/2019    ALBUMIN 4.30 07/10/2020     Lab Results   Component Value Date    INR 1.23 (L) 07/11/2020    INR 1.25 (L) 07/10/2020    INR 1.30 (L) 07/09/2020    PROTIME 12.5 (L) 07/11/2020    PROTIME 12.6 (L) 07/10/2020    PROTIME 12.8 (L) 07/09/2020     Lab Results   Component Value Date    HGB 13.0 07/10/2020    HGB 13.2 07/09/2020    HGB 12.9 (L) 07/08/2020     Lab Results   Component Value Date    HCT 40.3 07/10/2020    HCT 40.2 07/09/2020    HCT 39.3 07/08/2020       Uriel Cerna, [facility]  07/11/20 14:36     "

## 2020-07-11 NOTE — SIGNIFICANT NOTE
This note also relates to the following rows which could not be included:  SpO2 - Cannot attach notes to unvalidated device data  Heart Rate - Cannot attach notes to unvalidated device data    Patient already has home 02 at 2 liters and states he wears at night and sometimes during the day if he feels like he needs it.  Exercise oximetry was not done, Rosie Gooden notified and is going to make MD aware of home 02.

## 2020-07-11 NOTE — PROGRESS NOTES
Reason for follow-up: Severe cardiomyopathy  Acute congestive heart failure   Atrial fibrillation  Ascending aortic aneurysm  Multivessel CAD  Patient Care Team:  Valeriano Lewis MD as PCP - General  Qamar Lau MD (Cardiology)    Subjective .   Feeling better     Review of Systems   Constitution: Positive for malaise/fatigue. Negative for fever.   HENT: Negative for congestion and hearing loss.    Eyes: Negative for double vision and visual disturbance.   Cardiovascular: Positive for dyspnea on exertion, orthopnea and paroxysmal nocturnal dyspnea. Negative for chest pain, claudication, leg swelling and syncope.   Respiratory: Positive for shortness of breath. Negative for cough.    Endocrine: Negative for cold intolerance.   Skin: Negative for color change and rash.   Musculoskeletal: Negative for arthritis and joint pain.   Gastrointestinal: Negative for abdominal pain and heartburn.   Genitourinary: Negative for hematuria.   Neurological: Negative for excessive daytime sleepiness and dizziness.   Psychiatric/Behavioral: Negative for depression. The patient is not nervous/anxious.    All other systems reviewed and are negative.      Gabapentin    Scheduled Meds:    budesonide 0.5 mg Nebulization BID - RT   digoxin 125 mcg Oral QAM   dilTIAZem 15 mg Oral BID   furosemide 40 mg Intravenous BID   insulin lispro 0-9 Units Subcutaneous TID AC   ipratropium-albuterol 3 mL Nebulization 4x Daily - RT   rOPINIRole 0.5 mg Oral 3 times per day   sodium chloride 10 mL Intravenous Q12H     Continuous Infusions:    Pharmacy to Dose enoxaparin (LOVENOX)     sodium chloride 30 mL/hr Last Rate: 30 mL/hr (07/10/20 7767)     PRN Meds:.•  [DISCONTINUED] acetaminophen **OR** acetaminophen **OR** acetaminophen  •  acetaminophen  •  ALPRAZolam  •  aluminum-magnesium hydroxide-simethicone  •  atropine  •  dextrose  •  dextrose  •  diphenhydrAMINE  •  glucagon (human recombinant)  •  insulin lispro **AND** insulin  "lispro  •  ipratropium-albuterol  •  magnesium sulfate **OR** magnesium sulfate in D5W 1g/100mL (PREMIX)  •  ondansetron **OR** ondansetron  •  ondansetron **OR** ondansetron  •  Pharmacy to Dose enoxaparin (LOVENOX)  •  potassium chloride  •  sodium chloride  •  sodium chloride    Objective   Looks in mild distress    VITAL SIGNS  Vitals:    07/11/20 1035 07/11/20 1109 07/11/20 1116 07/11/20 1226   BP: 116/57   109/66   BP Location: Right arm      Patient Position: Sitting      Pulse: 77 72 81 86   Resp: 19 20 20    Temp: 97.5 °F (36.4 °C)      TempSrc: Oral      SpO2: 98% 94% (!) 89%    Weight:       Height:           Flowsheet Rows      First Filed Value   Admission Height  163.8 cm (64.5\") Documented at 07/08/2020 0825   Admission Weight  76.1 kg (167 lb 12.3 oz) Documented at 07/08/2020 0825           TELEMETRY: Atrial fibrillation    Physical Exam:  Physical Exam   Constitutional: He is oriented to person, place, and time. He appears well-developed and well-nourished. He is cooperative.   HENT:   Head: Normocephalic and atraumatic.   Mouth/Throat: Uvula is midline and oropharynx is clear and moist. No oral lesions.   Eyes: Conjunctivae are normal. No scleral icterus.   Neck: Trachea normal. Neck supple. No JVD present. Carotid bruit is not present. No thyromegaly present.   Cardiovascular: Normal rate, S1 normal, S2 normal, normal heart sounds, intact distal pulses and normal pulses. An irregularly irregular rhythm present. PMI is not displaced. Exam reveals no gallop and no friction rub.   No murmur heard.  Pulmonary/Chest: Effort normal. He has rales.   Abdominal: Soft. Bowel sounds are normal.   Musculoskeletal: Normal range of motion.   Neurological: He is alert and oriented to person, place, and time. He has normal strength.   No focal deficits   Skin: Skin is warm. No cyanosis.   Psychiatric: He has a normal mood and affect.                LAB RESULTS (LAST 7 DAYS)    CBC  Results from last 7 days   Lab " Units 07/10/20  0451 07/09/20  0336 07/08/20  1142 07/06/20  1023   WBC 10*3/mm3 8.00 7.00 6.60 7.47   RBC 10*6/mm3 4.25 4.29 4.17 4.23   HEMOGLOBIN g/dL 13.0 13.2 12.9* 13.0   HEMATOCRIT % 40.3 40.2 39.3 39.7   MCV fL 94.8 93.8 94.3 93.9   PLATELETS 10*3/mm3 254 218 207 223       BMP  Results from last 7 days   Lab Units 07/11/20  0334 07/10/20  0451 07/09/20  0336 07/08/20  1142 07/08/20  1018 07/06/20  1023   SODIUM mmol/L  --  138 142  --  139 138   POTASSIUM mmol/L 3.5 3.4* 3.7  --  3.9 4.2   CHLORIDE mmol/L  --  91* 94*  --  95* 94*   CO2 mmol/L  --  35.0* 37.0*  --  33.0* 31.8*   BUN   --  17 17  --  19 18   CREATININE mg/dL  --  1.09 1.05  --  1.01 1.11   GLUCOSE mg/dL  --  119* 110*  --  119* 132*   MAGNESIUM mg/dL 1.8 1.8  --  1.9  --   --        CMP   Results from last 7 days   Lab Units 07/11/20  0334 07/10/20  0842 07/10/20  0451 07/09/20  0336 07/08/20  1018 07/06/20  1023   SODIUM mmol/L  --   --  138 142 139 138   POTASSIUM mmol/L 3.5  --  3.4* 3.7 3.9 4.2   CHLORIDE mmol/L  --   --  91* 94* 95* 94*   CO2 mmol/L  --   --  35.0* 37.0* 33.0* 31.8*   BUN   --   --  17 17 19 18   CREATININE mg/dL  --   --  1.09 1.05 1.01 1.11   GLUCOSE mg/dL  --   --  119* 110* 119* 132*   ALBUMIN g/dL  --  4.30  --  4.30 4.00  --    BILIRUBIN mg/dL  --  1.2  --  0.9 0.9  --    ALK PHOS U/L  --  93  --  92 87  --    AST (SGOT) U/L  --  27 --  20 23  --    ALT (SGPT) U/L  --  12  --  10 11  --          BNP        TROPONIN  Results from last 7 days   Lab Units 07/08/20  1529   TROPONIN T ng/mL 0.032*       CoAg  Results from last 7 days   Lab Units 07/11/20  0334 07/10/20  0451 07/09/20  0825 07/08/20  1142 07/06/20  1023   INR  1.23* 1.25* 1.30* 1.30* 1.69*       Creatinine Clearance  Estimated Creatinine Clearance: 56.9 mL/min (by C-G formula based on SCr of 1.09 mg/dL).    ABG  Results from last 7 days   Lab Units 07/08/20  1117   PH, ARTERIAL pH units 7.361   PCO2, ARTERIAL mm Hg 58.7*   PO2 ART mm Hg 83.8   O2  SATURATION ART % 95.5   BASE EXCESS ART mmol/L 5.9*       Radiology  No radiology results for the last day          EKG        I personally viewed and interpreted the patient's EKG/Telemetry data:    ECHOCARDIOGRAM:    Results for orders placed during the hospital encounter of 07/08/20   Adult Transesophageal Echo (OPAL) W/ Cont if Necessary Per Protocol (Cardiology Department)    Narrative · Estimated EF = 40%.     LV dysfunction EF is around 35 to 40%  RV dilatation noted with decreased function  Left atrium enlarged and right atrium severely enlarged  Interatrial septum is intact bubble study negative  Left atrial appendage no clot  Mitral leaflets thickened and sclerosed there is mild to moderate mitral   insufficiency no vegetations  Tricuspid leaflets structurally appears normal  There is severe tricuspid insufficiency noted  Aortic leaflet sclerosis without any stenosis or insufficiency  Aortic evaluation mild to moderate atheroma noted       STRESS MYOVIEW:    CARDIAC CATHETERIZATION:    OTHER:         Assessment/Plan       Systolic CHF, acute (CMS/HCC)    Biventricular congestive heart failure (CMS/HCC)    Ascending aortic aneurysm (CMS/HCC)    PAF (paroxysmal atrial fibrillation) (CMS/HCC)    COPD mixed type (CMS/HCC)    Alcoholic cirrhosis of liver without ascites (CMS/HCC)    RLS (restless legs syndrome)    DM (diabetes mellitus) (CMS/HCC)    Anxiety disorder    Hypomagnesemia    Abdominal distention      Most probably acute systolic congestive heart failure  Right heart failure  Pulmonary also following the patient  A transesophageal echocardiogram showed severe tricuspid insufficiency RV failure right atrium significantly enlarged , LV systolic dysfunction  Ascending aortic aneurysm followed by CV surgery  Aggressive control of diabetes  Resume anticoagulation with Lovenox  Patient cardiac cath showed multivessel CAD I asked for surgical evaluation  Discussed with CV surgery patient was deemed not a  candidate for surgery very high operative mortality  So I gave him the option of limited PCI which may or may not help with his symptoms  I discussed the patients findings and my recommendations with patient and attending nurse    Qamar Lau MD  07/11/20  13:23

## 2020-07-11 NOTE — PROGRESS NOTES
[facility]/PULM/CC PROGRESS NOTE    Giles Frost is a 79 y.o. male who was referred for consultation for shortness of breath and pulmonary hypertension     Patient has history of congestive heart failure and also has COPD for which he uses Combivent as needed.  He has remote history of tobacco use which he quit about 10 years ago.  He states that he has been using supplemental oxygen 2 L nasal cannula at night.  At one point he was diagnosed with sleep apnea but could not tolerate his CPAP machine and returned it.  He has been using supplemental oxygen since and feels that it is helping.  He has been having some issues with some worsening shortness of breath and lower extremity edema.  He was evaluated by his cardiologist and was planned for an elective heart catheterization.  Patient was noted to have some worsening shortness of breath and he subsequently was admitted to the hospital for further evaluation.  I have been asked to see him for further evaluation of his shortness of breath.  At time of my evaluation patient is awake.  He does complain of shortness of breath with minimal activity.  He has some cough and congestion cough is mostly dry.  He denies any chest pain or palpitations.  He has been having some increased lower extremity edema.  He denies any fever or chills.  No nausea or vomiting.  No history of diet pill use.  No history of blood clots.  No family history of pulmonary hypertension.  His typical bedtime is around 9 PM.  He gets up multiple times at night.  He says that his sleep is very disrupted.  He has issues with restless leg syndrome and because of his worsening symptoms over the last few days he has started taking his Requip 3 times a day and states that it helps.  SUBJECTIVE    7/10: Awake.  Currently on 2 L nasal cannula.  Positive shortness of breath with activity.  Minimal cough.  No complaints of chest pain.  No nausea or vomiting.  Still complains of some restless leg symptoms  7/11:  feeling better, breathing easier. 2L O2. Cardiac cath yesterday.  Denies chest pain    OBJECTIVE    Vitals:    07/11/20 1226 07/11/20 1426 07/11/20 1459 07/11/20 1505   BP: 109/66 115/57     BP Location:  Right arm     Patient Position:  Sitting     Pulse: 86 85 80 80   Resp:  18 20 20   Temp:  98.2 °F (36.8 °C)     TempSrc:  Oral     SpO2:  97% 95% 97%   Weight:       Height:          Intake/Output last 3 shifts:  I/O last 3 completed shifts:  In: -   Out: 1300 [Urine:1300]  Intake/Output this shift:  I/O this shift:  In: 620 [P.O.:620]  Out: 880 [Urine:880]    Constitutional: elderly, no acute distress, non-toxic appearance   Eyes:  PERRL, conjunctiva normal   HENT:  Atraumatic, external ears normal, nose normal  Neck- normal range of motion, no tenderness, supple   Respiratory: decreased air entry bilaterally, faint bibasilar crackles, no wheezes  Cardiovascular:  Normal rate, normal rhythm, no murmurs, no gallops, no rubs   GI:  Soft, nondistended, normal bowel sounds, nontender, no rebound, no guarding   :  No costovertebral angle tenderness   Musculoskeletal:  ++ edema, no tenderness, no deformities. Back- no tenderness  Integument:  Well hydrated, no rash   Neurologic: Alert, no lateralizing deficits  Psychiatric:  Speech and behavior appropriate     Scheduled Meds:    budesonide 0.5 mg Nebulization BID - RT   digoxin 125 mcg Oral QAM   dilTIAZem 15 mg Oral BID   furosemide 40 mg Intravenous BID   insulin lispro 0-9 Units Subcutaneous TID AC   ipratropium-albuterol 3 mL Nebulization 4x Daily - RT   rOPINIRole 0.5 mg Oral 3 times per day   sodium chloride 10 mL Intravenous Q12H   [START ON 7/13/2020] warfarin 1.5 mg Oral Once per day on Mon Wed Fri   warfarin 3 mg Oral Once per day on Sun Tue Thu Sat       Continuous Infusions:    Pharmacy to Dose enoxaparin (LOVENOX)     Pharmacy to dose warfarin     sodium chloride 30 mL/hr Last Rate: 30 mL/hr (07/10/20 1518)       PRN Meds:•  [DISCONTINUED] acetaminophen  **OR** acetaminophen **OR** acetaminophen  •  acetaminophen  •  ALPRAZolam  •  aluminum-magnesium hydroxide-simethicone  •  atropine  •  dextrose  •  dextrose  •  diphenhydrAMINE  •  glucagon (human recombinant)  •  insulin lispro **AND** insulin lispro  •  ipratropium-albuterol  •  magnesium sulfate **OR** magnesium sulfate in D5W 1g/100mL (PREMIX)  •  [DISCONTINUED] ondansetron **OR** ondansetron  •  ondansetron **OR** [DISCONTINUED] ondansetron  •  Pharmacy to Dose enoxaparin (LOVENOX)  •  Pharmacy to dose warfarin  •  potassium chloride  •  sodium chloride  •  sodium chloride     LABS    CBC  Results from last 7 days   Lab Units 07/10/20  0451 07/09/20  0336 07/08/20  1142 07/06/20  1023   WBC 10*3/mm3 8.00 7.00 6.60 7.47   RBC 10*6/mm3 4.25 4.29 4.17 4.23   HEMOGLOBIN g/dL 13.0 13.2 12.9* 13.0   HEMATOCRIT % 40.3 40.2 39.3 39.7   MCV fL 94.8 93.8 94.3 93.9   PLATELETS 10*3/mm3 254 218 207 223       CMP   Results from last 7 days   Lab Units 07/11/20  0334 07/10/20  0842 07/10/20  0451 07/09/20  0336 07/08/20  1018 07/06/20  1023   SODIUM mmol/L  --   --  138 142 139 138   POTASSIUM mmol/L 3.5  --  3.4* 3.7 3.9 4.2   CHLORIDE mmol/L  --   --  91* 94* 95* 94*   CO2 mmol/L  --   --  35.0* 37.0* 33.0* 31.8*   BUN   --   --  17 17 19 18   CREATININE mg/dL  --   --  1.09 1.05 1.01 1.11   GLUCOSE mg/dL  --   --  119* 110* 119* 132*   ALBUMIN g/dL  --  4.30  --  4.30 4.00  --    BILIRUBIN mg/dL  --  1.2  --  0.9 0.9  --    ALK PHOS U/L  --  93  --  92 87  --    AST (SGOT) U/L  --  27 --  20 23  --    ALT (SGPT) U/L  --  12  --  10 11  --        TROPONIN  Results from last 7 days   Lab Units 07/08/20  1529   TROPONIN T ng/mL 0.032*       CoAg  Results from last 7 days   Lab Units 07/11/20  0334 07/10/20  0451 07/09/20  0825 07/08/20  1142 07/06/20  1023   INR  1.23* 1.25* 1.30* 1.30* 1.69*       ABG  Results from last 7 days   Lab Units 07/08/20  1117   PH, ARTERIAL pH units 7.361   PCO2, ARTERIAL mm Hg 58.7*   PO2 ART  mm Hg 83.8   O2 SATURATION ART % 95.5   BASE EXCESS ART mmol/L 5.9*       Microbiology  Microbiology Results (last 10 days)     Procedure Component Value - Date/Time    COVID PRE-OP / PRE-PROCEDURE SCREENING ORDER (NO ISOLATION) - Swab, Nasopharynx [[id number]] Collected:  07/06/20 1023    Lab Status:  Final result Specimen:  Swab from Nasopharynx Updated:  07/07/20 1551    Narrative:       The following orders were created for panel order COVID PRE-OP / PRE-PROCEDURE SCREENING ORDER (NO ISOLATION) - Swab, Nasopharynx.  Procedure                               Abnormality         Status                     ---------                               -----------         ------                     COVID-19,[facility], NP ...[[id number]]                      Final result                 Please view results for these tests on the individual orders.    COVID-19,[facility], NP SWAB IN LEXAR SALINE MEDIA 24-30 HR TAT - Swab, Nasopharynx [[id number]] Collected:  07/06/20 1023    Lab Status:  Final result Specimen:  Swab from Nasopharynx Updated:  07/07/20 1551     Reference Lab Report --     COVID19 Not Detected          IMAGING & OTHER STUDIES    Imaging Results (Last 72 Hours)     ** No results found for the last 72 hours. **        Results for orders placed during the hospital encounter of 07/08/20   Adult Transesophageal Echo (OPAL) W/ Cont if Necessary Per Protocol (Cardiology Department)    Narrative · Estimated EF = 40%.     LV dysfunction EF is around 35 to 40%  RV dilatation noted with decreased function  Left atrium enlarged and right atrium severely enlarged  Interatrial septum is intact bubble study negative  Left atrial appendage no clot  Mitral leaflets thickened and sclerosed there is mild to moderate mitral   insufficiency no vegetations  Tricuspid leaflets structurally appears normal  There is severe tricuspid insufficiency noted  Aortic leaflet sclerosis without any stenosis or insufficiency  Aortic evaluation mild  to moderate atheroma noted            ASSESSMENT/PLAN:     Systolic CHF, acute (CMS/HCC)    Ascending aortic aneurysm (CMS/HCC)    PAF (paroxysmal atrial fibrillation) (CMS/HCC)    COPD mixed type (CMS/HCC)    Alcoholic cirrhosis of liver without ascites (CMS/HCC)    Biventricular congestive heart failure (CMS/HCC)    RLS (restless legs syndrome)    DM (diabetes mellitus) (CMS/HCC)    Anxiety disorder    Hypomagnesemia    Abdominal distention  Pulmonary hypertension.  Recent 2D echo with EF 35%.  RVSP greater than 55 mmHg  COPD  Obstructive sleep apnea with inability to tolerate CPAP.  Chronic hypoxemic respiratory failure.  Patient uses 2 L supplemental oxygen at night at home.  Restless leg syndrome  History of cirrhosis  Diabetes  Acute on chronic systolic congestive heart failure exacerbation    PLAN    I believe his pulmonary hypertension is likely WHO group 2 and 3 related to his left heart dysfunction, untreated sleep apnea and COPD.  We will continue to monitor further recommendations depending on his progress  We will continue with supportive therapy.  Cardiology following for OPAL EF 35-40%   Card cath 7/10 reviewed, severe MV CAD. Pt is not a surgical candidate  We will reevaluate need for supplemental oxygen at discharge.  Patient is currently on 2 L nasal cannula.  He may need supplemental oxygen during the day at home.  He has been using only supplemental oxygen at night at home  For COPD will continue with budesonide and nebs.  He uses Combivent at home.  He remains on Requip which has been adjusted.  Continue to monitor.  Remains on IV diuresis.  Monitor renal function which is stable  We will continue with rest of therapy  He is on warfarin for anticoagulation     THIS DOCUMENT HAS BEEN ELECTRONICALLY SIGNED BY  MELL Marc  11:44 AM

## 2020-07-11 NOTE — PLAN OF CARE
Monitoring pt and assess pain and sleep  Problem: Patient Care Overview  Goal: Plan of Care Review  Outcome: Ongoing (interventions implemented as appropriate)  Flowsheets  Taken 7/11/2020 0602 by Yumi Mcdaniel RN  Progress: no change  Outcome Summary: Patient had cardiac cath yesterday.  Cath site is clean, dry, intact, and soft.  Pulses present. Patient was on bedrest until 2220 yesterday.  Patient  complained of mild anxiety, and verbalized some sadness in regards to the news he was given about his heart yesterday.  All needs met, Vitals stable.  RN will continue to monitor.  Taken 7/11/2020 0849 by Rosie Gooden RN  Plan of Care Reviewed With: patient  Goal: Individualization and Mutuality  Outcome: Ongoing (interventions implemented as appropriate)  Goal: Discharge Needs Assessment  Outcome: Ongoing (interventions implemented as appropriate)  Flowsheets (Taken 7/9/2020 1259 by Kate Warner RN)  Equipment Currently Used at Home: oxygen;cane, straight;rollator;walker, rolling (Uses oxygen at 2 L cont from [facility])  Transportation Anticipated: family or friend will provide  Transportation Concerns: car, none  Concerns to be Addressed: no discharge needs identified  Patient/Family Anticipated Services at Transition: none  Patient/Family Anticipates Transition to: home with family  Goal: Interprofessional Rounds/Family Conf  Outcome: Ongoing (interventions implemented as appropriate)  Flowsheets (Taken 7/11/2020 1014)  Participants: ; nursing; patient; physician     Problem: Fall Risk (Adult)  Goal: Identify Related Risk Factors and Signs and Symptoms  Outcome: Ongoing (interventions implemented as appropriate)  Flowsheets (Taken 7/11/2020 1014)  Related Risk Factors (Fall Risk): history of falls; age-related changes  Signs and Symptoms (Fall Risk): presence of risk factors  Goal: Absence of Fall  Outcome: Ongoing (interventions implemented as appropriate)  Flowsheets (Taken 7/11/2020  1014)  Absence of Fall: making progress toward outcome     Problem: Arrhythmia/Dysrhythmia (Symptomatic) (Adult)  Goal: Signs and Symptoms of Listed Potential Problems Will be Absent, Minimized or Managed (Arrhythmia/Dysrhythmia)  Outcome: Ongoing (interventions implemented as appropriate)  Flowsheets (Taken 7/11/2020 1014)  Problems Assessed (Arrhythmia/Dysrhythmia): other (see comments) (EF 35% hx CHF)  Problems Present (Dysrhythmia): other (see comments)     Problem: Skin Injury Risk (Adult)  Goal: Identify Related Risk Factors and Signs and Symptoms  Outcome: Ongoing (interventions implemented as appropriate)  Flowsheets (Taken 7/11/2020 1014)  Related Risk Factors (Skin Injury Risk): advanced age  Note:   Hx: CHF, A.Fib, COPD, Sleep apnea, DM  Goal: Skin Health and Integrity  Outcome: Ongoing (interventions implemented as appropriate)  Flowsheets (Taken 7/11/2020 1014)  Skin Health and Integrity: making progress toward outcome

## 2020-07-11 NOTE — PROGRESS NOTES
I reviewed his data.  When Dr. Mina saw him in June he was an operable.  He remains that way.  His primary symptoms are related to heart failure and I do not think fixing his coronaries would be of any benefit.  This should be medical treatment all the way.

## 2020-07-11 NOTE — PROGRESS NOTES
[facility] Medicine Services Daily Progress Note      Hospitalist Team  LOS 3 days      Patient Care Team:  Valeriano Lewis MD as PCP - General  Qamar Lau MD (Cardiology)    Patient Location: 262/1      Subjective   Subjective     Chief Complaint / Subjective  No chief complaint on file.      Present on Admission:  • Biventricular congestive heart failure (CMS/HCC)  • COPD mixed type (CMS/HCC)  • RLS (restless legs syndrome)  • DM (diabetes mellitus) (CMS/HCC)  • Anxiety disorder  • Hypomagnesemia  • Abdominal distention  • Systolic CHF, acute (CMS/HCC)      Brief Synopsis of Hospital Course/HPI  Mr. Frost is a 79 y.o. male with past medical history of paroxysmal atrial fibrillation, congestive heart failure, AAA, alcoholic cirrhosis, diabetes mellitus, COPD, restless leg syndrome, and anxiety who presented to [facility] on 7/8/2020 to undergo a heart catheterization.  Upon assessment Dr. Gramajo realized patient had increased shortness of breath and admitted the patient to the hospitalist for further care and management. Heart catherization pushed back until Friday. Patient states he has had increased shortness of breath for the last 3 weeks to the point that he wears oxygen during the day now.  Normally he only wears oxygen at night.  He also noticed his bilateral lower extremities are swelling, and stated this is never happened before.  Humidity makes his shortness of breath worse.  Being in the air conditioning helps her shortness of breath.  He denies any recent nausea, vomiting, diarrhea, fever, chills, cough, or chest pain.  Patient had mention he felt like his shortness of breath started to get worse after his aneurysm surgery.    Date::    7/9/2020  Patient had several questions in regards to his medications, follow-up on his liver function, etc. Addressed concerns. Continuing to have shortness of breath, does not wear CPAP mask at night, states he  "couldn't tolerate about 10 yrs ago. Plan for OPAL and cath tomorrow    7/10/2020  Resting comfortably at bedside, plan for cath today    7/11/2020  Patient and patient's family anxious about results of multi-vessel CAD. Patient remains stable, chest pain free at this time, continues to have SOB     ROS  Constitution: Negative.   HENT: Negative.    Cardiovascular: Positive for leg swelling.   Respiratory: Positive for shortness of breath.    Skin: Negative.    Musculoskeletal: Negative.    Gastrointestinal: Negative.    Genitourinary: Negative.    Neurological: Negative.      Objective   Objective      Vital Signs  Temp:  [97.2 °F (36.2 °C)-98.2 °F (36.8 °C)] 97.7 °F (36.5 °C)  Heart Rate:  [] 82  Resp:  [16-22] 16  BP: (109-128)/(57-66) 125/62  Oxygen Therapy  SpO2: 98 %  Pulse Oximetry Type: Continuous  Device (Oxygen Therapy): nasal cannula  Flow (L/min): 2  Oxygen Concentration (%): 2  Flowsheet Rows      First Filed Value   Admission Height  163.8 cm (64.5\") Documented at 07/08/2020 0825   Admission Weight  76.1 kg (167 lb 12.3 oz) Documented at 07/08/2020 0825        Intake & Output (last 3 days)       07/09 0701 - 07/10 0700 07/10 0701 - 07/11 0700 07/11 0701 - 07/12 0700    P.O. 300  620    I.V. (mL/kg)   0 (0)    Total Intake(mL/kg) 300 (4.1)  620 (8.6)    Urine (mL/kg/hr) 2195 (1.2) 400 (0.2) 880 (1)    Stool  0     Total Output 2195 400 880    Net -1895 -400 -260           Stool Unmeasured Occurrence  1 x         Lines, Drains & Airways    Active LDAs     Name:   Placement date:   Placement time:   Site:   Days:    Peripheral IV 06/09/20 1119 Left Antecubital   06/09/20    1119    Antecubital   30    Peripheral IV 07/08/20 0848 Left Forearm   07/08/20    0848    Forearm   1                  Physical Exam:    Physical Exam  Constitutional: He is oriented to person, place, and time. He appears well-developed and well-nourished.   HENT:   Head: Normocephalic and atraumatic.   Right Ear: External ear " normal.   Left Ear: External ear normal.   Nose: Nose normal.   Mouth/Throat: Oropharynx is clear and moist.   Eyes: Pupils are equal, round, and reactive to light. Conjunctivae and EOM are normal.   Neck: Normal range of motion.   Cardiovascular: Normal rate and normal heart sounds.   S1, S2 audible- irregular heart beat noted    Pulmonary/Chest: Breath sounds normal. On NC   Abdominal: Soft. He exhibits distension.   Musculoskeletal: Normal range of motion. He exhibits edema.   +1 bilateral lower extremity pitting edema   Neurological: He is alert and oriented to person, place, and time.   Skin: Skin is warm and dry. There is erythema.   Slight erythema noted bilateral lower extremities   Psychiatric: He has a normal mood and affect. His behavior is normal. Judgment and thought content normal.   Vitals reviewed.    Procedures:    Procedure(s):  Right and Left Heart Cath          Results Review:     I reviewed the patient's new clinical results.      Lab Results (last 24 hours)     Procedure Component Value Units Date/Time    POC Glucose Once [[id number]]  (Abnormal) Collected:  07/11/20 1545    Specimen:  Blood Updated:  07/11/20 1547     Glucose 157 mg/dL      Comment: Serial Number: [id number]:  [id number]       POC Glucose Once [[id number]]  (Abnormal) Collected:  07/11/20 1117    Specimen:  Blood Updated:  07/11/20 1118     Glucose 141 mg/dL      Comment: Serial Number: [id number]:  [id number]       POC Glucose Once [[id number]]  (Abnormal) Collected:  07/11/20 0729    Specimen:  Blood Updated:  07/11/20 0731     Glucose 122 mg/dL      Comment: Serial Number: [id number]:  [id number]       Potassium [[id number]]  (Normal) Collected:  07/11/20 0334    Specimen:  Blood Updated:  07/11/20 0421     Potassium 3.5 mmol/L      Comment: Specimen hemolyzed.  Results may be affected.       Magnesium [[id number]]  (Normal) Collected:  07/11/20 0334    Specimen:  Blood Updated:  07/11/20 0421     Magnesium 1.8  mg/dL     Protime-INR [[id number]]  (Abnormal) Collected:  07/11/20 0334    Specimen:  Blood Updated:  07/11/20 0407     Protime 12.5 Seconds      INR 1.23        No results found for: HGBA1C  Results from last 7 days   Lab Units 07/11/20  0334 07/10/20  0451 07/09/20  0825   INR  1.23* 1.25* 1.30*       Results from last 7 days   Lab Units 07/08/20  1117   PH, ARTERIAL pH units 7.361   PO2 ART mm Hg 83.8   PCO2, ARTERIAL mm Hg 58.7*   HCO3 ART mmol/L 33.2*         Microbiology Results (last 10 days)     Procedure Component Value - Date/Time    COVID PRE-OP / PRE-PROCEDURE SCREENING ORDER (NO ISOLATION) - Swab, Nasopharynx [[id number]] Collected:  07/06/20 1023    Lab Status:  Final result Specimen:  Swab from Nasopharynx Updated:  07/07/20 1551    Narrative:       The following orders were created for panel order COVID PRE-OP / PRE-PROCEDURE SCREENING ORDER (NO ISOLATION) - Swab, Nasopharynx.  Procedure                               Abnormality         Status                     ---------                               -----------         ------                     COVID-19,[facility], NP ...[[id number]]                      Final result                 Please view results for these tests on the individual orders.    COVID-19,[facility], NP SWAB IN LEXAR SALINE MEDIA 24-30 HR TAT - Swab, Nasopharynx [[id number]] Collected:  07/06/20 1023    Lab Status:  Final result Specimen:  Swab from Nasopharynx Updated:  07/07/20 1551     Reference Lab Report --     COVID19 Not Detected          ECG/EMG Results (most recent)     Procedure Component Value Units Date/Time    ECG 12 Lead [[id number]] Collected:  07/08/20 1026     Updated:  07/10/20 1411    Narrative:       HEART RATE= 59  bpm  RR Interval= 1025  ms  CA Interval=   ms  P Horizontal Axis=   deg  P Front Axis=   deg  QRSD Interval= 106  ms  QT Interval= 424  ms  QRS Axis= 85  deg  T Wave Axis= -2  deg  - ABNORMAL ECG -  Atrial fibrillation  Low voltage, extremity  leads  When compared with ECG of 06-Jul-2020 11:15:20,  Electronically Signed By: Franko Remy ([facility]) 10-Jul-2020 14:06:48  Date and Time of Study: 2020-07-08 10:26:30    Adult Transesophageal Echo (OPAL) W/ Cont if Necessary Per Protocol (Cardiology Department) [[id number]] Collected:  07/10/20 1522     Updated:  07/11/20 0844     BSA 1.9 m^2       CV ECHO PATRICK - BZI_BMI 24.0 kilograms/m^2       CV ECHO PATRICK - BSA(HAYCOCK) 1.9 m^2       CV ECHO PATRICK - BZI_METRIC_WEIGHT 75.7 kg       CV ECHO PATRICK - BZI_METRIC_HEIGHT 177.8 cm      Echo EF Estimated 40 %     Narrative:         · Estimated EF = 40%.     LV dysfunction EF is around 35 to 40%  RV dilatation noted with decreased function  Left atrium enlarged and right atrium severely enlarged  Interatrial septum is intact bubble study negative  Left atrial appendage no clot  Mitral leaflets thickened and sclerosed there is mild to moderate mitral   insufficiency no vegetations  Tricuspid leaflets structurally appears normal  There is severe tricuspid insufficiency noted  Aortic leaflet sclerosis without any stenosis or insufficiency  Aortic evaluation mild to moderate atheroma noted                 Results for orders placed during the hospital encounter of 07/08/20   Adult Transesophageal Echo (OPAL) W/ Cont if Necessary Per Protocol (Cardiology Department)    Narrative · Estimated EF = 40%.     LV dysfunction EF is around 35 to 40%  RV dilatation noted with decreased function  Left atrium enlarged and right atrium severely enlarged  Interatrial septum is intact bubble study negative  Left atrial appendage no clot  Mitral leaflets thickened and sclerosed there is mild to moderate mitral   insufficiency no vegetations  Tricuspid leaflets structurally appears normal  There is severe tricuspid insufficiency noted  Aortic leaflet sclerosis without any stenosis or insufficiency  Aortic evaluation mild to moderate atheroma noted         Xr Abdomen Kub    Result Date:  7/8/2020  Unremarkable bowel gas pattern.  Electronically Signed By-Tim Baltazar On:7/8/2020 1:33 PM This report was finalized on [id number] by  Tim Baltazar, .    Xr Chest Pa & Lateral    Result Date: 7/8/2020  1. Chronic left lower lobe bronchial wall thickening with interstitial opacity which may represent infection and bronchitis/bronchiolitis. 2. Chronic blunting of the right costophrenic angle likely related to a trace effusion or scarring. 3. Upper lobe predominant emphysema with biapical pleural-parenchymal scarring.  Electronically Signed By-Elbert Bruner On:7/8/2020 1:35 PM This report was finalized on [id number] by  Maria D Sharma      Xrays, labs reviewed personally by physician.    Medication Review:   I have reviewed the patient's current medication list      Scheduled Meds    budesonide 0.5 mg Nebulization BID - RT   digoxin 125 mcg Oral QAM   dilTIAZem 15 mg Oral BID   furosemide 40 mg Intravenous BID   insulin lispro 0-9 Units Subcutaneous TID AC   ipratropium-albuterol 3 mL Nebulization 4x Daily - RT   rOPINIRole 0.5 mg Oral 3 times per day   sodium chloride 10 mL Intravenous Q12H   [START ON 7/13/2020] warfarin 1.5 mg Oral Once per day on Mon Wed Fri   warfarin 3 mg Oral Once per day on Sun Tue Thu Sat       Meds Infusions    Pharmacy to Dose enoxaparin (LOVENOX)     Pharmacy to dose warfarin     sodium chloride 30 mL/hr Last Rate: 30 mL/hr (07/10/20 1518)       Meds PRN  •  [DISCONTINUED] acetaminophen **OR** acetaminophen **OR** acetaminophen  •  acetaminophen  •  ALPRAZolam  •  aluminum-magnesium hydroxide-simethicone  •  atropine  •  dextrose  •  dextrose  •  diphenhydrAMINE  •  glucagon (human recombinant)  •  insulin lispro **AND** insulin lispro  •  ipratropium-albuterol  •  magnesium sulfate **OR** magnesium sulfate in D5W 1g/100mL (PREMIX)  •  [DISCONTINUED] ondansetron **OR** ondansetron  •  ondansetron **OR** [DISCONTINUED] ondansetron  •  Pharmacy to Dose enoxaparin  (LOVENOX)  •  Pharmacy to dose warfarin  •  potassium chloride  •  sodium chloride  •  sodium chloride      Assessment/Plan   Assessment/Plan     Active Hospital Problems    Diagnosis  POA   • **Systolic CHF, acute (CMS/HCC) [I50.21]  Yes   • RLS (restless legs syndrome) [G25.81]  Yes   • DM (diabetes mellitus) (CMS/HCC) [E11.9]  Yes   • Anxiety disorder [F41.9]  Yes   • Hypomagnesemia [E83.42]  Yes   • Abdominal distention [R14.0]  Yes   • Biventricular congestive heart failure (CMS/HCC) [I50.82]  Yes   • Alcoholic cirrhosis of liver without ascites (CMS/HCC) [K70.30]  Unknown   • Ascending aortic aneurysm (CMS/HCC) [I71.2]  Unknown   • PAF (paroxysmal atrial fibrillation) (CMS/HCC) [I48.0]  No   • COPD mixed type (CMS/HCC) [J44.9]  Yes      Resolved Hospital Problems   No resolved problems to display.     Shortness of breath secondary to Acute biventricular systolic CHF exacerbation  - Last 2D echo on 6/9/2020 showed Estimated EF = 35%.  - COVID test on 7/6/2020-  - pro BNP 2453- monitor   - Continue IV lasix, monitor BMP   - Cardiology consulted- s/p cath & OPAL   - Cath showing multi-vessel CAD, not candidate for surgery      Abdominal distention  -In setting of heart failure   -Will consider RUQ US     Alcoholic cirrhosis of the liver  - Check CMP  - Follow-up with GI outpatient      AAA S/P endovascular abdominal aortic repair on 9/23/2019   - Follow up with Vascular outpatient      Paroxysmal atrial fibrillation  -Continue diltiazem and digoxin  -Continuous cardiac monitoring  -Check EKG, irregular heartbeat noted on exam   -Coumadin re-started     Diabetes mellitus type 2  -Start sliding scale, Accu-Cheks  -Hemoglobin A1c  -On home metformin     COPD  -Not in exacerbation  -Patient currently on 2 L nasal cannula of oxygen and does not usually wear this during the day at home but has for the last 3 weeks-likely due to CHF  -Continue home breathing treatments     Anxiety  -Stable  -Continue Xanax (INSPECT  verified)      Restless leg syndrome  -Continue Requip     Chronic hypomagnesemia  -Check mag  -On mag ox at home, electrolyte protocol ordered for now     VTE Prophylaxis - Coumadin    Code Status -   Code Status and Medical Interventions:   Ordered at: 07/08/20 1046     Level Of Support Discussed With:    Patient     Code Status:    CPR     Medical Interventions (Level of Support Prior to Arrest):    Full       Discharge Planning  Patient stable from cardiac standpoint, will switch to PO diuresis and observe, likely medically stable for discharge 7/12/2020    Destination      Coordination has not been started for this encounter.      Durable Medical Equipment      Coordination has not been started for this encounter.      Dialysis/Infusion      Coordination has not been started for this encounter.      Home Medical Care      Coordination has not been started for this encounter.      Therapy      Coordination has not been started for this encounter.      Community Resources      Coordination has not been started for this encounter.            Electronically signed by Gwen Hager MD, 07/11/20, 19:43.  [facility] Hospitalist Team

## 2020-07-11 NOTE — NURSING NOTE
Spoke with pt's daughter about pt's diagnoses and gave her a better background of what is going on with her father and chance of him going home tomorrow

## 2020-07-11 NOTE — PLAN OF CARE
Problem: Patient Care Overview  Goal: Plan of Care Review  Outcome: Ongoing (interventions implemented as appropriate)  Flowsheets (Taken 7/11/2020 0602)  Progress: no change  Plan of Care Reviewed With: patient  Outcome Summary: Patient had cardiac cath yesterday.  Cath site is clean, dry, intact, and soft.  Pulses present. Patient was on bedrest until 2220 yesterday.  Patient  complained of mild anxiety, and verbalized some sadness in regards to the news he was given about his heart yesterday.  All needs met, Vitals stable.  RN will continue to monitor.

## 2020-07-11 NOTE — NURSING NOTE
Pt did note he had some small needle jabbing pain on his upper lower left chest that lasted only about a minute.  Pt said he felt better and did not have the pain when I entered the room.

## 2020-07-12 VITALS
HEIGHT: 65 IN | OXYGEN SATURATION: 99 % | HEART RATE: 81 BPM | BODY MASS INDEX: 26.45 KG/M2 | SYSTOLIC BLOOD PRESSURE: 116 MMHG | WEIGHT: 158.73 LBS | DIASTOLIC BLOOD PRESSURE: 63 MMHG | TEMPERATURE: 97.4 F | RESPIRATION RATE: 20 BRPM

## 2020-07-12 LAB
GLUCOSE BLDC GLUCOMTR-MCNC: 128 MG/DL (ref 70–105)
GLUCOSE BLDC GLUCOMTR-MCNC: 219 MG/DL (ref 70–105)
INR PPP: 1.18 (ref 2–3)
MAGNESIUM SERPL-MCNC: 1.9 MG/DL (ref 1.6–2.4)
POTASSIUM SERPL-SCNC: 3.2 MMOL/L (ref 3.5–5.2)
PROTHROMBIN TIME: 12 SECONDS (ref 19.4–28.5)

## 2020-07-12 PROCEDURE — 83735 ASSAY OF MAGNESIUM: CPT | Performed by: INTERNAL MEDICINE

## 2020-07-12 PROCEDURE — 94799 UNLISTED PULMONARY SVC/PX: CPT

## 2020-07-12 PROCEDURE — 82962 GLUCOSE BLOOD TEST: CPT

## 2020-07-12 PROCEDURE — 93010 ELECTROCARDIOGRAM REPORT: CPT | Performed by: INTERNAL MEDICINE

## 2020-07-12 PROCEDURE — 84132 ASSAY OF SERUM POTASSIUM: CPT | Performed by: INTERNAL MEDICINE

## 2020-07-12 PROCEDURE — 99239 HOSP IP/OBS DSCHRG MGMT >30: CPT | Performed by: HOSPITALIST

## 2020-07-12 PROCEDURE — 97110 THERAPEUTIC EXERCISES: CPT

## 2020-07-12 PROCEDURE — 63710000001 INSULIN LISPRO (HUMAN) PER 5 UNITS: Performed by: INTERNAL MEDICINE

## 2020-07-12 PROCEDURE — 85610 PROTHROMBIN TIME: CPT | Performed by: INTERNAL MEDICINE

## 2020-07-12 PROCEDURE — 99232 SBSQ HOSP IP/OBS MODERATE 35: CPT | Performed by: INTERNAL MEDICINE

## 2020-07-12 PROCEDURE — 97116 GAIT TRAINING THERAPY: CPT

## 2020-07-12 RX ORDER — MIRTAZAPINE 15 MG/1
15 TABLET, FILM COATED ORAL NIGHTLY
Qty: 30 TABLET | Refills: 2 | Status: SHIPPED | OUTPATIENT
Start: 2020-07-12

## 2020-07-12 RX ADMIN — ROPINIROLE 0.5 MG: 0.25 TABLET, FILM COATED ORAL at 06:20

## 2020-07-12 RX ADMIN — POTASSIUM CHLORIDE 40 MEQ: 1500 TABLET, EXTENDED RELEASE ORAL at 09:51

## 2020-07-12 RX ADMIN — IPRATROPIUM BROMIDE AND ALBUTEROL SULFATE 3 ML: 2.5; .5 SOLUTION RESPIRATORY (INHALATION) at 10:31

## 2020-07-12 RX ADMIN — ALPRAZOLAM 0.5 MG: 0.5 TABLET ORAL at 02:32

## 2020-07-12 RX ADMIN — DIGOXIN 125 MCG: 0.12 TABLET ORAL at 12:08

## 2020-07-12 RX ADMIN — IPRATROPIUM BROMIDE AND ALBUTEROL SULFATE 3 ML: 2.5; .5 SOLUTION RESPIRATORY (INHALATION) at 06:33

## 2020-07-12 RX ADMIN — DILTIAZEM HYDROCHLORIDE 15 MG: 30 TABLET ORAL at 09:51

## 2020-07-12 RX ADMIN — Medication 10 ML: at 09:51

## 2020-07-12 RX ADMIN — FUROSEMIDE 80 MG: 40 TABLET ORAL at 09:51

## 2020-07-12 RX ADMIN — ROPINIROLE 0.5 MG: 0.25 TABLET, FILM COATED ORAL at 12:08

## 2020-07-12 RX ADMIN — INSULIN LISPRO 4 UNITS: 100 INJECTION, SOLUTION INTRAVENOUS; SUBCUTANEOUS at 12:08

## 2020-07-12 RX ADMIN — BUDESONIDE 0.5 MG: 0.5 INHALANT RESPIRATORY (INHALATION) at 06:33

## 2020-07-12 NOTE — PLAN OF CARE
Problem: Patient Care Overview  Goal: Plan of Care Review  Outcome: Ongoing (interventions implemented as appropriate)  Flowsheets (Taken 7/12/2020 1240)  Outcome Summary: Pt was alert and oriented x 4. Pt had no c/o pain. Pt stood with rollator with conditional independence, ambulated 20 feet x 2. Pt followed VC and visual cues for standing exercise toe touch front x 10 alternating Le's. Recommendation is home with assist. PPE worn: mask, eyeshield, gloves.

## 2020-07-12 NOTE — PROGRESS NOTES
Reason for follow-up: Severe cardiomyopathy  Acute congestive heart failure   Atrial fibrillation  Ascending aortic aneurysm  Multivessel CAD  Patient Care Team:  Valeriano Lewis MD as PCP - General  Qamar Lau MD (Cardiology)    Subjective .   Feeling better, no acute events overniight, no CP, furhter recs per Dr. Gramajo tomorrow related to revascularization attempts.      Review of Systems   Constitution: Positive for malaise/fatigue. Negative for fever.   HENT: Negative for congestion and hearing loss.    Eyes: Negative for double vision and visual disturbance.   Cardiovascular: Positive for dyspnea on exertion, orthopnea and paroxysmal nocturnal dyspnea. Negative for chest pain, claudication, leg swelling and syncope.   Respiratory: Positive for shortness of breath. Negative for cough.    Endocrine: Negative for cold intolerance.   Skin: Negative for color change and rash.   Musculoskeletal: Negative for arthritis and joint pain.   Gastrointestinal: Negative for abdominal pain and heartburn.   Genitourinary: Negative for hematuria.   Neurological: Negative for excessive daytime sleepiness and dizziness.   Psychiatric/Behavioral: Negative for depression. The patient is not nervous/anxious.    All other systems reviewed and are negative.      Gabapentin    Scheduled Meds:    budesonide 0.5 mg Nebulization BID - RT   digoxin 125 mcg Oral QAM   dilTIAZem 15 mg Oral BID   furosemide 80 mg Oral Daily With Breakfast   insulin lispro 0-9 Units Subcutaneous TID AC   ipratropium-albuterol 3 mL Nebulization 4x Daily - RT   rOPINIRole 0.5 mg Oral 3 times per day   sodium chloride 10 mL Intravenous Q12H   [START ON 7/13/2020] warfarin 1.5 mg Oral Once per day on Mon Wed Fri   warfarin 3 mg Oral Once per day on Sun Tue Thu Sat     Continuous Infusions:    Pharmacy to Dose enoxaparin (LOVENOX)     Pharmacy to dose warfarin     sodium chloride 30 mL/hr Last Rate: 30 mL/hr (07/10/20 1518)     PRN Meds:.•   "[DISCONTINUED] acetaminophen **OR** acetaminophen **OR** acetaminophen  •  acetaminophen  •  ALPRAZolam  •  aluminum-magnesium hydroxide-simethicone  •  atropine  •  dextrose  •  dextrose  •  diphenhydrAMINE  •  glucagon (human recombinant)  •  insulin lispro **AND** insulin lispro  •  ipratropium-albuterol  •  magnesium sulfate **OR** magnesium sulfate in D5W 1g/100mL (PREMIX)  •  [DISCONTINUED] ondansetron **OR** ondansetron  •  ondansetron **OR** [DISCONTINUED] ondansetron  •  Pharmacy to Dose enoxaparin (LOVENOX)  •  Pharmacy to dose warfarin  •  potassium chloride  •  sodium chloride  •  sodium chloride    Objective   Looks in mild distress    VITAL SIGNS  Vitals:    07/12/20 1009 07/12/20 1031 07/12/20 1034 07/12/20 1416   BP: 114/65   116/63   BP Location: Right arm   Right arm   Patient Position: Sitting   Sitting   Pulse: 86 80  81   Resp: 18 18 18 20   Temp: 97.4 °F (36.3 °C)   97.4 °F (36.3 °C)   TempSrc: Oral   Oral   SpO2: 99% 100%  99%   Weight:       Height:           Flowsheet Rows      First Filed Value   Admission Height  163.8 cm (64.5\") Documented at 07/08/2020 0825   Admission Weight  76.1 kg (167 lb 12.3 oz) Documented at 07/08/2020 0825           TELEMETRY: Atrial fibrillation    Physical Exam:  Physical Exam   Constitutional: He is oriented to person, place, and time. He appears well-developed and well-nourished. He is cooperative.   HENT:   Head: Normocephalic and atraumatic.   Mouth/Throat: Uvula is midline and oropharynx is clear and moist. No oral lesions.   Eyes: Conjunctivae are normal. No scleral icterus.   Neck: Trachea normal. Neck supple. No JVD present. Carotid bruit is not present. No thyromegaly present.   Cardiovascular: Normal rate, S1 normal, S2 normal, normal heart sounds, intact distal pulses and normal pulses. An irregularly irregular rhythm present. PMI is not displaced. Exam reveals no gallop and no friction rub.   No murmur heard.  Pulmonary/Chest: Effort normal. He has " rales.   Abdominal: Soft. Bowel sounds are normal.   Musculoskeletal: Normal range of motion.   Neurological: He is alert and oriented to person, place, and time. He has normal strength.   No focal deficits   Skin: Skin is warm. No cyanosis.   Psychiatric: He has a normal mood and affect.      Unchanged from prior encounter. Copied forward and reviewed.           LAB RESULTS (LAST 7 DAYS)    CBC  Results from last 7 days   Lab Units 07/10/20  0451 07/09/20  0336 07/08/20  1142 07/06/20  1023   WBC 10*3/mm3 8.00 7.00 6.60 7.47   RBC 10*6/mm3 4.25 4.29 4.17 4.23   HEMOGLOBIN g/dL 13.0 13.2 12.9* 13.0   HEMATOCRIT % 40.3 40.2 39.3 39.7   MCV fL 94.8 93.8 94.3 93.9   PLATELETS 10*3/mm3 254 218 207 223       BMP  Results from last 7 days   Lab Units 07/12/20  0300 07/11/20  0334 07/10/20  0451 07/09/20  0336 07/08/20  1142 07/08/20  1018 07/06/20  1023   SODIUM mmol/L  --   --  138 142  --  139 138   POTASSIUM mmol/L 3.2* 3.5 3.4* 3.7  --  3.9 4.2   CHLORIDE mmol/L  --   --  91* 94*  --  95* 94*   CO2 mmol/L  --   --  35.0* 37.0*  --  33.0* 31.8*   BUN   --   --  17 17  --  19 18   CREATININE mg/dL  --   --  1.09 1.05  --  1.01 1.11   GLUCOSE mg/dL  --   --  119* 110*  --  119* 132*   MAGNESIUM mg/dL 1.9 1.8 1.8  --  1.9  --   --        CMP   Results from last 7 days   Lab Units 07/12/20  0300 07/11/20  0334 07/10/20  0842 07/10/20  0451 07/09/20  0336 07/08/20  1018 07/06/20  1023   SODIUM mmol/L  --   --   --  138 142 139 138   POTASSIUM mmol/L 3.2* 3.5  --  3.4* 3.7 3.9 4.2   CHLORIDE mmol/L  --   --   --  91* 94* 95* 94*   CO2 mmol/L  --   --   --  35.0* 37.0* 33.0* 31.8*   BUN   --   --   --  17 17 19 18   CREATININE mg/dL  --   --   --  1.09 1.05 1.01 1.11   GLUCOSE mg/dL  --   --   --  119* 110* 119* 132*   ALBUMIN g/dL  --   --  4.30  --  4.30 4.00  --    BILIRUBIN mg/dL  --   --  1.2  --  0.9 0.9  --    ALK PHOS U/L  --   --  93  --  92 87  --    AST (SGOT) U/L  --   --  27  --  20 23  --    ALT (SGPT) U/L  --    --  12  --  10 11  --          BNP        TROPONIN  Results from last 7 days   Lab Units 07/08/20  1529   TROPONIN T ng/mL 0.032*       CoAg  Results from last 7 days   Lab Units 07/12/20  0300 07/11/20  0334 07/10/20  0451 07/09/20  0825 07/08/20  1142 07/06/20  1023   INR  1.18* 1.23* 1.25* 1.30* 1.30* 1.69*       Creatinine Clearance  Estimated Creatinine Clearance: 56.9 mL/min (by C-G formula based on SCr of 1.09 mg/dL).    ABG  Results from last 7 days   Lab Units 07/08/20  1117   PH, ARTERIAL pH units 7.361   PCO2, ARTERIAL mm Hg 58.7*   PO2 ART mm Hg 83.8   O2 SATURATION ART % 95.5   BASE EXCESS ART mmol/L 5.9*       Radiology  No radiology results for the last day          EKG        I personally viewed and interpreted the patient's EKG/Telemetry data:    ECHOCARDIOGRAM:    Results for orders placed during the hospital encounter of 07/08/20   Adult Transesophageal Echo (OPAL) W/ Cont if Necessary Per Protocol (Cardiology Department)    Narrative · Estimated EF = 40%.     LV dysfunction EF is around 35 to 40%  RV dilatation noted with decreased function  Left atrium enlarged and right atrium severely enlarged  Interatrial septum is intact bubble study negative  Left atrial appendage no clot  Mitral leaflets thickened and sclerosed there is mild to moderate mitral   insufficiency no vegetations  Tricuspid leaflets structurally appears normal  There is severe tricuspid insufficiency noted  Aortic leaflet sclerosis without any stenosis or insufficiency  Aortic evaluation mild to moderate atheroma noted       STRESS MYOVIEW:    CARDIAC CATHETERIZATION:    OTHER:         Assessment/Plan       Systolic CHF, acute (CMS/HCC)    Ascending aortic aneurysm (CMS/HCC)    PAF (paroxysmal atrial fibrillation) (CMS/HCC)    COPD mixed type (CMS/HCC)    Alcoholic cirrhosis of liver without ascites (CMS/HCC)    Biventricular congestive heart failure (CMS/HCC)    RLS (restless legs syndrome)    DM (diabetes mellitus) (CMS/HCC)     Anxiety disorder    Hypomagnesemia    Abdominal distention      Most probably acute systolic congestive heart failure  Right heart failure  Pulmonary also following the patient  A transesophageal echocardiogram showed severe tricuspid insufficiency RV failure right atrium significantly enlarged , LV systolic dysfunction  Ascending aortic aneurysm followed by CV surgery  Aggressive control of diabetes  Resume anticoagulation with Lovenox  Patient cardiac cath showed multivessel CAD I asked for surgical evaluation  Discussed with CV surgery patient was deemed not a candidate for surgery very high operative mortality  Dr. Gramajo  To review option of limited PCI   I discussed the patients findings and my recommendations with patient and attending nurse      Dr. Gramajo to give further rec tomorrow for CAD and revascularization.     Franko Remy MD  07/12/20  14:18

## 2020-07-12 NOTE — PROGRESS NOTES
[facility]/PULM/CC PROGRESS NOTE    Giles Frost is a 79 y.o. male who was referred for consultation for shortness of breath and pulmonary hypertension     Patient has history of congestive heart failure and also has COPD for which he uses Combivent as needed.  He has remote history of tobacco use which he quit about 10 years ago.  He states that he has been using supplemental oxygen 2 L nasal cannula at night.  At one point he was diagnosed with sleep apnea but could not tolerate his CPAP machine and returned it.  He has been using supplemental oxygen since and feels that it is helping.  He has been having some issues with some worsening shortness of breath and lower extremity edema.  He was evaluated by his cardiologist and was planned for an elective heart catheterization.  Patient was noted to have some worsening shortness of breath and he subsequently was admitted to the hospital for further evaluation.  I have been asked to see him for further evaluation of his shortness of breath.  At time of my evaluation patient is awake.  He does complain of shortness of breath with minimal activity.  He has some cough and congestion cough is mostly dry.  He denies any chest pain or palpitations.  He has been having some increased lower extremity edema.  He denies any fever or chills.  No nausea or vomiting.  No history of diet pill use.  No history of blood clots.  No family history of pulmonary hypertension.  His typical bedtime is around 9 PM.  He gets up multiple times at night.  He says that his sleep is very disrupted.  He has issues with restless leg syndrome and because of his worsening symptoms over the last few days he has started taking his Requip 3 times a day and states that it helps.  SUBJECTIVE    7/10: Awake.  Currently on 2 L nasal cannula.  Positive shortness of breath with activity.  Minimal cough.  No complaints of chest pain.  No nausea or vomiting.  Still complains of some restless leg symptoms  7/11:  feeling better, breathing easier. 2L O2. Cardiac cath yesterday.  Denies chest pain  7/12: pt reports feeling well and ready to go home. O2 2L, uses home O2.  No c/o chest pain    OBJECTIVE    Vitals:    07/12/20 0636 07/12/20 1009 07/12/20 1031 07/12/20 1034   BP:  114/65     BP Location:  Right arm     Patient Position:  Sitting     Pulse:  86 80    Resp: 20 18 18 18   Temp:  97.4 °F (36.3 °C)     TempSrc:  Oral     SpO2:  99% 100%    Weight:       Height:          Intake/Output last 3 shifts:  I/O last 3 completed shifts:  In: 620 [P.O.:620]  Out: 1805 [Urine:1805]  Intake/Output this shift:  I/O this shift:  In: 240 [P.O.:240]  Out: -     Constitutional: elderly, no acute distress, non-toxic appearance   Eyes:  PERRL, conjunctiva normal   HENT:  Atraumatic, external ears normal, nose normal  Neck- normal range of motion, no tenderness, supple   Respiratory: decreased air entry bilaterally, faint bibasilar crackles, no wheezes  Cardiovascular:  Normal rate, normal rhythm, no murmurs, no gallops, no rubs   GI:  Soft, nondistended, normal bowel sounds, nontender, no rebound, no guarding   :  No costovertebral angle tenderness   Musculoskeletal:  ++ edema, no tenderness, no deformities. Back- no tenderness  Integument:  Well hydrated, no rash   Neurologic: Alert, no lateralizing deficits  Psychiatric:  Speech and behavior appropriate     Scheduled Meds:    budesonide 0.5 mg Nebulization BID - RT   digoxin 125 mcg Oral QAM   dilTIAZem 15 mg Oral BID   furosemide 80 mg Oral Daily With Breakfast   insulin lispro 0-9 Units Subcutaneous TID AC   ipratropium-albuterol 3 mL Nebulization 4x Daily - RT   rOPINIRole 0.5 mg Oral 3 times per day   sodium chloride 10 mL Intravenous Q12H   [START ON 7/13/2020] warfarin 1.5 mg Oral Once per day on Mon Wed Fri   warfarin 3 mg Oral Once per day on Sun Tue Thu Sat       Continuous Infusions:    Pharmacy to Dose enoxaparin (LOVENOX)     Pharmacy to dose warfarin     sodium chloride  30 mL/hr Last Rate: 30 mL/hr (07/10/20 1518)       PRN Meds:•  [DISCONTINUED] acetaminophen **OR** acetaminophen **OR** acetaminophen  •  acetaminophen  •  ALPRAZolam  •  aluminum-magnesium hydroxide-simethicone  •  atropine  •  dextrose  •  dextrose  •  diphenhydrAMINE  •  glucagon (human recombinant)  •  insulin lispro **AND** insulin lispro  •  ipratropium-albuterol  •  magnesium sulfate **OR** magnesium sulfate in D5W 1g/100mL (PREMIX)  •  [DISCONTINUED] ondansetron **OR** ondansetron  •  ondansetron **OR** [DISCONTINUED] ondansetron  •  Pharmacy to Dose enoxaparin (LOVENOX)  •  Pharmacy to dose warfarin  •  potassium chloride  •  sodium chloride  •  sodium chloride     LABS    CBC  Results from last 7 days   Lab Units 07/10/20  0451 07/09/20  0336 07/08/20  1142 07/06/20  1023   WBC 10*3/mm3 8.00 7.00 6.60 7.47   RBC 10*6/mm3 4.25 4.29 4.17 4.23   HEMOGLOBIN g/dL 13.0 13.2 12.9* 13.0   HEMATOCRIT % 40.3 40.2 39.3 39.7   MCV fL 94.8 93.8 94.3 93.9   PLATELETS 10*3/mm3 254 218 207 223       CMP   Results from last 7 days   Lab Units 07/12/20  0300 07/11/20  0334 07/10/20  0842 07/10/20  0451 07/09/20  0336 07/08/20  1018 07/06/20  1023   SODIUM mmol/L  --   --   --  138 142 139 138   POTASSIUM mmol/L 3.2* 3.5  --  3.4* 3.7 3.9 4.2   CHLORIDE mmol/L  --   --   --  91* 94* 95* 94*   CO2 mmol/L  --   --   --  35.0* 37.0* 33.0* 31.8*   BUN   --   --   --  17 17 19 18   CREATININE mg/dL  --   --   --  1.09 1.05 1.01 1.11   GLUCOSE mg/dL  --   --   --  119* 110* 119* 132*   ALBUMIN g/dL  --   --  4.30  --  4.30 4.00  --    BILIRUBIN mg/dL  --   --  1.2  --  0.9 0.9  --    ALK PHOS U/L  --   --  93  --  92 87  --    AST (SGOT) U/L  --   --  27 --  20 23  --    ALT (SGPT) U/L  --   --  12  --  10 11  --        TROPONIN  Results from last 7 days   Lab Units 07/08/20  1529   TROPONIN T ng/mL 0.032*       CoAg  Results from last 7 days   Lab Units 07/12/20  0300 07/11/20  0334 07/10/20  0451 07/09/20  0825 07/08/20  1142  07/06/20  1023   INR  1.18* 1.23* 1.25* 1.30* 1.30* 1.69*       ABG  Results from last 7 days   Lab Units 07/08/20  1117   PH, ARTERIAL pH units 7.361   PCO2, ARTERIAL mm Hg 58.7*   PO2 ART mm Hg 83.8   O2 SATURATION ART % 95.5   BASE EXCESS ART mmol/L 5.9*       Microbiology  Microbiology Results (last 10 days)     Procedure Component Value - Date/Time    COVID PRE-OP / PRE-PROCEDURE SCREENING ORDER (NO ISOLATION) - Swab, Nasopharynx [[id number]] Collected:  07/06/20 1023    Lab Status:  Final result Specimen:  Swab from Nasopharynx Updated:  07/07/20 1551    Narrative:       The following orders were created for panel order COVID PRE-OP / PRE-PROCEDURE SCREENING ORDER (NO ISOLATION) - Swab, Nasopharynx.  Procedure                               Abnormality         Status                     ---------                               -----------         ------                     COVID-19,[facility], NP ...[[id number]]                      Final result                 Please view results for these tests on the individual orders.    COVID-19,[facility], NP SWAB IN LEXAR SALINE MEDIA 24-30 HR TAT - Swab, Nasopharynx [[id number]] Collected:  07/06/20 1023    Lab Status:  Final result Specimen:  Swab from Nasopharynx Updated:  07/07/20 1551     Reference Lab Report --     COVID19 Not Detected          IMAGING & OTHER STUDIES    Imaging Results (Last 72 Hours)     ** No results found for the last 72 hours. **        Results for orders placed during the hospital encounter of 07/08/20   Adult Transesophageal Echo (OPAL) W/ Cont if Necessary Per Protocol (Cardiology Department)    Narrative · Estimated EF = 40%.     LV dysfunction EF is around 35 to 40%  RV dilatation noted with decreased function  Left atrium enlarged and right atrium severely enlarged  Interatrial septum is intact bubble study negative  Left atrial appendage no clot  Mitral leaflets thickened and sclerosed there is mild to moderate mitral   insufficiency no  vegetations  Tricuspid leaflets structurally appears normal  There is severe tricuspid insufficiency noted  Aortic leaflet sclerosis without any stenosis or insufficiency  Aortic evaluation mild to moderate atheroma noted            ASSESSMENT/PLAN:     Systolic CHF, acute (CMS/HCC)    Ascending aortic aneurysm (CMS/HCC)    PAF (paroxysmal atrial fibrillation) (CMS/HCC)    COPD mixed type (CMS/HCC)    Alcoholic cirrhosis of liver without ascites (CMS/HCC)    Biventricular congestive heart failure (CMS/HCC)    RLS (restless legs syndrome)    DM (diabetes mellitus) (CMS/HCC)    Anxiety disorder    Hypomagnesemia    Abdominal distention  Pulmonary hypertension.  Recent 2D echo with EF 35%.  RVSP greater than 55 mmHg  COPD  Obstructive sleep apnea with inability to tolerate CPAP.  Chronic hypoxemic respiratory failure.  Patient uses 2 L supplemental oxygen at night at home.  Restless leg syndrome  History of cirrhosis  Diabetes  Acute on chronic systolic congestive heart failure exacerbation    PLAN    I believe his pulmonary hypertension is likely WHO group 2 and 3 related to his left heart dysfunction, untreated sleep apnea and COPD.  We will continue to monitor further recommendations depending on his progress  We will continue with supportive therapy.  Cardiology following for OPAL EF 35-40%   Card cath 7/10 reviewed, severe MV CAD. Pt is not a surgical candidate  We will reevaluate need for supplemental oxygen at discharge.  Patient is currently on 2 L nasal cannula.  He may need supplemental oxygen during the day at home.  He has been using only supplemental oxygen at night at home  For COPD will continue with budesonide and nebs.  He uses Combivent at home.  He remains on Requip which has been adjusted.  Continue to monitor.  Remains on IV diuresis.  Monitor renal function which is stable  We will continue with rest of therapy  He is on warfarin for anticoagulation    Likely discharge today. He is stable from a  pulmonary standpoint.  Currently on 2L O2 which is his home dose. He states he has O2 equipment at home.  Follow up in the pulm clinic in 4 weeks     THIS DOCUMENT HAS BEEN ELECTRONICALLY SIGNED BY  MELL Marc  11:44 AM

## 2020-07-12 NOTE — PROGRESS NOTES
"Pharmacy dosing service  Anticoagulant  Warfarin     Subjective:    Giles Frost is a 79 y.o.male being continued on warfarin for atrial fibrillation.    INR Goal: 2 - 3  Home medication?: Yes, warfarin 1.5 mg PO on MWF and warfarin 3 mg PO on TuThFrSaSu (last night's dose, 1.5 mg)  Bridge Therapy Present?:  No  Interacting Medications Evaluation (New/Present/Discontinued): none  Additional Contributing Factors: none      Assessment/Plan:    Patient resumed warfarin last night with home dose of 3 mg. Will continue home regimen, patient to receive another 3 mg dose tonight. INR remains subtherapeutic, d/t previous hold.     Continue to monitor and adjust based on INR.         Date 7/11 7/12          INR 1.23 1.18          Dose 3mg 3 mg              Objective:  [Ht: 163.8 cm (64.5\"); Wt: 72 kg (158 lb 11.7 oz); BMI: Body mass index is 26.83 kg/m².]    Lab Results   Component Value Date    LABPROT 29.2 (H) 07/03/2019    ALBUMIN 4.30 07/10/2020     Lab Results   Component Value Date    INR 1.18 (L) 07/12/2020    INR 1.23 (L) 07/11/2020    INR 1.25 (L) 07/10/2020    PROTIME 12.0 (L) 07/12/2020    PROTIME 12.5 (L) 07/11/2020    PROTIME 12.6 (L) 07/10/2020     Lab Results   Component Value Date    HGB 13.0 07/10/2020    HGB 13.2 07/09/2020    HGB 12.9 (L) 07/08/2020     Lab Results   Component Value Date    HCT 40.3 07/10/2020    HCT 40.2 07/09/2020    HCT 39.3 07/08/2020       Uriel Cerna, [facility]  07/12/20 09:08       "

## 2020-07-12 NOTE — PLAN OF CARE
Problem: Patient Care Overview  Goal: Plan of Care Review  Outcome: Ongoing (interventions implemented as appropriate)  Flowsheets (Taken 7/12/2020 4894)  Progress: no change  Plan of Care Reviewed With: patient  Outcome Summary: Patient expecting to d/c today.  Patient c/o restless legs and lack of sleep throughout the night.  Will continue to monitor.

## 2020-07-12 NOTE — THERAPY TREATMENT NOTE
Patient Name: Giles Frost  : 1940    MRN: [id number]                              Today's Date: 2020       Admit Date: 2020    Visit Dx:     ICD-10-CM ICD-9-CM   1. Systolic CHF, acute (CMS/HCC) I50.21 428.21     428.0   2. Ascending aortic aneurysm (CMS/HCC) I71.2 441.2   3. PAF (paroxysmal atrial fibrillation) (CMS/HCC) I48.0 427.31   4. Alcoholic cirrhosis of liver without ascites (CMS/HCC) K70.30 571.2   5. Biventricular congestive heart failure (CMS/HCC) I50.82 428.0     Patient Active Problem List   Diagnosis   • Ascending aortic aneurysm (CMS/HCC)   • Incidental lung nodule, > 3mm and < 8mm   • PAF (paroxysmal atrial fibrillation) (CMS/HCC)   • COPD mixed type (CMS/HCC)   • Abdominal aortic aneurysm (AAA) without rupture (CMS/HCC)   • Non-rheumatic tricuspid valve insufficiency   • Acute abdominal pain   • Alcoholic cirrhosis of liver without ascites (CMS/HCC)   • AAA (abdominal aortic aneurysm) without rupture (CMS/HCC)   • Atrial fibrillation (CMS/HCC)   • Biventricular congestive heart failure (CMS/HCC)   • RLS (restless legs syndrome)   • DM (diabetes mellitus) (CMS/HCC)   • Anxiety disorder   • Hypomagnesemia   • Abdominal distention   • Systolic CHF, acute (CMS/HCC)     Past Medical History:   Diagnosis Date   • AAA (abdominal aortic aneurysm) (CMS/HCC)    • Alcohol abuse     Hx   • Alcoholic cirrhosis (CMS/HCC)    • Ankle edema, bilateral     at times   • Anxiety     Welbutrin   • Anxiety    • Aortic aneurysm (CMS/HCC) 2018    Ascending Measuring 4.7CM Noted on CT Chest   • Atrial fibrillation (CMS/HCC)    • Cardiomegaly 2018    CT Chest   • Centrilobular emphysema (CMS/HCC) 2018    On CT Chest   • CHF (congestive heart failure) (CMS/HCC)    • Cholecystolithiasis 2018    On CT Chest   • Chronic liver disease    • COPD (chronic obstructive pulmonary disease) (CMS/HCC)    • Depression    • Descending thoracic aortic aneurysm (CMS/HCC) 2018    On Chest  CT-4.2CM Near Diaphragmatic Hiatus and 4.8CM at Main Pulmonary Artery   • Diabetes mellitus (CMS/HCC)    • Diarrhea    • History of echocardiogram 12/20/16 & 8/2/17-[facility]   • History of EKG 12/2016    Atrial Fib   • Hx of chest x-ray 12/18/2016    1 V   • Hx of chest x-ray 08/2011   • Hx of CT scan of chest 09/19/18-[facility]    Enlarged Heart Size; Coronary Artery and Aortic Atherosclerotic Calcifications; Ascending Aorta Measuring 4.7CM;    • Long term current use of anticoagulant    • On home oxygen therapy     2 LPM QHS   • Paraseptal emphysema (CMS/HCC) 09/19/2018    On CT Chest   • Pleural nodule 09/19/2018    On CT Chest-6MM   • Pneumonia 06/2019    Hx   • Pulmonary hypertension (CMS/HCC)    • Pulmonary nodules 09/19/2018    R on CT Chest   • Respiratory failure (CMS/HCC) Hypoxic   • Sleep apnea     uses only O2 QHS     Past Surgical History:   Procedure Laterality Date   • ABDOMINAL AORTIC ANEURYSM REPAIR WITH ENDOGRAFT N/A 9/23/2019    Procedure: ENDOVASCULAR AORTIC ANEURYSM REPAIR WITH GORE (EVAR);  Surgeon: Dewayne Victoria MD;  Location: [facility] OR;  Service: Vascular   • BRONCHOSCOPY  1/12/17-[facility]    Dr. Moreno   • CATARACT EXTRACTION, BILATERAL     • COLONOSCOPY  08/23/2011    w/EGD-Dr. Tse   • COLONOSCOPY  10/2006    w/EGD-Diony Moy MD   • TONSILLECTOMY       General Information     Row Name 07/12/20 1236          PT Evaluation Time/Intention    Document Type  therapy note (daily note)  -     Mode of Treatment  physical therapy  -     Row Name 07/12/20 1236          General Information    Patient Profile Reviewed?  yes  -     Row Name 07/12/20 1236          Cognitive Assessment/Intervention- PT/OT    Orientation Status (Cognition)  oriented x 4  -       User Key  (r) = Recorded By, (t) = Taken By, (c) = Cosigned By    Initials Name Provider Type    WC Teri Malik, PT Physical Therapist        Mobility     Row Name 07/12/20 1236          Sit-Stand Transfer    Sit-Stand  [place] (Transfers)  supervision  -     Assistive Device (Sit-Stand Transfers)  walker, 4-wheeled  -WC     Row Name 07/12/20 1236          Gait/Stairs Assessment/Training    [place] Level (Gait)  supervision  -     Assistive Device (Gait)  walker, 4-wheeled  -     Distance in Feet (Gait)  20 feet x 2  -WC     Right Sided Gait Deviations  forward flexed posture;heel strike decreased  -       User Key  (r) = Recorded By, (t) = Taken By, (c) = Cosigned By    Initials Name Provider Type    Teri Mcgill PT Physical Therapist        Obj/Interventions     Row Name 07/12/20 1237          Static Sitting Balance    Level of [place] (Unsupported Sitting, Static Balance)  independent  -     Sitting Position (Unsupported Sitting, Static Balance)  sitting in chair  -     Time Able to Maintain Position (Unsupported Sitting, Static Balance)  more than 5 minutes  -     Row Name 07/12/20 1237          Dynamic Sitting Balance    Level of [place], Reaches Outside Midline (Sitting, Dynamic Balance)  independent  -     Sitting Position, Reaches Outside Midline (Sitting, Dynamic Balance)  sitting in chair  -     Row Name 07/12/20 1237          Static Standing Balance    Level of [place] (Supported Standing, Static Balance)  conditional independence  -     Assistive Device Utilized (Supported Standing, Static Balance)  walker, rolling  -     Row Name 07/12/20 1237          Dynamic Standing Balance    Level of [place], Reaches Outside Midline (Standing, Dynamic Balance)  conditional independence  -     Time Able to Maintain Position, Reaches Outside Midline (Standing, Dynamic Balance)  more than 5 minutes  -     Assistive Device Utilized (Supported Standing, Dynamic Balance)  walker, rolling  -       User Key  (r) = Recorded By, (t) = Taken By, (c) = Cosigned By    Initials Name Provider Type    Teri Mcgill PT Physical Therapist        Goals/Plan    No documentation.        Clinical Impression     Row Name 07/12/20 1238          Pain Assessment    Additional Documentation  Pain Scale: Numbers Pre/Post-Treatment (Group)  -     Row Name 07/12/20 1238          Pain Scale: Numbers Pre/Post-Treatment    Pain Scale: Numbers, Pretreatment  0/10 - no pain  -     Pain Scale: Numbers, Post-Treatment  0/10 - no pain  -     Row Name 07/12/20 1238          Plan of Care Review    Plan of Care Reviewed With  patient  -     Row Name 07/12/20 1238          Physical Therapy Clinical Impression    Criteria for Skilled Interventions Met (PT Clinical Impression)  yes;treatment indicated  -WC     Predicted Duration of Therapy (PT)  until D/c   -WC     Row Name 07/12/20 1238          Vital Signs    O2 Delivery Pre Treatment  supplemental O2  -WC     O2 Delivery Intra Treatment  supplemental O2  -WC     O2 Delivery Post Treatment  supplemental O2  -WC     Pre Patient Position  Sitting  -WC     Intra Patient Position  Standing  -WC     Post Patient Position  Sitting  -     Row Name 07/12/20 1238          Positioning and Restraints    Pre-Treatment Position  sitting in chair/recliner  -WC     Post Treatment Position  chair  -       User Key  (r) = Recorded By, (t) = Taken By, (c) = Cosigned By    Initials Name Provider Type    WC Teri Malik, PT Physical Therapist        Outcome Measures     Row Name 07/12/20 1239          How much help from another person do you currently need...    Turning from your back to your side while in flat bed without using bedrails?  4  -WC     Moving from lying on back to sitting on the side of a flat bed without bedrails?  4  -WC     Moving to and from a bed to a chair (including a wheelchair)?  4  -WC     Standing up from a chair using your arms (e.g., wheelchair, bedside chair)?  4  -WC     Climbing 3-5 steps with a railing?  3  -WC     To walk in hospital room?  3  -WC     AM-PAC 6 Clicks Score (PT)  22  -     Row Name 07/12/20 1239          Functional  Assessment    Outcome Measure Options  AM-PAC 6 Clicks Basic Mobility (PT)  -       User Key  (r) = Recorded By, (t) = Taken By, (c) = Cosigned By    Initials Name Provider Type    WC Teri Malik PT Physical Therapist        Physical Therapy Education                 Title: PT OT SLP Therapies (Done)     Topic: Physical Therapy (Done)     Point: Mobility training (Done)     Description:   Instruct learner(s) on safety and technique for assisting patient out of bed, chair or wheelchair.  Instruct in the proper use of assistive devices, such as walker, crutches, cane or brace.              Patient Friendly Description:   It's important to get you on your feet again, but we need to do so in a way that is safe for you. Falling has serious consequences, and your personal safety is the most important thing of all.        When it's time to get out of bed, one of us or a family member will sit next to you on the bed to give you support.     If your doctor or nurse tells you to use a walker, crutches, a cane, or a brace, be sure you use it every time you get out of bed, even if you think you don't need it.    Learning Progress Summary           Patient Acceptance, E,TB, VU by  at 7/12/2020 1239    Acceptance, TB, DU by  at 7/11/2020 1014    Acceptance, E, NR by CHINO at 7/10/2020 1452    Acceptance, E, VU by  at 7/10/2020 1420                               User Key     Initials Effective Dates Name Provider Type Discipline     06/19/19 -  Marlena Schaeffer PT Physical Therapist PT     03/01/19 -  Rosie Gooden RN Registered Nurse Nurse    CHINO 03/25/19 -  Jemima Gonzalez, RN Registered Nurse Nurse     01/07/20 -  Teri Malik PT Physical Therapist PT              PT Recommendation and Plan     Outcome Summary/Treatment Plan (PT)  Anticipated Discharge Disposition (PT): home with assist  Plan of Care Reviewed With: patient  Outcome Summary: Pt was alert and oriented x 4. Pt had no c/o pain. Pt stood with  rollator with conditional independence, ambulated 20 feet x 2. Pt followed VC and visual cues for standing exercise toe touch front x 10 alternating Le's. Recommendation is home with assist. PPE worn: mask, eyeshield, gloves.     Time Calculation:   PT Charges     Row Name 07/12/20 1235 07/12/20 1234          Time Calculation    Start Time  --  1115  -WC     Stop Time  --  1135  -WC     Time Calculation (min)  --  20 min  -WC     PT Received On  --  07/12/20  -     PT - Next Appointment  07/13/20  -  --        Time Calculation- PT    TCU Minutes- PT  --  20 min  -WC       User Key  (r) = Recorded By, (t) = Taken By, (c) = Cosigned By    Initials Name Provider Type    Teri Mcgill PT Physical Therapist        Therapy Charges for Today     Code Description Service Date Service Provider Modifiers Qty    64901238675 HC PT THER PROC EA 15 MIN 7/12/2020 Teri Malik, PT GP 1    14629150981 HC GAIT TRAINING EA 15 MIN 7/12/2020 Teri Malik, PT GP 1          PT G-Codes  Outcome Measure Options: AM-PAC 6 Clicks Basic Mobility (PT)  AM-PAC 6 Clicks Score (PT): 22    Teri Malik PT  7/12/2020

## 2020-07-13 ENCOUNTER — READMISSION MANAGEMENT (OUTPATIENT)
Dept: CALL CENTER | Facility: HOSPITAL | Age: 80
End: 2020-07-13

## 2020-07-13 NOTE — DISCHARGE SUMMARY
[facility] Medicine Services  DISCHARGE SUMMARY        Prepared For PCP:  Valeriano Lewis MD    Patient Name: Giles Frost  : 1940  MRN: [id number]      Date of Admission:   2020    Date of Discharge:  2020    Length of stay:  LOS: 4 days     Hospital Course     Presenting Problem:   Ascending aortic aneurysm (CMS/HCC) [I71.2]  PAF (paroxysmal atrial fibrillation) (CMS/HCC) [I48.0]  Alcoholic cirrhosis of liver without ascites (CMS/HCC) [K70.30]  Biventricular congestive heart failure (CMS/HCC) [I50.82]  Ascending aortic aneurysm (CMS/HCC) [I71.2]  Ascending aortic aneurysm (CMS/HCC) [I71.2]      Active Hospital Problems    Diagnosis  POA   • **Systolic CHF, acute (CMS/HCC) [I50.21]  Yes   • RLS (restless legs syndrome) [G25.81]  Yes   • DM (diabetes mellitus) (CMS/HCC) [E11.9]  Yes   • Anxiety disorder [F41.9]  Yes   • Hypomagnesemia [E83.42]  Yes   • Abdominal distention [R14.0]  Yes   • Biventricular congestive heart failure (CMS/HCC) [I50.82]  Yes   • Alcoholic cirrhosis of liver without ascites (CMS/HCC) [K70.30]  Unknown   • Ascending aortic aneurysm (CMS/HCC) [I71.2]  Unknown   • PAF (paroxysmal atrial fibrillation) (CMS/HCC) [I48.0]  No   • COPD mixed type (CMS/HCC) [J44.9]  Yes      Resolved Hospital Problems   No resolved problems to display.       Hospital Course:  Giles Frost is a 79 y.o. male admitted with:     Shortness of breath secondary to Acute biventricular systolic CHF exacerbation  - Last 2D echo on 2020 showed Estimated EF = 35%.  - COVID test on 2020-  - pro BNP 2453- monitor   - Continue IV lasix, monitor BMP   - Cardiology consulted- s/p cath & OPAL   - Cath showing multi-vessel CAD, not candidate for surgery      Abdominal distention  -In setting of heart failure   -Will consider RUQ US     Alcoholic cirrhosis of the liver  - Check CMP  - Follow-up with GI outpatient      AAA S/P endovascular abdominal aortic repair on 2019     - Follow up with Vascular outpatient      Paroxysmal atrial fibrillation  -Continue diltiazem and digoxin  -Continuous cardiac monitoring  -Check EKG, irregular heartbeat noted on exam   -Coumadin re-started     Diabetes mellitus type 2  -Start sliding scale, Accu-Cheks  -Hemoglobin A1c  -On home metformin     COPD  -Not in exacerbation  -Patient currently on 2 L nasal cannula of oxygen and does not usually wear this during the day at home but has for the last 3 weeks-likely due to CHF  -Continue home breathing treatments     Anxiety  -Stable  -Continue Xanax (INSPECT verified)      Restless leg syndrome  -Continue Requip     Chronic hypomagnesemia  -Check mag  -On mag ox at home, electrolyte protocol ordered for now     VTE Prophylaxis - Coumadin     Patient with overall worsening heart function likely secondary to underlying medical conditions (multifactorial secondary to COPD, non-adherence with CPAP therapy, states cannot tolerate mask, and multivessel CAD and heart failure. Extensive discussion at bedside with patient and his wife over the phone---patient was deemed high risk for CABG per CT surgery, at this time, patient defers option of possible PCI w/ stenting to outpatient basis with cardiology follow-up after working with physical therapy at home to improve underlying functional status. Patient's wife states that over the past few months, patient has become much less active and more deconditioned which may also have significantly contributed to some of his symptoms on admission. Patient returned to previous baseline functioning prior to discharge, and will follow up with cardiology on outpatient basis in regards to possible limited PCI and stenting.     Recommendation for Outpatient Providers:             Reasons For Change In Medications and Indications for New Medications:        Day of Discharge     HPI:   No complaints     Vital Signs:   Vitals:    07/12/20 1416   BP: 116/63   Pulse: 81   Resp: 20    Temp: 97.4 °F (36.3 °C)   SpO2: 99%     Stable    Physical Exam:  Physical Exam  Unchanged from previously documented physical exam by hospitalist provider     Pertinent  and/or Most Recent Results     Results from last 7 days   Lab Units 07/12/20  0300 07/11/20  0334 07/10/20  0451 07/09/20  0336 07/08/20  1142 07/08/20  1018 07/06/20  1023   WBC 10*3/mm3  --   --  8.00 7.00 6.60  --  7.47   HEMOGLOBIN g/dL  --   --  13.0 13.2 12.9*  --  13.0   HEMATOCRIT %  --   --  40.3 40.2 39.3  --  39.7   PLATELETS 10*3/mm3  --   --  254 218 207  --  223   SODIUM mmol/L  --   --  138 142  --  139 138   POTASSIUM mmol/L 3.2* 3.5 3.4* 3.7  --  3.9 4.2   CHLORIDE mmol/L  --   --  91* 94*  --  95* 94*   CO2 mmol/L  --   --  35.0* 37.0*  --  33.0* 31.8*   BUN   --   --  17 17  --  19 18   CREATININE mg/dL  --   --  1.09 1.05  --  1.01 1.11   GLUCOSE mg/dL  --   --  119* 110*  --  119* 132*   CALCIUM mg/dL  --   --  9.9 9.7  --  9.5 10.3     Results from last 7 days   Lab Units 07/12/20  0300 07/11/20  0334 07/10/20  0842 07/10/20  0451 07/09/20  0825 07/09/20  0336 07/08/20  1142 07/08/20  1018 07/06/20  1023   BILIRUBIN mg/dL  --   --  1.2  --   --  0.9  --  0.9  --    ALK PHOS U/L  --   --  93  --   --  92  --  87  --    ALT (SGPT) U/L  --   --  12  --   --  10  --  11  --    AST (SGOT) U/L  --   --  27  --   --  20  --  23  --    PROTIME Seconds 12.0* 12.5*  --  12.6* 12.8*  --  13.1*  --  16.4*   INR  1.18* 1.23*  --  1.25* 1.30*  --  1.30*  --  1.69*           Invalid input(s): TG, LDLCALC, LDLREALC  Results from last 7 days   Lab Units 07/09/20  0336 07/08/20  1529 07/08/20  1142 07/08/20  1018   HEMOGLOBIN A1C %  --   --  6.8*  --    PROBNP pg/mL 2,587.0*  --   --  2,453.0*   TROPONIN T ng/mL  --  0.032* 0.028 0.029       Brief Urine Lab Results     None          Microbiology Results Abnormal     None          Xr Abdomen Kub    Result Date: 7/8/2020  Impression: Unremarkable bowel gas pattern.  Electronically Signed  By-Tim Baltazar On:7/8/2020 1:33 PM This report was finalized on [id number] by  Tim Baltazar, .    Xr Chest Pa & Lateral    Result Date: 7/8/2020  Impression: 1. Chronic left lower lobe bronchial wall thickening with interstitial opacity which may represent infection and bronchitis/bronchiolitis. 2. Chronic blunting of the right costophrenic angle likely related to a trace effusion or scarring. 3. Upper lobe predominant emphysema with biapical pleural-parenchymal scarring.  Electronically Signed By-Elbert Bruner On:7/8/2020 1:35 PM This report was finalized on [id number] by  Elbert Bruner, .                Results for orders placed during the hospital encounter of 07/08/20   Adult Transesophageal Echo (OPAL) W/ Cont if Necessary Per Protocol (Cardiology Department)    Narrative · Estimated EF = 40%.     LV dysfunction EF is around 35 to 40%  RV dilatation noted with decreased function  Left atrium enlarged and right atrium severely enlarged  Interatrial septum is intact bubble study negative  Left atrial appendage no clot  Mitral leaflets thickened and sclerosed there is mild to moderate mitral   insufficiency no vegetations  Tricuspid leaflets structurally appears normal  There is severe tricuspid insufficiency noted  Aortic leaflet sclerosis without any stenosis or insufficiency  Aortic evaluation mild to moderate atheroma noted                 Test Results Pending at Discharge        Procedures Performed  Procedure(s):  Left Heart Cath  Coronary angiography         Consults:   Consults     Date and Time Order Name Status Description    7/9/2020 0941 Inpatient Pulmonology Consult Completed             Discharge Details        Discharge Medications      New Medications      Instructions Start Date   mirtazapine 15 MG tablet  Commonly known as:  Remeron   15 mg, Oral, Nightly         Continue These Medications      Instructions Start Date   acetaminophen 325 MG tablet  Commonly known as:  TYLENOL   650 mg,  Oral, Every 6 Hours PRN      ALPRAZolam 0.25 MG tablet  Commonly known as:  XANAX   2 tablets, Oral, Every Night at Bedtime      Artificial Tears 0.1-0.3 % solution  Generic drug:  Dextran 70-Hypromellose   2 drops, Ophthalmic, 2 Times Daily PRN      Blue-Emu Super Strength cream   Apply externally      Cardizem 30 MG tablet  Generic drug:  dilTIAZem   15 mg, Oral, 2 times daily, Take day of surgery      digoxin 125 MCG tablet  Commonly known as:  LANOXIN   125 mcg, Oral, Every Morning, Take day of surgery      ipratropium-albuterol  MCG/ACT inhaler  Commonly known as:  COMBIVENT RESPIMAT   1 puff, Inhalation, 4 Times Daily PRN      ipratropium-albuterol 0.5-2.5 mg/3 ml nebulizer  Commonly known as:  DUO-NEB   3 mL, Nebulization, 4 Times Daily - RT      Klor-Con 20 MEQ packet  Generic drug:  potassium chloride   20 mEq, Oral, Daily, Take day of surgery      Lasix 40 MG tablet  Generic drug:  furosemide   80 mg, Oral, Daily With Breakfast, Do not take day of surgery      magnesium oxide 400 (241.3 Mg) MG tablet tablet  Commonly known as:  MAGOX   400 mg, Oral, Daily PRN      Fortamet 500 MG (OSM) 24 hr tablet  Generic drug:  metFORMIN   1,000 mg, Oral, Every Night at Bedtime, Do not take 48 hours prior to surgery      metFORMIN  MG 24 hr tablet  Commonly known as:  GLUCOPHAGE-XR   500 mg, Oral, Every Morning, Do not take 48 hours prior to surgery.  Last dose to be 09/21 before 0800      Qvar 40 MCG/ACT inhaler  Generic drug:  beclomethasone   2 puffs, Inhalation, 2 Times Daily      rOPINIRole 0.5 MG tablet  Commonly known as:  REQUIP   0.5 mg, Oral, 2 Times Daily      warfarin 3 MG tablet  Commonly known as:  COUMADIN   3 mg, Oral, Daily Warfarin, Take 3 mg on Tuesdays, Thursdays, Saturdays and Sundays       warfarin 1.5 MG half tablet  Commonly known as:  COUMADIN   1.5 mg, Oral, Daily Warfarin, Mondays, Wednesdays and Fridays              Allergies   Allergen Reactions   • Gabapentin Other (See  Comments) and Confusion     Vision loss           Discharge Disposition:  Home-Health Care Svc    Diet:  Hospital:No active diet order        Discharge Activity:     Activity as tolerated     CODE STATUS:    Code Status and Medical Interventions:   Ordered at: 07/08/20 1046     Level Of Support Discussed With:    Patient     Code Status:    CPR     Medical Interventions (Level of Support Prior to Arrest):    Full         Follow-up Appointments  Future Appointments   Date Time Provider Department Center   11/17/2020  8:30 AM [facility]  [facility]   11/17/2020  9:20 AM ROOM 2,  [facility]  [facility] None   12/18/2020  9:40 AM Qamar Lau MD [facility]             Condition on Discharge:      Stable          Electronically signed by Gwen Hager MD, 07/13/20, 5:05 AM.    Time: I spent  45 minutes on this discharge activity which included face-to-face encounter with the patient/reviewing the data in the system/coordination of the care with the nursing staff as well as consultants/documentation/entering orders.

## 2020-07-13 NOTE — OUTREACH NOTE
Prep Survey      Responses   [facility] facility patient discharged from?  [facility]   Is LACE score < 7 ?  No   Eligibility  Readm Mgmt   Discharge diagnosis  **Systolic CHF, acute    Does the patient have one of the following disease processes/diagnoses(primary or secondary)?  CHF   Does the patient have Home health ordered?  No   Is there a DME ordered?  No   Prep survey completed?  Yes          She Santos RN

## 2020-07-15 ENCOUNTER — READMISSION MANAGEMENT (OUTPATIENT)
Dept: CALL CENTER | Facility: HOSPITAL | Age: 80
End: 2020-07-15

## 2020-07-15 NOTE — OUTREACH NOTE
CHF Week 1 Survey      Responses   [facility] patient discharged from?  [facility]   Does the patient have one of the following disease processes/diagnoses(primary or secondary)?  CHF   CHF Week 1 attempt successful?  Yes   Call start time  1135   Call end time  1147   Discharge diagnosis  **Systolic CHF, acute    Meds reviewed with patient/caregiver?  Yes   Is the patient having any side effects they believe may be caused by any medication additions or changes?  Yes   Side effects comments   Pt that he was prescribed made him confused and hazy. He was unable to walk or function. He decided he will not take it again.    Does the patient have all medications ordered at discharge?  Yes   Is the patient taking all medications as directed (includes completed medication regime)?  No   What is preventing the patient from taking all medications as directed?  Side effects   Nursing Interventions  Advised patient to call provider   Does the patient have a primary care provider?   Yes   Does the patient have an appointment with their PCP within 7 days of discharge?  No   What is preventing the patient from scheduling follow up appointments within 7 days of discharge?  Haven't had time   Nursing Interventions  Advised patient to make appointment   Has the patient kept scheduled appointments due by today?  N/A   Pulse Ox monitoring  None   Psychosocial issues?  No   Comments  Pt reports feeling his SOA is better. he limits his NA intake and eats healthy per his report.    Did the patient receive a copy of their discharge instructions?  Yes   Nursing interventions  Reviewed instructions with patient   What is the patient's perception of their health status since discharge?  Improving   Nursing interventions  Nurse provided patient education   Is the patient weighing daily?  Yes   Does the patient have scales?  Yes   Daily weight interventions  Education provided on importance of daily weight   Is the patient able to teach back  Heart Failure diet management?  Yes   Is the patient able to teach back signs and symptoms of worsening condition? (i.e. weight gain, shortness of air, etc.)  Yes   Is the patient/caregiver able to teach back the hierarchy of who to call/visit for symptoms/problems? PCP, Specialist, Home health nurse, Urgent Care, ED, 911  Yes    CHF Week 1 call completed?  Yes          Sary Esquivel RN

## 2020-07-22 ENCOUNTER — READMISSION MANAGEMENT (OUTPATIENT)
Dept: CALL CENTER | Facility: HOSPITAL | Age: 80
End: 2020-07-22

## 2020-07-22 NOTE — OUTREACH NOTE
CHF Week 2 Survey      Responses   [facility] patient discharged from?  [facility]   Does the patient have one of the following disease processes/diagnoses(primary or secondary)?  CHF   Week 2 attempt successful?  Yes   Call start time  1752   Call end time  1757   Discharge diagnosis  **Systolic CHF, acute    Meds reviewed with patient/caregiver?  Yes   Is the patient having any side effects they believe may be caused by any medication additions or changes?  Yes   Side effects comments   Pt states he notified his doctor regarding the side effects and he is not taking Remeron    Is the patient taking all medications as directed (includes completed medication regime)?  No   Medication comments  Patient has notified his provided that he is not taking Remeron   Does the patient have a primary care provider?   Yes   Does the patient have an appointment with their PCP within 7 days of discharge?  Greater than 7 days   What is preventing the patient from scheduling follow up appointments within 7 days of discharge?  Earlier appointment not available   Has the patient kept scheduled appointments due by today?  Yes   Comments  State he went to have labs done today   Has home health visited the patient within 72 hours of discharge?  N/A   Pulse Ox monitoring  Intermittent   Pulse Ox device source  Patient   O2 Sat comments  States it has been running around 96% without his oxygen   O2 Sat: education provided  Sat levels, Monitoring frequency, When to seek care   Did the patient receive a copy of their discharge instructions?  Yes   Nursing interventions  Reviewed instructions with patient   What is the patient's perception of their health status since discharge?  Improving   Nursing interventions  Nurse provided patient education   Is the patient weighing daily?  Yes   Does the patient have scales?  Yes   Daily weight interventions  Education provided on importance of daily weight   Is the patient able to teach back Heart  Failure diet management?  Yes   Is the patient able to teach back Heart Failure Zones?  Yes   Is the patient able to teach back signs and symptoms of worsening condition? (i.e. weight gain, shortness of air, etc.)  Yes   Is the patient/caregiver able to teach back the hierarchy of who to call/visit for symptoms/problems? PCP, Specialist, Home health nurse, Urgent Care, ED, 911  Yes   Additional teach back comments  Patient states he is doing well.  He has not hac any weight gain.  Has not needing his oxygen and checks his sat levels regularly and they have been staying around 96%.     CHF Week 2 call completed?  Yes   Wrap up additional comments  Denies questions or needs at this time          Viola Molina LPN

## 2020-07-28 ENCOUNTER — READMISSION MANAGEMENT (OUTPATIENT)
Dept: CALL CENTER | Facility: HOSPITAL | Age: 80
End: 2020-07-28

## 2020-07-28 NOTE — OUTREACH NOTE
"CHF Week 3 Survey      Responses   [facility] patient discharged from?  [facility]   Does the patient have one of the following disease processes/diagnoses(primary or secondary)?  CHF   Week 3 attempt successful?  Yes   Call start time  1545   Call end time  1550   Meds reviewed with patient/caregiver?  Yes   Is the patient having any side effects they believe may be caused by any medication additions or changes?  Yes   Side effects comments   PATIENT STOPPED REMERON STATING, \"IT'S HORRIBLE STUFF.\"   Does the patient have all medications ordered at discharge?  Yes   Is the patient taking all medications as directed (includes completed medication regime)?  No   What is preventing the patient from taking all medications as directed?  Side effects   Does the patient have a primary care provider?   Yes   Has the patient kept scheduled appointments due by today?  Yes   Has home health visited the patient within 72 hours of discharge?  N/A   Pulse Ox monitoring  Intermittent   Pulse Ox device source  Patient   O2 Sat: education provided  Sat levels, Monitoring frequency   Did the patient receive a copy of their discharge instructions?  Yes   Nursing interventions  Reviewed instructions with patient   What is the patient's perception of their health status since discharge?  Improving   Nursing interventions  Nurse provided patient education   Is the patient weighing daily?  Yes   Does the patient have scales?  Yes   Daily weight interventions  Education provided on importance of daily weight   Is the patient able to teach back Heart Failure diet management?  Yes   Is the patient able to teach back Heart Failure Zones?  Yes   Is the patient able to teach back signs and symptoms of worsening condition? (i.e. weight gain, shortness of air, etc.)  Yes   Is the patient/caregiver able to teach back the hierarchy of who to call/visit for symptoms/problems? PCP, Specialist, Home health nurse, Urgent Care, ED, 911  Yes   CHF Week " 3 call completed?  Yes          Alecia Schaeffer LPN

## 2020-08-03 ENCOUNTER — OFFICE VISIT (OUTPATIENT)
Dept: CARDIOLOGY | Facility: CLINIC | Age: 80
End: 2020-08-03

## 2020-08-03 VITALS
OXYGEN SATURATION: 74 % | WEIGHT: 167 LBS | DIASTOLIC BLOOD PRESSURE: 60 MMHG | BODY MASS INDEX: 23.91 KG/M2 | SYSTOLIC BLOOD PRESSURE: 108 MMHG | HEART RATE: 94 BPM | HEIGHT: 70 IN

## 2020-08-03 DIAGNOSIS — I48.0 PAF (PAROXYSMAL ATRIAL FIBRILLATION) (HCC): Chronic | ICD-10-CM

## 2020-08-03 DIAGNOSIS — E11.9 TYPE 2 DIABETES MELLITUS WITHOUT COMPLICATION, WITHOUT LONG-TERM CURRENT USE OF INSULIN (HCC): Chronic | ICD-10-CM

## 2020-08-03 DIAGNOSIS — I25.10 CORONARY ARTERY DISEASE INVOLVING NATIVE CORONARY ARTERY OF NATIVE HEART WITHOUT ANGINA PECTORIS: ICD-10-CM

## 2020-08-03 DIAGNOSIS — K70.30 ALCOHOLIC CIRRHOSIS OF LIVER WITHOUT ASCITES (HCC): Chronic | ICD-10-CM

## 2020-08-03 DIAGNOSIS — I50.82 BIVENTRICULAR CONGESTIVE HEART FAILURE (HCC): Primary | Chronic | ICD-10-CM

## 2020-08-03 DIAGNOSIS — I71.21 ASCENDING AORTIC ANEURYSM (HCC): Chronic | ICD-10-CM

## 2020-08-03 PROCEDURE — 99214 OFFICE O/P EST MOD 30 MIN: CPT | Performed by: INTERNAL MEDICINE

## 2020-08-03 NOTE — PROGRESS NOTES
Subjective:     Encounter Date:08/03/2020      Patient ID: Giles Frost is a 79 y.o. male.    Chief Complaint: Follow-up AAA CAD  History of Present Illness   79-year-old white male patient with known history of COPD paroxysmal atrial fibrillation ascending aortic root aneurysm abdominal aneurysm comes back for follow-up  2019 underwent abdominal aortic aneurysm repair  Patient still have ascending aortic root aneurysm up to 5 cm     Stress Myoview July 2019 reverse redistribution no ischemia  Echocardiogram showed moderate mitral insufficiency June 2019 RV enlargement severe pulmonary hypertension     Echocardiogram was repeated June 9, 2020  LVEF 35%  RV is severely dilated  Reduced RV function  Severe tricuspid insufficiency      July 2020 transesophageal echocardiogram EF 35 to 40%  Left atrium is enlarged and right atrium severely enlarged  Interatrial septum is intact bubble study negative    Cardiac catheterization July 2020 severe coronary calcification and severe multivessel disease which is not easily amenable for intervention  Severe pulmonary hypertension on the echocardiogram  Patient was seen by CV surgery and was decided not a candidate for surgery very high operative mortality  Patient comes back today he would like to speak with CV surgery at least once since he did not get a chance during the hospitalization  Advised him to see Dr. wynn 1 more time  If he is definitely not a candidate may be evaluated for limited PCI which will be also very high risk procedure  Patient denies of any symptoms of chest pain or shortness of breath now we will try medical treatment  Suggest ACE inhibitor's in the future if blood pressure can tolerate also will try beta-blockers instead of Cardizem if possible but patient had problems previously    The following portions of the patient's history were reviewed and updated as appropriate: Allergies current medications past family history past medical history past  social history past surgical history problem list and review of systems  Past Medical History:   Diagnosis Date   • AAA (abdominal aortic aneurysm) (CMS/HCC)    • Alcohol abuse     Hx   • Alcoholic cirrhosis (CMS/HCC)    • Ankle edema, bilateral     at times   • Anxiety     Welbutrin   • Anxiety    • Aortic aneurysm (CMS/HCC) 09/19/2018    Ascending Measuring 4.7CM Noted on CT Chest   • Atrial fibrillation (CMS/HCC)    • Cardiomegaly 09/19/2018    CT Chest   • Centrilobular emphysema (CMS/HCC) 09/19/2018    On CT Chest   • CHF (congestive heart failure) (CMS/HCC)    • Cholecystolithiasis 09/19/2018    On CT Chest   • Chronic liver disease    • COPD (chronic obstructive pulmonary disease) (CMS/HCC)    • Coronary artery disease    • Depression    • Descending thoracic aortic aneurysm (CMS/HCC) 09/19/2018    On Chest CT-4.2CM Near Diaphragmatic Hiatus and 4.8CM at Main Pulmonary Artery   • Diabetes mellitus (CMS/HCC)    • Diarrhea    • History of echocardiogram 12/20/16 & 8/2/17-[facility]   • History of EKG 12/2016    Atrial Fib   • Hx of chest x-ray 12/18/2016    1 V   • Hx of chest x-ray 08/2011   • Hx of CT scan of chest 09/19/18-[facility]    Enlarged Heart Size; Coronary Artery and Aortic Atherosclerotic Calcifications; Ascending Aorta Measuring 4.7CM;    • Long term current use of anticoagulant    • On home oxygen therapy     2 LPM QHS   • Paraseptal emphysema (CMS/HCC) 09/19/2018    On CT Chest   • Pleural nodule 09/19/2018    On CT Chest-6MM   • Pneumonia 06/2019    Hx   • Pulmonary hypertension (CMS/HCC)    • Pulmonary nodules 09/19/2018    R on CT Chest   • Respiratory failure (CMS/HCC) Hypoxic   • Sleep apnea     uses only O2 QHS     Past Surgical History:   Procedure Laterality Date   • ABDOMINAL AORTIC ANEURYSM REPAIR WITH ENDOGRAFT N/A 9/23/2019    Procedure: ENDOVASCULAR AORTIC ANEURYSM REPAIR WITH GORE (EVAR);  Surgeon: Dewayne Victoria MD;  Location: [facility] OR;  Service: Vascular   • BRONCHOSCOPY   "1/12/17- [facility]    Dr. Draw   • CARDIAC CATHETERIZATION N/A 7/10/2020    Procedure: Left Heart Cath;  Surgeon: Qamar Lua MD;  Location: [facility] CATH INVASIVE LOCATION;  Service: Cardiovascular;  Laterality: N/A;   • CARDIAC CATHETERIZATION N/A 7/10/2020    Procedure: Coronary angiography;  Surgeon: Qamar Lau MD;  Location: [facility] CATH INVASIVE LOCATION;  Service: Cardiovascular;  Laterality: N/A;   • CATARACT EXTRACTION, BILATERAL     • COLONOSCOPY  08/23/2011    w/EGD-Dr. Tse   • COLONOSCOPY  10/2006    w/EGD-Diony Moy MD   • TONSILLECTOMY       /60   Pulse 94   Ht 177.8 cm (70\")   Wt 75.8 kg (167 lb)   SpO2 (!) 74%   BMI 23.96 kg/m²   Family History   Problem Relation Age of Onset   • Diabetes Sister        Current Outpatient Medications:   •  acetaminophen (TYLENOL) 325 MG tablet, Take 650 mg by mouth Every 6 (Six) Hours As Needed for Mild Pain ., Disp: , Rfl:   •  ALPRAZolam (XANAX) 0.25 MG tablet, Take 0.5 mg by mouth 3 (Three) Times a Day As Needed for Anxiety or Sleep., Disp: , Rfl: 2  •  beclomethasone (QVAR) 40 MCG/ACT inhaler, Inhale 2 puffs 2 (Two) Times a Day., Disp: , Rfl:   •  Dextran 70-Hypromellose (ARTIFICIAL TEARS) 0.1-0.3 % solution, Apply 2 drops to eye(s) as directed by provider 2 (Two) Times a Day As Needed., Disp: , Rfl:   •  digoxin (LANOXIN) 125 MCG tablet, Take 125 mcg by mouth Every Morning. Take day of surgery, Disp: , Rfl:   •  diltiaZEM (CARDIZEM) 30 MG tablet, Take 15 mg by mouth 2 (two) times a day. Take day of surgery, Disp: , Rfl:   •  furosemide (LASIX) 40 MG tablet, Take 80 mg by mouth Daily With Breakfast. Do not take day of surgery, Disp: , Rfl:   •  ipratropium-albuterol (COMBIVENT RESPIMAT)  MCG/ACT inhaler, Inhale 1 puff 4 (Four) Times a Day As Needed for Wheezing., Disp: , Rfl:   •  ipratropium-albuterol (DUO-NEB) 0.5-2.5 mg/3 ml nebulizer, Take 3 mL by nebulization 4 (Four) Times a Day., Disp: , Rfl:   •  " Liniments (BLUE-EMU SUPER STRENGTH) cream, Apply  topically., Disp: , Rfl:   •  magnesium oxide (MAGOX) 400 (241.3 Mg) MG tablet tablet, Take 400 mg by mouth Daily As Needed., Disp: , Rfl:   •  metFORMIN (FORTAMET) 500 MG (OSM) 24 hr tablet, Take 1,000 mg by mouth every night at bedtime. Do not take 48 hours prior to surgery, Disp: , Rfl:   •  metFORMIN ER (GLUCOPHAGE-XR) 500 MG 24 hr tablet, Take 500 mg by mouth Every Morning. Do not take 48 hours prior to surgery.  Last dose to be 09/21 before 0800, Disp: , Rfl: 5  •  potassium chloride (KLOR-CON) 20 MEQ packet, Take 20 mEq by mouth Daily. Take day of surgery, Disp: , Rfl:   •  rOPINIRole (REQUIP) 0.5 MG tablet, Take 0.5 mg by mouth 2 (Two) Times a Day., Disp: , Rfl: 4  •  warfarin (COUMADIN) 1.5 MG half tablet, Take 1.5 mg by mouth Daily. Mondays, Wednesdays and Fridays, Disp: , Rfl:   •  warfarin (COUMADIN) 3 MG tablet, Take 3 mg by mouth Daily. Take 3 mg on Tuesdays, Thursdays, Saturdays and Sundays, Disp: , Rfl:   •  mirtazapine (Remeron) 15 MG tablet, Take 1 tablet by mouth Every Night., Disp: 30 tablet, Rfl: 2  Social History     Socioeconomic History   • Marital status:      Spouse name: Monalisa   • Number of children: Not on file   • Years of education: Not on file   • Highest education level: Not on file   Occupational History   • Occupation: RETIRED   Tobacco Use   • Smoking status: Former Smoker     Years: 2.00     Types: Cigarettes   • Smokeless tobacco: Never Used   • Tobacco comment: 5 Cigs/Day x 58+ Yrs   Substance and Sexual Activity   • Alcohol use: No     Comment: Hx Alcohol Abuse   • Drug use: No   • Sexual activity: Defer     Allergies   Allergen Reactions   • Gabapentin Other (See Comments) and Confusion     Vision loss     • Mirtazapine Hallucinations     Review of Systems   Constitution: Negative for fever and malaise/fatigue.   HENT: Negative for congestion and hearing loss.    Eyes: Negative for double vision and visual  disturbance.   Cardiovascular: Negative for chest pain, claudication, dyspnea on exertion, leg swelling and syncope.   Respiratory: Negative for cough and shortness of breath.    Endocrine: Negative for cold intolerance.   Skin: Negative for color change and rash.   Musculoskeletal: Negative for arthritis and joint pain.   Gastrointestinal: Negative for abdominal pain and heartburn.   Genitourinary: Negative for hematuria.   Neurological: Negative for excessive daytime sleepiness and dizziness.   Psychiatric/Behavioral: Negative for depression. The patient is not nervous/anxious.    All other systems reviewed and are negative.             Objective:     Physical Exam   Constitutional: He is oriented to person, place, and time. He appears well-developed and well-nourished. He is cooperative.   HENT:   Head: Normocephalic and atraumatic.   Mouth/Throat: Uvula is midline and oropharynx is clear and moist. No oral lesions.   Eyes: Conjunctivae are normal. No scleral icterus.   Neck: Trachea normal. Neck supple. No JVD present. Carotid bruit is not present. No thyromegaly present.   Cardiovascular: Normal rate, S1 normal, S2 normal, intact distal pulses and normal pulses. An irregularly irregular rhythm present. PMI is not displaced. Exam reveals no gallop and no friction rub.   Murmur heard.  Pulmonary/Chest: Effort normal and breath sounds normal.   Abdominal: Soft. Bowel sounds are normal.   Musculoskeletal: Normal range of motion.   Neurological: He is alert and oriented to person, place, and time. He has normal strength.   No focal deficits   Skin: Skin is warm. No cyanosis.   Psychiatric: He has a normal mood and affect.       Procedures    Lab Review:       Assessment:          Diagnosis Plan   1. Biventricular congestive heart failure (CMS/HCC)     2. Ascending aortic aneurysm (CMS/HCC)     3. PAF (paroxysmal atrial fibrillation) (CMS/HCC)     4. Type 2 diabetes mellitus without complication, without long-term  current use of insulin (CMS/[facility])            Plan:       Biventricular LV dysfunction with congestive heart failure currently euvolemic  Severe complicated multivessel CAD and AAA  Patient would like 1 more appointment with CV surgery  In the future we will try beta-blockers ACE inhibitor's if his blood pressure and pulmonary status tolerate  Wean off Cardizem  Continue anticoagulation  Continue conservative treatment

## 2020-08-07 ENCOUNTER — READMISSION MANAGEMENT (OUTPATIENT)
Dept: CALL CENTER | Facility: HOSPITAL | Age: 80
End: 2020-08-07

## 2020-08-07 NOTE — OUTREACH NOTE
CHF Week 4 Survey      Responses   [facility] patient discharged from?  [facility]   Does the patient have one of the following disease processes/diagnoses(primary or secondary)?  CHF   Week 4 attempt successful?  Yes   Call start time  1712   Call end time  1724   Discharge diagnosis  Systolic CHF, acute    Is patient permission given to speak with other caregiver?  No   Meds reviewed with patient/caregiver?  Yes   Is the patient taking all medications as directed (includes completed medication regime)?  Yes   Has the patient kept scheduled appointments due by today?  Yes   Is the patient still receiving Home Health Services?  N/A   Pulse Ox monitoring  Intermittent   Pulse Ox device source  Patient   O2 Sat comments  Patient reports O2 sat in mid 90's.    O2 Sat: education provided  Sat levels, Monitoring frequency, When to seek care   Psychosocial issues?  No   What is the patient's perception of their health status since discharge?  Improving   Nursing interventions  Nurse provided patient education   Is the patient weighing daily?  Yes   Does the patient have scales?  Yes   Daily weight interventions  Education provided on importance of daily weight   Is the patient able to teach back Heart Failure diet management?  Yes   Is the patient able to teach back Heart Failure Zones?  Yes   Is the patient able to teach back signs and symptoms of worsening condition? (i.e. weight gain, shortness of air, etc.)  Yes   Is the patient/caregiver able to teach back the hierarchy of who to call/visit for symptoms/problems? PCP, Specialist, Home health nurse, Urgent Care, ED, 911  Yes   Week 4 Call Completed?  Yes   Would the patient like one additional call?  No   Graduated  Yes   Did the patient feel the follow up calls were helpful during their recovery period?  Yes   Was the number of calls appropriate?  Yes          Rebeca Varghese RN

## 2020-08-27 ENCOUNTER — OFFICE VISIT (OUTPATIENT)
Dept: CARDIAC SURGERY | Facility: CLINIC | Age: 80
End: 2020-08-27

## 2020-08-27 VITALS
RESPIRATION RATE: 20 BRPM | OXYGEN SATURATION: 87 % | DIASTOLIC BLOOD PRESSURE: 72 MMHG | HEART RATE: 72 BPM | SYSTOLIC BLOOD PRESSURE: 122 MMHG | HEIGHT: 70 IN | WEIGHT: 171 LBS | BODY MASS INDEX: 24.48 KG/M2

## 2020-08-27 DIAGNOSIS — I71.40 ABDOMINAL AORTIC ANEURYSM (AAA) WITHOUT RUPTURE (HCC): ICD-10-CM

## 2020-08-27 DIAGNOSIS — F41.9 ANXIETY DISORDER, UNSPECIFIED TYPE: Chronic | ICD-10-CM

## 2020-08-27 DIAGNOSIS — K70.30 ALCOHOLIC CIRRHOSIS OF LIVER WITHOUT ASCITES (HCC): Chronic | ICD-10-CM

## 2020-08-27 DIAGNOSIS — I36.1 NON-RHEUMATIC TRICUSPID VALVE INSUFFICIENCY: ICD-10-CM

## 2020-08-27 DIAGNOSIS — I48.0 PAF (PAROXYSMAL ATRIAL FIBRILLATION) (HCC): Chronic | ICD-10-CM

## 2020-08-27 DIAGNOSIS — R91.1 INCIDENTAL LUNG NODULE, > 3MM AND < 8MM: ICD-10-CM

## 2020-08-27 DIAGNOSIS — J44.9 COPD MIXED TYPE (HCC): Primary | ICD-10-CM

## 2020-08-27 DIAGNOSIS — I50.82 BIVENTRICULAR CONGESTIVE HEART FAILURE (HCC): Chronic | ICD-10-CM

## 2020-08-27 DIAGNOSIS — I71.21 ASCENDING AORTIC ANEURYSM (HCC): Chronic | ICD-10-CM

## 2020-08-27 PROCEDURE — 99214 OFFICE O/P EST MOD 30 MIN: CPT | Performed by: THORACIC SURGERY (CARDIOTHORACIC VASCULAR SURGERY)

## 2020-08-30 NOTE — PROGRESS NOTES
2020    Seen on 20    Chief Complaint:     Follow-up evaluation of ascending aortic aneurysm    History of Present Illness:       Dear Dr. Gramajo and Colleagues,  It was nice to see Giles Frost in follow up evaluation for his ascending aortic aneurysm. He is a 79 y.o. male with a history of multiple medical problems including severe COPD on home oxygen, advanced alcoholic cirrhosis, severe anxiety and  known 5 cm ascending aortic aneurysm.  He has atrial fibrillation and pulmonary hypertension.  He has severe tricuspid regurgitation and RV dysfunction.  He has a AAA that was repaired in the past.  He has also descending thoracic aortic aneurysm of approximately 5 cm.  He has a brother that  of a ruptured thoracic aneurysm with generates a lot of anxiety. He denies chest pain, abdominal pain, upper back pain, orthopnea, PND, syncope or TIA.. His last chest CT showed descending aortic aneurysm size without dissection or ulceration.  There is also an lung nodule that is unchanged.  She had a cardiac catheter showed severe cardiomyopathy and showed the LAD with a 90% stenosis and then 7090% stenosis with calcification in the midportion.  The circumflex had a 90% stenosis and it was 60% of the AV groove and the RCA had a mid to distal 80% stenosis.  He had a OPAL that showed ejection fraction (35%.  The RV dilatation was noted with significant decreased function.  The left atrium was enlarged.  There was mild to moderate mitral regurgitation and severe tricuspid regurgitation the aortic valve did not show any stenosis or significant insufficiency.  He is here for follow-up.    Patient Active Problem List   Diagnosis   • Ascending aortic aneurysm (CMS/HCC)   • Incidental lung nodule, > 3mm and < 8mm   • PAF (paroxysmal atrial fibrillation) (CMS/HCC)   • COPD mixed type (CMS/HCC)   • Abdominal aortic aneurysm (AAA) without rupture (CMS/HCC)   • Non-rheumatic tricuspid valve insufficiency   • Acute abdominal  pain   • Alcoholic cirrhosis of liver without ascites (CMS/HCC)   • AAA (abdominal aortic aneurysm) without rupture (CMS/HCC)   • Atrial fibrillation (CMS/HCC)   • Biventricular congestive heart failure (CMS/HCC)   • RLS (restless legs syndrome)   • DM (diabetes mellitus) (CMS/HCC)   • Anxiety disorder   • Hypomagnesemia   • Abdominal distention   • Systolic CHF, acute (CMS/HCC)       Past Medical History:   Diagnosis Date   • AAA (abdominal aortic aneurysm) (CMS/HCC)    • Alcohol abuse     Hx   • Alcoholic cirrhosis (CMS/HCC)    • Ankle edema, bilateral     at times   • Anxiety     Welbutrin   • Anxiety    • Aortic aneurysm (CMS/HCC) 09/19/2018    Ascending Measuring 4.7CM Noted on CT Chest   • Atrial fibrillation (CMS/HCC)    • Cardiomegaly 09/19/2018    CT Chest   • Centrilobular emphysema (CMS/HCC) 09/19/2018    On CT Chest   • CHF (congestive heart failure) (CMS/HCC)    • Cholecystolithiasis 09/19/2018    On CT Chest   • Chronic liver disease    • COPD (chronic obstructive pulmonary disease) (CMS/HCC)    • Coronary artery disease    • Depression    • Descending thoracic aortic aneurysm (CMS/HCC) 09/19/2018    On Chest CT-4.2CM Near Diaphragmatic Hiatus and 4.8CM at Main Pulmonary Artery   • Diabetes mellitus (CMS/HCC)    • Diarrhea    • History of echocardiogram 12/20/16 & 8/2/17- [facility]   • History of EKG 12/2016    Atrial Fib   • Hx of chest x-ray 12/18/2016    1 V   • Hx of chest x-ray 08/2011   • Hx of CT scan of chest 09/19/18-F    Enlarged Heart Size; Coronary Artery and Aortic Atherosclerotic Calcifications; Ascending Aorta Measuring 4.7CM;    • Long term current use of anticoagulant    • On home oxygen therapy     2 LPM QHS   • Paraseptal emphysema (CMS/HCC) 09/19/2018    On CT Chest   • Pleural nodule 09/19/2018    On CT Chest-6MM   • Pneumonia 06/2019    Hx   • Pulmonary hypertension (CMS/HCC)    • Pulmonary nodules 09/19/2018    R on CT Chest   • Respiratory failure (CMS/HCC) Hypoxic   • Sleep  apnea     uses only O2 QHS       Past Surgical History:   Procedure Laterality Date   • ABDOMINAL AORTIC ANEURYSM REPAIR WITH ENDOGRAFT N/A 9/23/2019    Procedure: ENDOVASCULAR AORTIC ANEURYSM REPAIR WITH GORE (EVAR);  Surgeon: Dewayne Victoria MD;  Location: [facility] MAIN OR;  Service: Vascular   [facility]   • CARDIAC CATHETERIZATION N/A 7/10/2020    Procedure: Left Heart Cath;  Surgeon: Qamar Lau MD;  Location: [facility] CATH INVASIVE LOCATION;  Service: Cardiovascular;  Laterality: N/A;   • CARDIAC CATHETERIZATION N/A 7/10/2020    Procedure: Coronary angiography;  Surgeon: Qamar Lau MD;  Location: [facility] CATH INVASIVE LOCATION;  Service: Cardiovascular;  Laterality: N/A;   • CATARACT EXTRACTION, BILATERAL     • COLONOSCOPY  08/23/2011    w/EGD-Dr. Tse   • COLONOSCOPY  10/2006    w/EGD-Diony Moy MD   • TONSILLECTOMY         Allergies   Allergen Reactions   • Gabapentin Other (See Comments) and Confusion     Vision loss     • Mirtazapine Hallucinations         Current Outpatient Medications:   •  acetaminophen (TYLENOL) 325 MG tablet, Take 650 mg by mouth Every 6 (Six) Hours As Needed for Mild Pain ., Disp: , Rfl:   •  ALPRAZolam (XANAX) 0.25 MG tablet, Take 0.5 mg by mouth 3 (Three) Times a Day As Needed for Anxiety or Sleep., Disp: , Rfl: 2  •  beclomethasone (QVAR) 40 MCG/ACT inhaler, Inhale 2 puffs 2 (Two) Times a Day., Disp: , Rfl:   •  Dextran 70-Hypromellose (ARTIFICIAL TEARS) 0.1-0.3 % solution, Apply 2 drops to eye(s) as directed by provider 2 (Two) Times a Day As Needed., Disp: , Rfl:   •  digoxin (LANOXIN) 125 MCG tablet, Take 125 mcg by mouth Every Morning. Take day of surgery, Disp: , Rfl:   •  diltiaZEM (CARDIZEM) 30 MG tablet, Take 15 mg by mouth 2 (two) times a day. Take day of surgery, Disp: , Rfl:   •  furosemide (LASIX) 40 MG tablet, Take 80 mg by mouth Daily With Breakfast. Do not take day of surgery, Disp: , Rfl:   •   ipratropium-albuterol (COMBIVENT RESPIMAT)  MCG/ACT inhaler, Inhale 1 puff 4 (Four) Times a Day As Needed for Wheezing., Disp: , Rfl:   •  ipratropium-albuterol (DUO-NEB) 0.5-2.5 mg/3 ml nebulizer, Take 3 mL by nebulization 4 (Four) Times a Day., Disp: , Rfl:   •  Liniments (BLUE-EMU SUPER STRENGTH) cream, Apply  topically., Disp: , Rfl:   •  magnesium oxide (MAGOX) 400 (241.3 Mg) MG tablet tablet, Take 400 mg by mouth Daily As Needed., Disp: , Rfl:   •  metFORMIN (FORTAMET) 500 MG (OSM) 24 hr tablet, Take 1,000 mg by mouth every night at bedtime. Do not take 48 hours prior to surgery, Disp: , Rfl:   •  metFORMIN ER (GLUCOPHAGE-XR) 500 MG 24 hr tablet, Take 500 mg by mouth Every Morning. Do not take 48 hours prior to surgery.  Last dose to be 09/21 before 0800, Disp: , Rfl: 5  •  mirtazapine (Remeron) 15 MG tablet, Take 1 tablet by mouth Every Night., Disp: 30 tablet, Rfl: 2  •  potassium chloride (KLOR-CON) 20 MEQ packet, Take 20 mEq by mouth Daily. Take day of surgery, Disp: , Rfl:   •  rOPINIRole (REQUIP) 0.5 MG tablet, Take 0.5 mg by mouth 2 (Two) Times a Day., Disp: , Rfl: 4  •  warfarin (COUMADIN) 1.5 MG half tablet, Take 1.5 mg by mouth Daily. Mondays, Wednesdays and Fridays, Disp: , Rfl:   •  warfarin (COUMADIN) 3 MG tablet, Take 3 mg by mouth Daily. Take 3 mg on Tuesdays, Thursdays, Saturdays and Sundays, Disp: , Rfl:     Social History     Socioeconomic History   • Marital status:      Spouse name: Monalisa   • Number of children: Not on file   • Years of education: Not on file   • Highest education level: Not on file   Occupational History   • Occupation: RETIRED   Tobacco Use   • Smoking status: Former Smoker     Years: 2.00     Types: Cigarettes   • Smokeless tobacco: Never Used   • Tobacco comment: 5 Cigs/Day x 58+ Yrs   Substance and Sexual Activity   • Alcohol use: No     Comment: Hx Alcohol Abuse   • Drug use: No   • Sexual activity: Defer       Family History   Problem Relation Age of  Onset   • Diabetes Sister      Review of Systems   Constitutional: Positive for fatigue.   Respiratory: Positive for shortness of breath and wheezing.    Genitourinary: Negative for dysuria.   Neurological: Positive for weakness. Negative for syncope.   All other systems reviewed and are negative.      Physical Exam:    Vital Signs:  Weight: 77.6 kg (171 lb)   Body mass index is 24.54 kg/m².      Heart Rate: 72   BP: 122/72     Physical Exam   Constitutional: He is oriented to person, place, and time. He appears well-developed and well-nourished.   HENT:   Head: Normocephalic and atraumatic.   Nose: Nose normal.   Mouth/Throat: Oropharynx is clear and moist.   Eyes: Pupils are equal, round, and reactive to light. Conjunctivae are normal.   Neck: Normal range of motion. Neck supple. No JVD present. No thyromegaly present.   Cardiovascular: Normal rate, regular rhythm, S1 normal, S2 normal and intact distal pulses. PMI is not displaced. Exam reveals no gallop and no friction rub.   Murmur heard.  Pulses:       Radial pulses are 2+ on the right side, and 2+ on the left side.        Dorsalis pedis pulses are 2+ on the right side, and 2+ on the left side.   Pulmonary/Chest: Effort normal. He has decreased breath sounds in the right lower field and the left lower field. He has wheezes in the right lower field. He has no rales.   Abdominal: Soft. Bowel sounds are normal. He exhibits no distension and no mass. There is no tenderness.   Musculoskeletal: Normal range of motion. He exhibits no tenderness or deformity.   Lymphadenopathy:     He has no cervical adenopathy.   Neurological: He is alert and oriented to person, place, and time. He has normal reflexes.   Skin: Skin is warm and dry. No rash noted. No erythema.   Psychiatric: He has a normal mood and affect. His behavior is normal.        Assessment:     Problem List Items Addressed This Visit        Cardiovascular and Mediastinum    Ascending aortic aneurysm (CMS/HCC)  (Chronic)    PAF (paroxysmal atrial fibrillation) (CMS/HCC) (Chronic)    Biventricular congestive heart failure (CMS/HCC) (Chronic)    Abdominal aortic aneurysm (AAA) without rupture (CMS/HCC)    Non-rheumatic tricuspid valve insufficiency       Respiratory    COPD mixed type (CMS/HCC) - Primary (Chronic)    Relevant Orders    Full Pulmonary Function Test With Bronchodilator & ABG    Incidental lung nodule, > 3mm and < 8mm       Digestive    Alcoholic cirrhosis of liver without ascites (CMS/HCC) (Chronic)       Other    Anxiety disorder (Chronic)          1. Stable 5 cm ascending aortic aneurysm  2. Strong family history for aneurysms and complications  3. End-stage lung disease on home oxygen and RV dysfunction  4. Ischemic cardiomyopathy  5. Severe two-vessel coronary artery disease  6. Alcohol cirrhosis at least moderate if not severe  7. Status post AAA repair  8. Severe anxiety, better    Recommendation/Plan:     1. In terms of his ascending aortic aneurysm, it is stable.  I do not think he is an operative candidate due to prohibitive risk due to cirrhosis and end-stage lung disease.  I will confirm the status of the lung disease with new PFTs.  If anything changes, then I will discuss with him surgery.  He is at risk for rupture, may be a bit more that a regular 5 cm aneurysm.  But I do not think that he will go through an operation  2. To reverse coronary artery disease.  He does not have angina.  I recommend PCI versus medical management  3. Cirrhosis, seems to be stable.  I will calculate the meld score for further prognosis.  There is no evidence of recent upper GI bleed    Thank you for allowing me to participate in his care.    Regards,    Julián Mina M.D.

## 2020-08-31 ENCOUNTER — TRANSCRIBE ORDERS (OUTPATIENT)
Dept: SURGERY | Facility: CLINIC | Age: 80
End: 2020-08-31

## 2020-08-31 DIAGNOSIS — Z01.818 OTHER SPECIFIED PRE-OPERATIVE EXAMINATION: Primary | ICD-10-CM

## 2020-09-22 ENCOUNTER — HOSPITAL ENCOUNTER (EMERGENCY)
Facility: HOSPITAL | Age: 80
Discharge: HOME | End: 2020-09-22
Admitting: EMERGENCY MEDICINE

## 2020-09-22 VITALS
HEART RATE: 79 BPM | TEMPERATURE: 98 F | HEIGHT: 66 IN | RESPIRATION RATE: 18 BRPM | DIASTOLIC BLOOD PRESSURE: 84 MMHG | SYSTOLIC BLOOD PRESSURE: 155 MMHG | BODY MASS INDEX: 27.49 KG/M2 | WEIGHT: 171.08 LBS | OXYGEN SATURATION: 100 %

## 2020-09-22 DIAGNOSIS — N39.0 UTI (URINARY TRACT INFECTION), BACTERIAL: Primary | ICD-10-CM

## 2020-09-22 DIAGNOSIS — T45.511A COUMADIN TOXICITY, ACCIDENTAL OR UNINTENTIONAL, INITIAL ENCOUNTER: ICD-10-CM

## 2020-09-22 DIAGNOSIS — A49.9 UTI (URINARY TRACT INFECTION), BACTERIAL: Primary | ICD-10-CM

## 2020-09-22 LAB
ALBUMIN SERPL-MCNC: 4.1 G/DL (ref 3.5–5.2)
ALBUMIN/GLOB SERPL: 1.1 G/DL
ALP SERPL-CCNC: 111 U/L (ref 39–117)
ALT SERPL W P-5'-P-CCNC: 10 U/L (ref 1–41)
ANION GAP SERPL CALCULATED.3IONS-SCNC: 12 MMOL/L (ref 5–15)
AST SERPL-CCNC: 21 U/L (ref 1–40)
BACTERIA UR QL AUTO: ABNORMAL /HPF
BASOPHILS # BLD AUTO: 0.1 10*3/MM3 (ref 0–0.2)
BASOPHILS NFR BLD AUTO: 2 % (ref 0–1.5)
BILIRUB SERPL-MCNC: 0.7 MG/DL (ref 0–1.2)
BILIRUB UR QL STRIP: NEGATIVE
BUN SERPL-MCNC: 36 MG/DL (ref 8–23)
BUN SERPL-MCNC: ABNORMAL MG/DL
BUN/CREAT SERPL: ABNORMAL
CALCIUM SPEC-SCNC: 9.1 MG/DL (ref 8.6–10.5)
CHLORIDE SERPL-SCNC: 96 MMOL/L (ref 98–107)
CLARITY UR: ABNORMAL
CO2 SERPL-SCNC: 32 MMOL/L (ref 22–29)
COLOR UR: YELLOW
CREAT SERPL-MCNC: 1.14 MG/DL (ref 0.76–1.27)
DEPRECATED RDW RBC AUTO: 57.8 FL (ref 37–54)
DIGOXIN SERPL-MCNC: 1.3 NG/ML (ref 0.6–1.2)
EOSINOPHIL # BLD AUTO: 0.2 10*3/MM3 (ref 0–0.4)
EOSINOPHIL NFR BLD AUTO: 3.6 % (ref 0.3–6.2)
ERYTHROCYTE [DISTWIDTH] IN BLOOD BY AUTOMATED COUNT: 16.8 % (ref 12.3–15.4)
GFR SERPL CREATININE-BSD FRML MDRD: 62 ML/MIN/1.73
GLOBULIN UR ELPH-MCNC: 3.6 GM/DL
GLUCOSE BLDC GLUCOMTR-MCNC: 111 MG/DL (ref 70–105)
GLUCOSE SERPL-MCNC: 122 MG/DL (ref 65–99)
GLUCOSE UR STRIP-MCNC: NEGATIVE MG/DL
HCT VFR BLD AUTO: 38.5 % (ref 37.5–51)
HGB BLD-MCNC: 12.2 G/DL (ref 13–17.7)
HGB UR QL STRIP.AUTO: ABNORMAL
HOLD SPECIMEN: NORMAL
HYALINE CASTS UR QL AUTO: ABNORMAL /LPF
INR PPP: >=8 (ref 2–3)
KETONES UR QL STRIP: ABNORMAL
LEUKOCYTE ESTERASE UR QL STRIP.AUTO: ABNORMAL
LYMPHOCYTES # BLD AUTO: 0.9 10*3/MM3 (ref 0.7–3.1)
LYMPHOCYTES NFR BLD AUTO: 15 % (ref 19.6–45.3)
MCH RBC QN AUTO: 30.2 PG (ref 26.6–33)
MCHC RBC AUTO-ENTMCNC: 31.6 G/DL (ref 31.5–35.7)
MCV RBC AUTO: 95.6 FL (ref 79–97)
MONOCYTES # BLD AUTO: 0.6 10*3/MM3 (ref 0.1–0.9)
MONOCYTES NFR BLD AUTO: 10.6 % (ref 5–12)
NEUTROPHILS NFR BLD AUTO: 3.9 10*3/MM3 (ref 1.7–7)
NEUTROPHILS NFR BLD AUTO: 68.8 % (ref 42.7–76)
NITRITE UR QL STRIP: NEGATIVE
NRBC BLD AUTO-RTO: 0.8 /100 WBC (ref 0–0.2)
PH UR STRIP.AUTO: 6 [PH] (ref 5–8)
PLATELET # BLD AUTO: 277 10*3/MM3 (ref 140–450)
PMV BLD AUTO: 7 FL (ref 6–12)
POTASSIUM SERPL-SCNC: 4 MMOL/L (ref 3.5–5.2)
PROT SERPL-MCNC: 7.7 G/DL (ref 6–8.5)
PROT UR QL STRIP: ABNORMAL
PROTHROMBIN TIME: >90 SECONDS (ref 19.4–28.5)
RBC # BLD AUTO: 4.02 10*6/MM3 (ref 4.14–5.8)
RBC # UR: ABNORMAL /HPF
REF LAB TEST METHOD: ABNORMAL
SODIUM SERPL-SCNC: 140 MMOL/L (ref 136–145)
SP GR UR STRIP: 1.02 (ref 1–1.03)
SQUAMOUS #/AREA URNS HPF: ABNORMAL /HPF
UROBILINOGEN UR QL STRIP: ABNORMAL
WBC # BLD AUTO: 5.7 10*3/MM3 (ref 3.4–10.8)
WBC UR QL AUTO: ABNORMAL /HPF

## 2020-09-22 PROCEDURE — 80053 COMPREHEN METABOLIC PANEL: CPT | Performed by: NURSE PRACTITIONER

## 2020-09-22 PROCEDURE — 80162 ASSAY OF DIGOXIN TOTAL: CPT | Performed by: NURSE PRACTITIONER

## 2020-09-22 PROCEDURE — P9612 CATHETERIZE FOR URINE SPEC: HCPCS

## 2020-09-22 PROCEDURE — 81001 URINALYSIS AUTO W/SCOPE: CPT | Performed by: NURSE PRACTITIONER

## 2020-09-22 PROCEDURE — 85610 PROTHROMBIN TIME: CPT | Performed by: NURSE PRACTITIONER

## 2020-09-22 PROCEDURE — 99284 EMERGENCY DEPT VISIT MOD MDM: CPT

## 2020-09-22 PROCEDURE — 85025 COMPLETE CBC W/AUTO DIFF WBC: CPT | Performed by: NURSE PRACTITIONER

## 2020-09-22 PROCEDURE — 96375 TX/PRO/DX INJ NEW DRUG ADDON: CPT

## 2020-09-22 PROCEDURE — 82962 GLUCOSE BLOOD TEST: CPT

## 2020-09-22 PROCEDURE — 96365 THER/PROPH/DIAG IV INF INIT: CPT

## 2020-09-22 PROCEDURE — 87086 URINE CULTURE/COLONY COUNT: CPT | Performed by: NURSE PRACTITIONER

## 2020-09-22 PROCEDURE — 25010000002 CEFTRIAXONE IN SWFI 1 GRAM/10ML IV PUSH SYRINGE (SIMPLE): Performed by: NURSE PRACTITIONER

## 2020-09-22 PROCEDURE — 25010000003 PHYTONADIONE 10 MG/ML SOLUTION 1 ML AMPULE: Performed by: NURSE PRACTITIONER

## 2020-09-22 RX ORDER — CEFDINIR 300 MG/1
300 CAPSULE ORAL 2 TIMES DAILY
Qty: 10 CAPSULE | Refills: 0 | Status: SHIPPED | OUTPATIENT
Start: 2020-09-22

## 2020-09-22 RX ORDER — ROPINIROLE 0.25 MG/1
0.5 TABLET, FILM COATED ORAL ONCE
Status: COMPLETED | OUTPATIENT
Start: 2020-09-22 | End: 2020-09-22

## 2020-09-22 RX ORDER — SODIUM CHLORIDE 0.9 % (FLUSH) 0.9 %
10 SYRINGE (ML) INJECTION AS NEEDED
Status: DISCONTINUED | OUTPATIENT
Start: 2020-09-22 | End: 2020-09-22 | Stop reason: HOSPADM

## 2020-09-22 RX ADMIN — CEFTRIAXONE SODIUM 1 G: 1 INJECTION, POWDER, FOR SOLUTION INTRAMUSCULAR; INTRAVENOUS at 13:04

## 2020-09-22 RX ADMIN — PHYTONADIONE 5 MG: 10 INJECTION, EMULSION INTRAMUSCULAR; INTRAVENOUS; SUBCUTANEOUS at 13:04

## 2020-09-22 RX ADMIN — ROPINIROLE 0.5 MG: 0.25 TABLET, FILM COATED ORAL at 12:26

## 2020-09-22 NOTE — ED NOTES
Called chemistry and spoke to Kate, she is adding a digoxen level to the blood that is already down there.      Rosi Rojas  09/22/20 0155

## 2020-09-22 NOTE — ED PROVIDER NOTES
Subjective   Patient is a 79-year-old gentleman who comes in because he had blood work done at the primary care provider's office 2 days ago and was called today and told that he was Coumadin toxic and to come to the emergency room for evaluation.  The patient is able to tell me that he is on digoxin for his atrial fibrillation as well as Coumadin.-He has no active bleeding he has no complaints.  He also has a history of diabetes and has redness to his lower legs with the  with a right wound on the medial aspect of the lower leg with no drainage and no signs of secondary infection.  The patient is incontinent and wears a brief he smells heavily of foul urine.  He does not complain of any urinary symptoms at this time.  He denies fever or chills.          Review of Systems   Constitutional: Negative for chills, fatigue and fever.   HENT: Negative for congestion, tinnitus and trouble swallowing.    Eyes: Negative for photophobia, discharge and redness.   Respiratory: Negative for cough and shortness of breath.    Cardiovascular: Negative for chest pain and palpitations.   Gastrointestinal: Negative for abdominal pain, diarrhea, nausea and vomiting.   Genitourinary: Negative for dysuria, frequency and urgency.        Foul-smelling urine   Musculoskeletal: Negative for back pain, joint swelling and myalgias.   Skin: Negative for rash.        Redness noted to the lower legs with a small wound on the medial aspect of the right lower leg   Neurological: Negative for dizziness and headaches.   Psychiatric/Behavioral: Negative for confusion.   All other systems reviewed and are negative.      Past Medical History:   Diagnosis Date   • AAA (abdominal aortic aneurysm) (CMS/HCC)    • Alcohol abuse     Hx   • Alcoholic cirrhosis (CMS/HCC)    • Ankle edema, bilateral     at times   • Anxiety     Welbutrin   • Anxiety    • Aortic aneurysm (CMS/HCC) 09/19/2018    Ascending Measuring 4.7CM Noted on CT Chest   • Atrial fibrillation  (CMS/HCC)    • Cardiomegaly 09/19/2018    CT Chest   • Centrilobular emphysema (CMS/HCC) 09/19/2018    On CT Chest   • CHF (congestive heart failure) (CMS/HCC)    • Cholecystolithiasis 09/19/2018    On CT Chest   • Chronic liver disease    • COPD (chronic obstructive pulmonary disease) (CMS/HCC)    • Coronary artery disease    • Depression    • Descending thoracic aortic aneurysm (CMS/HCC) 09/19/2018    On Chest CT-4.2CM Near Diaphragmatic Hiatus and 4.8CM at Main Pulmonary Artery   • Diabetes mellitus (CMS/HCC)    • Diarrhea    • History of echocardiogram 12/20/16 & 8/2/17-[facility]   • History of EKG 12/2016    Atrial Fib   • Hx of chest x-ray 12/18/2016    1 V   • Hx of chest x-ray 08/2011   • Hx of CT [facility] of chest [facility]/18[facility]    Enlarged Heart Size; Coronary Artery and Aortic Atherosclerotic Calcifications; Ascending Aorta Measuring 4.7CM;    • Long term current use of anticoagulant    • On home oxygen therapy     2 LPM QHS   • Paraseptal emphysema (CMS/HCC) 09/19/2018    On CT Chest   • Pleural nodule 09/19/2018    On CT Chest-6MM   • Pneumonia 06/2019    Hx   • Pulmonary hypertension (CMS/HCC)    • Pulmonary nodules 09/19/2018    R on CT Chest   • Respiratory failure (CMS/HCC) Hypoxic   • Sleep apnea     uses only O2 QHS       Allergies   Allergen Reactions   • Gabapentin Other (See Comments) and Confusion     Vision loss     • Mirtazapine Hallucinations       Past Surgical History:   Procedure Laterality Date   • ABDOMINAL AORTIC ANEURYSM REPAIR WITH ENDOGRAFT N/A 9/23/2019    Procedure: ENDOVASCULAR AORTIC ANEURYSM REPAIR WITH GORE (EVAR);  Surgeon: Dewayne Victoria MD;  Location: [facility] OR;  Service: Vascular   • BRONCHOSCOPY  1/12/17-[facility]    Dr. Moreno   • CARDIAC CATHETERIZATION N/A 7/10/2020    Procedure: Left Heart Cath;  Surgeon: Qamar Lau MD;  Location: [facility] CATH INVASIVE LOCATION;  Service: Cardiovascular;  Laterality: N/A;   • CARDIAC CATHETERIZATION N/A 7/10/2020     Procedure: Coronary angiography;  Surgeon: Qamar Lau MD;  Location: [facility] INVASIVE LOCATION;  Service: Cardiovascular;  Laterality: N/A;   • CATARACT EXTRACTION, BILATERAL     • COLONOSCOPY  08/23/2011    w/EGD-Dr. Tse   • COLONOSCOPY  10/2006    w/EGD-Diony Moy MD   • TONSILLECTOMY         Family History   Problem Relation Age of Onset   • Diabetes Sister        Social History     Socioeconomic History   • Marital status:      Spouse name: Monalisa   • Number of children: Not on file   • Years of education: Not on file   • Highest education level: Not on file   Occupational History   • Occupation: RETIRED   Tobacco Use   • Smoking status: Former Smoker     Years: 2.00     Types: Cigarettes   • Smokeless tobacco: Never Used   • Tobacco comment: 5 Cigs/Day x 58+ Yrs   Substance and Sexual Activity   • Alcohol use: No     Comment: Hx Alcohol Abuse   • Drug use: No   • Sexual activity: Defer           Objective   Physical Exam  Vitals signs reviewed.   Constitutional:       Appearance: He is well-developed. He is ill-appearing.   HENT:      Head: Normocephalic and atraumatic.      Right Ear: Tympanic membrane normal.      Left Ear: Tympanic membrane normal.      Nose: Nose normal.      Mouth/Throat:      Mouth: Mucous membranes are moist.      Pharynx: Oropharynx is clear.   Eyes:      Conjunctiva/sclera: Conjunctivae normal.      Pupils: Pupils are equal, round, and reactive to light.   Neck:      Musculoskeletal: Normal range of motion and neck supple.   Cardiovascular:      Rate and Rhythm: Normal rate and regular rhythm.      Pulses: Normal pulses.      Heart sounds: Normal heart sounds.   Pulmonary:      Effort: Pulmonary effort is normal.      Breath sounds: Normal breath sounds.   Abdominal:      General: Bowel sounds are normal.      Palpations: Abdomen is soft.   Genitourinary:     Penis: Normal.    Musculoskeletal: Normal range of motion.   Skin:     General: Skin is  "warm and dry.      Capillary Refill: Capillary refill takes 2 to 3 seconds.          Neurological:      General: No focal deficit present.      Mental Status: He is alert and oriented to person, place, and time.      GCS: GCS eye subscore is 4. GCS verbal subscore is 5. GCS motor subscore is 6.   Psychiatric:         Mood and Affect: Mood normal.         Thought Content: Thought content normal.         Procedures           ED Course  ED Course as of Sep 22 1407   Tue Sep 22, 2020   1405 Spoke with Monalisa the patient's wife who verbalized and understood the need to give the antibiotics until gone    [KW]      ED Course User Index  [KW] Donte Ewa D, APRN      /73   Pulse 77   Temp 98.1 °F (36.7 °C) (Oral)   Resp 18   Ht 167.6 cm (66\")   Wt 77.6 kg (171 lb 1.2 oz)   SpO2 99%   BMI 27.61 kg/m²   Labs Reviewed   COMPREHENSIVE METABOLIC PANEL - Abnormal; Notable for the following components:       Result Value    Glucose 122 (*)     Chloride 96 (*)     CO2 32.0 (*)     All other components within normal limits    Narrative:     GFR Normal >60  Chronic Kidney Disease <60  Kidney Failure <15     PROTIME-INR - Abnormal; Notable for the following components:    Protime >90.0 (*)     INR >=8.00 (*)     All other components within normal limits   CBC WITH AUTO DIFFERENTIAL - Abnormal; Notable for the following components:    RBC 4.02 (*)     Hemoglobin 12.2 (*)     RDW 16.8 (*)     RDW-SD 57.8 (*)     Lymphocyte % 15.0 (*)     Basophil % 2.0 (*)     nRBC 0.8 (*)     All other components within normal limits   URINALYSIS W/ CULTURE IF INDICATED - Abnormal; Notable for the following components:    Appearance, UA Cloudy (*)     Ketones, UA Trace (*)     Blood, UA Large (3+) (*)     Protein, UA 30 mg/dL (1+) (*)     Leuk Esterase, UA Trace (*)     All other components within normal limits   BUN - Abnormal; Notable for the following components:    BUN 36 (*)     All other components within normal limits "   DIGOXIN LEVEL - Abnormal; Notable for the following components:    Digoxin 1.30 (*)     All other components within normal limits   URINALYSIS, MICROSCOPIC ONLY - Abnormal; Notable for the following components:    RBC, UA Too Numerous to Count (*)     WBC, UA 6-12 (*)     Bacteria, UA 3+ (*)     All other components within normal limits   POCT GLUCOSE FINGERSTICK - Abnormal; Notable for the following components:    Glucose 111 (*)     All other components within normal limits   URINE CULTURE   CBC AND DIFFERENTIAL    Narrative:     The following orders were created for panel order CBC & Differential.  Procedure                               Abnormality         Status                     ---------                               -----------         ------                     CBC Auto Differential[[id number]]        Abnormal            Final result                 Please view results for these tests on the individual orders.   EXTRA TUBES    Narrative:     The following orders were created for panel order Extra Tubes.  Procedure                               Abnormality         Status                     ---------                               -----------         ------                     Gold Top - SST[[id number]]                                   Final result                 Please view results for these tests on the individual orders.   GOLD TOP - SST     Medications   sodium chloride 0.9 % flush 10 mL (has no administration in time range)   rOPINIRole (REQUIP) tablet 0.5 mg (0.5 mg Oral Given 9/22/20 1226)   phytonadione (AQUA-MEPHYTON, VITAMIN K) 5 mg in sodium chloride 0.9 % 50 mL IVPB (0 mg Intravenous Stopped 9/22/20 1314)   cefTRIAXone (ROCEPHIN) in SWFI 1 gram/10ml IV PUSH syringe (1 g Intravenous Given 9/22/20 1304)     No radiology results for the last day                                  MDM  Number of Diagnoses or Management Options  Coumadin toxicity, accidental or unintentional, initial encounter:   UTI  (urinary tract infection), bacterial:   Diagnosis management comments: Patient had IV established and blood work was obtained his digoxin level was found to be 1.3 his urinalysis showed 3+ bacteria with 6-12 WBCs and his INR was greater than 8.-Patient continues to have no active bleeding he was given 5 mg of IV vitamin K and Rocephin for urinary tract infection.-  The patient was discussed with Dr. Santiago and will be discharged back home to hold his Coumadin for the next 2 days and then follow-up with Dr. Root's office for recheck on Friday-    I contacted the patient's wife-who verbalized and understood the need to hold the Coumadin for the next 2 days and have the INR rechecked on Friday she also understood the need to get the antibiotics as prescribed and to return if worse she verbalized understood discharge instructions chili       Amount and/or Complexity of Data Reviewed  Clinical lab tests: reviewed    Patient Progress  Patient progress: improved      Final diagnoses:   UTI (urinary tract infection), bacterial   Coumadin toxicity, accidental or unintentional, initial encounter            Ewa Kent, APRN  09/22/20 9909

## 2020-09-22 NOTE — DISCHARGE INSTRUCTIONS
Hold the patient's Coumadin or warfarin for the next 2 days.    Call Dr. Root's office for follow-up and check of INR on Friday.    Take antibiotics till gone for urinary tract infection    Return if worse

## 2020-09-22 NOTE — ED NOTES
Pt was sent here for  INR 12.45 that he had in the Drs office yesterday and was called at home to come to ED.     Clari Cifuentes, FARTUNN  09/22/20 0909

## 2020-09-24 LAB — BACTERIA SPEC AEROBE CULT: ABNORMAL

## 2020-09-24 NOTE — PROGRESS NOTES
Patient urine culture resulted with  Aerococcus urinae . Susceptible to Cephalosporins (3rd gen). Patient was given Rx for cefdinir. Therapy is appropriate coverage. No further follow-up required. Called to speak with wife, who claims patient is not experiencing any urinary symptoms.     Microbiology Results (last 10 days)       Procedure Component Value - Date/Time    Urine Culture - Urine, Urine, Catheter In/Out [[id number]]  (Abnormal) Collected: 09/22/20 1033    Lab Status: Final result Specimen: Urine, Catheter In/Out Updated: 09/24/20 0218     Urine Culture >100,000 CFU/mL Aerococcus urinae    Narrative:      Aerococcus urinae are usually susceptible to Penicillin G, Vancomycin, and Tetracycline and Resistant to Trimethoprim sulfamethoxazole and Gentamicin.              Barbie Hernandez, PharmD  9/24/2020 11:15 EDT

## 2020-09-30 ENCOUNTER — APPOINTMENT (OUTPATIENT)
Dept: LAB | Facility: HOSPITAL | Age: 80
End: 2020-09-30

## 2020-10-02 ENCOUNTER — APPOINTMENT (OUTPATIENT)
Dept: RESPIRATORY THERAPY | Facility: HOSPITAL | Age: 80
End: 2020-10-02

## 2020-11-17 ENCOUNTER — APPOINTMENT (OUTPATIENT)
Dept: VASCULAR SURGERY | Facility: HOSPITAL | Age: 80
End: 2020-11-17

## 2020-11-17 ENCOUNTER — APPOINTMENT (OUTPATIENT)
Dept: CARDIOLOGY | Facility: HOSPITAL | Age: 80
End: 2020-11-17

## 2020-12-03 DIAGNOSIS — I25.10 CORONARY ARTERY DISEASE INVOLVING NATIVE CORONARY ARTERY OF NATIVE HEART WITHOUT ANGINA PECTORIS: ICD-10-CM

## 2020-12-03 DIAGNOSIS — I48.0 PAF (PAROXYSMAL ATRIAL FIBRILLATION) (HCC): ICD-10-CM

## 2020-12-03 DIAGNOSIS — I50.82 BIVENTRICULAR CONGESTIVE HEART FAILURE (HCC): Primary | ICD-10-CM

## 2020-12-03 DIAGNOSIS — I50.21 SYSTOLIC CHF, ACUTE (HCC): ICD-10-CM

## 2021-05-21 NOTE — CONSULTS
PULMONARY/ CRITICAL CARE/ SLEEP MEDICINE CONSULT NOTE        Patient Name:  Giles Frost    :  1940    Medical Record:  [id number]    REQUESTING PHYSICIAN    Qamar Lau*    PRIMARY CARE PHYSICIAN     Valeriano Lewis MD    REASON FOR CONSULTATION    Giles Frost is a 79 y.o. male who was referred for consultation for shortness of breath and pulmonary hypertension    Patient has history of congestive heart failure and also has COPD for which he uses Combivent as needed.  He has remote history of tobacco use which he quit about 10 years ago.  He states that he has been using supplemental oxygen 2 L nasal cannula at night.  At one point he was diagnosed with sleep apnea but could not tolerate his CPAP machine and returned it.  He has been using supplemental oxygen since and feels that it is helping.  He has been having some issues with some worsening shortness of breath and lower extremity edema.  He was evaluated by his cardiologist and was planned for an elective heart catheterization.  Patient was noted to have some worsening shortness of breath and he subsequently was admitted to the hospital for further evaluation.  I have been asked to see him for further evaluation of his shortness of breath.  At time of my evaluation patient is awake.  He does complain of shortness of breath with minimal activity.  He has some cough and congestion cough is mostly dry.  He denies any chest pain or palpitations.  He has been having some increased lower extremity edema.  He denies any fever or chills.  No nausea or vomiting.  No history of diet pill use.  No history of blood clots.  No family history of pulmonary hypertension.  His typical bedtime is around 9 PM.  He gets up multiple times at night.  He says that his sleep is very disrupted.  He has issues with restless leg syndrome and because of his worsening symptoms over the last few days he has started taking his Requip 3 times a day and states  that it helps.    REVIEW OF SYSTEMS    Constitutional:  Denies fever or chills   Eyes:  Denies change in visual acuity   HENT:  Denies nasal congestion or sore throat   Respiratory:  + shortness of breath   Cardiovascular:  Denies chest pain +edema   GI:  Denies abdominal pain, nausea, vomiting, bloody stools or diarrhea   :  Denies dysuria   Musculoskeletal:  Denies back pain or joint pain   Integument:  Denies rash   Neurologic:  Denies headache, focal weakness or sensory changes   Endocrine:  Denies polyuria or polydipsia   Lymphatic:  Denies swollen glands   Psychiatric:  Denies depression or anxiety     MEDICAL HISTORY    Past Medical History:   Diagnosis Date   • AAA (abdominal aortic aneurysm) (CMS/HCC)    • Alcohol abuse     Hx   • Alcoholic cirrhosis (CMS/HCC)    • Ankle edema, bilateral     at times   • Anxiety     Welbutrin   • Anxiety    • Aortic aneurysm (CMS/HCC) 09/19/2018    Ascending Measuring 4.7CM Noted on CT Chest   • Atrial fibrillation (CMS/HCC)    • Cardiomegaly 09/19/2018    CT Chest   • Centrilobular emphysema (CMS/HCC) 09/19/2018    On CT Chest   • CHF (congestive heart failure) (CMS/HCC)    • Cholecystolithiasis 09/19/2018    On CT Chest   • Chronic liver disease    • COPD (chronic obstructive pulmonary disease) (CMS/HCC)    • Depression    • Descending thoracic aortic aneurysm (CMS/HCC) 09/19/2018    On Chest CT-4.2CM Near Diaphragmatic Hiatus and 4.8CM at Main Pulmonary Artery   • Diabetes mellitus (CMS/HCC)    • Diarrhea    • History of echocardiogram 12/20/16 & 8/2/17- [facility]   • History of EKG 12/2016    Atrial Fib   • Hx of chest x-ray 12/18/2016    1 V   • Hx of chest x-ray 08/2011   • Hx of CT scan of chest 09/19/18-[facility]    Enlarged Heart Size; Coronary Artery and Aortic Atherosclerotic Calcifications; Ascending Aorta Measuring 4.7CM;    • Long term current use of anticoagulant    • On home oxygen therapy     2 LPM QHS   • Paraseptal emphysema (CMS/HCC) 09/19/2018    On CT Chest     • Pleural nodule 09/19/2018    On CT Chest-6MM   • Pneumonia 06/2019    Hx   • Pulmonary hypertension (CMS/HCC)    • Pulmonary nodules 09/19/2018    R on CT Chest   • Respiratory failure (CMS/HCC) Hypoxic   • Sleep apnea     uses only O2 QHS        SURGICAL HISTORY    Past Surgical History:   Procedure Laterality Date   • ABDOMINAL AORTIC ANEURYSM REPAIR WITH ENDOGRAFT N/A 9/23/2019    Procedure: ENDOVASCULAR AORTIC ANEURYSM REPAIR WITH GORE (EVAR);  Surgeon: Dewayne Victoria MD;  Location: [facility] OR;  Service: Vascular   • BRONCHOSCOPY  1/12/17-[facility]    Dr. Moreno   • CATARACT EXTRACTION, BILATERAL     • COLONOSCOPY  08/23/2011    w/EGD-Dr. Tse   • COLONOSCOPY  10/2006    w/EGD-Diony Moy MD   • TONSILLECTOMY          FAMILY HISTORY    Family History   Problem Relation Age of Onset   • Diabetes Sister        SOCIAL HISTORY    Social History     Tobacco Use   • Smoking status: Former Smoker     Years: 2.00     Types: Cigarettes   • Smokeless tobacco: Never Used   • Tobacco comment: 5 Cigs/Day x 58+ Yrs   Substance Use Topics   • Alcohol use: No     Comment: Hx Alcohol Abuse        ALLERGIES    Allergies   Allergen Reactions   • Gabapentin Other (See Comments) and Confusion     Vision loss         MEDICATIONS    Scheduled Meds:  budesonide 0.5 mg Nebulization BID - RT   digoxin 125 mcg Oral QAM   dilTIAZem 15 mg Oral BID   enoxaparin 80 mg Subcutaneous Q12H   furosemide 40 mg Intravenous BID   insulin lispro 0-9 Units Subcutaneous TID AC   ipratropium-albuterol 3 mL Nebulization 4x Daily - RT   rOPINIRole 0.5 mg Oral 3 times per day   sodium chloride 10 mL Intravenous Q12H     Continuous Infusions:  Pharmacy to Dose enoxaparin (LOVENOX)      PRN Meds:.•  acetaminophen **OR** acetaminophen **OR** acetaminophen  •  ALPRAZolam  •  dextrose  •  dextrose  •  glucagon (human recombinant)  •  insulin lispro **AND** insulin lispro  •  ipratropium-albuterol  •  magnesium sulfate **OR** magnesium sulfate in  D5W 1g/100mL (PREMIX)  •  ondansetron **OR** ondansetron  •  Pharmacy to Dose enoxaparin (LOVENOX)  •  potassium chloride  •  sodium chloride      PHYSICAL EXAM    tMax 24 hrs:  Temp (24hrs), Av.7 °F (36.5 °C), Min:96.3 °F (35.7 °C), Max:98.1 °F (36.7 °C)    Vitals Ranges:  Temp:  [96.3 °F (35.7 °C)-98.1 °F (36.7 °C)] 97.8 °F (36.6 °C)  Heart Rate:  [62-89] 81  Resp:  [14-22] 19  BP: (110-128)/(50-62) 113/51  Intake and Output Last 3 Shifts:  I/O last 3 completed shifts:  In: 600 [P.O.:600]  Out: 2260 [Urine:2260]    Constitutional: elderly, no acute distress, non-toxic appearance   Eyes:  PERRL, conjunctiva normal   HENT:  Atraumatic, external ears normal, nose normal, oropharynx moist, no pharyngeal exudates.   Neck- normal range of motion, no tenderness, supple   Respiratory: decreased air entry bilaterally, faint bibasilar crackles, no wheezes  Cardiovascular:  Normal rate, normal rhythm, no murmurs, no gallops, no rubs   GI:  Soft, nondistended, normal bowel sounds, nontender, no organomegaly, no mass, no rebound, no guarding   :  No costovertebral angle tenderness   Musculoskeletal:  ++ edema, no tenderness, no deformities. Back- no tenderness  Integument:  Well hydrated, no rash   Lymphatic:  No lymphadenopathy noted   Neurologic: Alert, CN 2-12 normal, normal motor function, normal sensory function, no focal deficits noted   Psychiatric:  Speech and behavior appropriate     LABS    Lab Results (last 24 hours)     Procedure Component Value Units Date/Time    POC Glucose Once [[id number]]  (Abnormal) Collected:  20 1121    Specimen:  Blood Updated:  20 1126     Glucose 113 mg/dL      Comment: Serial Number: [id number]:  [id number]       Protime-INR [[id number]]  (Abnormal) Collected:  20 0825    Specimen:  Blood Updated:  20 0931     Protime 12.8 Seconds      INR 1.30    BNP [[id number]]  (Abnormal) Collected:  20 0336    Specimen:  Blood Updated:  20      proBNP 2,587.0 pg/mL     Narrative:       Among patients with dyspnea, NT-proBNP is highly sensitive for the detection of acute congestive heart failure. In addition NT-proBNP of <300 pg/ml effectively rules out acute congestive heart failure with 99% negative predictive value.    Results may be falsely decreased if patient taking Biotin.      POC Glucose Once [[id number]]  (Abnormal) Collected:  07/09/20 0710    Specimen:  Blood Updated:  07/09/20 0715     Glucose 111 mg/dL      Comment: Serial Number: [id number]:  [id number]       BUN [[id number]]  (Normal) Collected:  07/09/20 0336    Specimen:  Blood Updated:  07/09/20 0714     BUN 17 mg/dL     Comprehensive Metabolic Panel [[id number]]  (Abnormal) Collected:  07/09/20 0336    Specimen:  Blood Updated:  07/09/20 0449     Glucose 110 mg/dL      BUN --     Comment: Testing performed by alternate method        Creatinine 1.05 mg/dL      Sodium 142 mmol/L      Potassium 3.7 mmol/L      Chloride 94 mmol/L      CO2 37.0 mmol/L      Calcium 9.7 mg/dL      Total Protein 7.7 g/dL      Albumin 4.30 g/dL      ALT (SGPT) 10 U/L      AST (SGOT) 20 U/L      Alkaline Phosphatase 92 U/L      Total Bilirubin 0.9 mg/dL      eGFR Non African Amer 68 mL/min/1.73      Globulin 3.4 gm/dL      A/G Ratio 1.3 g/dL      BUN/Creatinine Ratio --     Comment: Testing not performed        Anion Gap 11.0 mmol/L     Narrative:       GFR Normal >60  Chronic Kidney Disease <60  Kidney Failure <15      CBC Auto Differential [[id number]]  (Abnormal) Collected:  07/09/20 0336    Specimen:  Blood Updated:  07/09/20 0427     WBC 7.00 10*3/mm3      RBC 4.29 10*6/mm3      Hemoglobin 13.2 g/dL      Hematocrit 40.2 %      MCV 93.8 fL      MCH 30.8 pg      MCHC 32.8 g/dL      RDW 15.9 %      RDW-SD 53.8 fl      MPV 7.1 fL      Platelets 218 10*3/mm3      Neutrophil % 65.2 %      Lymphocyte % 18.5 %      Monocyte % 9.7 %      Eosinophil % 5.7 %      Basophil % 0.9 %      Neutrophils, Absolute 4.50  10*3/mm3      Lymphocytes, Absolute 1.30 10*3/mm3      Monocytes, Absolute 0.70 10*3/mm3      Eosinophils, Absolute 0.40 10*3/mm3      Basophils, Absolute 0.10 10*3/mm3      nRBC 0.1 /100 WBC     POC Glucose Once [[id number]]  (Normal) Collected:  07/08/20 2012    Specimen:  Blood Updated:  07/08/20 2014     Glucose 78 mg/dL      Comment: Serial Number: [id number]:  [id number]       POC Glucose Once [[id number]]  (Abnormal) Collected:  07/08/20 1637    Specimen:  Blood Updated:  07/08/20 1640     Glucose 153 mg/dL      Comment: Serial Number: [id number]:  [id number]            Microbiology Results (last 10 days)     Procedure Component Value - Date/Time    COVID PRE-OP / PRE-PROCEDURE SCREENING ORDER (NO ISOLATION) - Swab, Nasopharynx [[id number]] Collected:  07/06/20 1023    Lab Status:  Final result Specimen:  Swab from Nasopharynx Updated:  07/07/20 1551    Narrative:       The following orders were created for panel order COVID PRE-OP / PRE-PROCEDURE SCREENING ORDER (NO ISOLATION) - Swab, Nasopharynx.  Procedure                               Abnormality         Status                     ---------                               -----------         ------                     COVID-19,[facility], NP ...[[id number]]                      Final result                 Please view results for these tests on the individual orders.    COVID-19,[facility], NP SWAB IN LEXAR SALINE MEDIA 24-30 HR TAT - Swab, Nasopharynx [[id number]] Collected:  07/06/20 1023    Lab Status:  Final result Specimen:  Swab from Nasopharynx Updated:  07/07/20 1551     Reference Lab Report --     COVID19 Not Detected         CBC  Results from last 7 days   Lab Units 07/09/20  0336 07/08/20  1142 07/06/20  1023   WBC 10*3/mm3 7.00 6.60 7.47   RBC 10*6/mm3 4.29 4.17 4.23   HEMOGLOBIN g/dL 13.2 12.9* 13.0   HEMATOCRIT % 40.2 39.3 39.7   MCV fL 93.8 94.3 93.9   PLATELETS 10*3/mm3 218 207 223       BMP  Results from last 7 days   Lab Units  07/09/20  0336 07/08/20  1142 07/08/20  1018 07/06/20  1023   SODIUM mmol/L 142  --  139 138   POTASSIUM mmol/L 3.7  --  3.9 4.2   CHLORIDE mmol/L 94*  --  95* 94*   CO2 mmol/L 37.0*  --  33.0* 31.8*   BUN  17  --  19 18   CREATININE mg/dL 1.05  --  1.01 1.11   GLUCOSE mg/dL 110*  --  119* 132*   MAGNESIUM mg/dL  --  1.9  --   --        CMP Results from last 7 days   Lab Units 07/09/20  0336 07/08/20  1018 07/06/20  1023   SODIUM mmol/L 142 139 138   POTASSIUM mmol/L 3.7 3.9 4.2   CHLORIDE mmol/L 94* 95* 94*   CO2 mmol/L 37.0* 33.0* 31.8*   BUN  17 19 18   CREATININE mg/dL 1.05 1.01 1.11   GLUCOSE mg/dL 110* 119* 132*   ALBUMIN g/dL 4.30 4.00  --    BILIRUBIN mg/dL 0.9 0.9  --    ALK PHOS U/L 92 87  --    AST (SGOT) U/L 20 23  --    ALT (SGPT) U/L 10 11  --        TROPONIN  Results from last 7 days   Lab Units 07/08/20  1529   TROPONIN T ng/mL 0.032*       CoAg  Results from last 7 days   Lab Units 07/09/20  0825 07/08/20  1142 07/06/20  1023   INR  1.30* 1.30* 1.69*       Creatinine Clearance  Estimated Creatinine Clearance: 54.3 mL/min (by C-G formula based on SCr of 1.05 mg/dL).    ABG      IMAGING & OTHER STUDIES    Imaging Results (Last 72 Hours)     Procedure Component Value Units Date/Time    XR Chest PA & Lateral [[id number]] Collected:  07/08/20 1333     Updated:  07/08/20 1337    Narrative:       EXAMINATION: XR CHEST PA AND LATERAL-     DATE OF EXAM: 7/8/2020 12:30 PM     INDICATION: shortness of breath; I71.2-Thoracic aortic aneurysm, without  rupture; I48.0-Paroxysmal atrial fibrillation; K70.30-Alcoholic  cirrhosis of liver without ascites; I50.82-Biventricular heart failure.     COMPARISON: Chest radiograph dated 09/24/2019, chest CT dated  11/13/2019.     TECHNIQUE: PA and Lateral views of the chest were obtained.     FINDINGS:  There is stable cardiomegaly. There is aortic arch atherosclerotic  calcification. Pulmonary vascularity appears within normal limits. There  is emphysema within the upper  lobes. There is biapical  pleural-parenchymal scarring. There is chronic bronchial wall thickening  and interstitial opacity within the left lower lobe. There is chronic  blunting of the right costophrenic angle which may relate to a trace  right effusion. There is an anterior wedging compression deformity which  appears correspond to the T12 level.       Impression:       1. Chronic left lower lobe bronchial wall thickening with interstitial  opacity which may represent infection and bronchitis/bronchiolitis.  2. Chronic blunting of the right costophrenic angle likely related to a  trace effusion or scarring.  3. Upper lobe predominant emphysema with biapical pleural-parenchymal  scarring.     Electronically Signed By-Elbert Bruner On:7/8/2020 1:35 PM  This report was finalized on [id number] by  Elbert Bruner .    XR Abdomen KUB [[id number]] Collected:  07/08/20 1332     Updated:  07/08/20 1335    Narrative:       DATE OF EXAM:  7/8/2020 12:30 PM     PROCEDURE:  XR ABDOMEN KUB-     INDICATIONS:  Abdominal distention, cirrhosis.     COMPARISON:  11/7/2019.     TECHNIQUE:   Single radiographic view of the abdomen was obtained.        FINDINGS:  The bowel gas pattern is unremarkable. There is no free air or  pneumatosis. There are abnormal basilar densities felt to represent  interstitial fibrosis and scarring, left greater than right side. There  are vascular calcifications within the abdomen and pelvis. The patient  has abdominal aortic stent graft in place. There are degenerative  changes of the thoracolumbar spine and sacroiliac joints.        Impression:       Unremarkable bowel gas pattern.     Electronically Signed ByJarrett Baltazar On:7/8/2020 1:33 PM  This report was finalized on [id number] by  Tim Baltazar, .        Results for orders placed during the hospital encounter of 06/09/20   Adult Transthoracic Echo Complete W/ Cont if Necessary Per Protocol    Narrative · Estimated EF = 35%.  · Right  15 ventricular cavity is severely dilated.  · Moderately reduced right ventricular systolic function noted.  · Left atrial cavity size is moderate-to-severely dilated.  · Mild mitral valve regurgitation is present  · Severe tricuspid valve regurgitation is present.  · The following left ventricular wall segments are hypokinetic: mid   anterior, apical anterior, basal anterolateral, mid anterolateral, apical   lateral, basal inferolateral, mid inferolateral, apical inferior, mid   inferior, apical septal, basal inferoseptal, mid inferoseptal, apex   hypokinetic, mid anteroseptal, basal anterior, basal inferior and basal   inferoseptal.            ASSESSMENT      Systolic CHF, acute (CMS/HCC)    Ascending aortic aneurysm (CMS/HCC)    PAF (paroxysmal atrial fibrillation) (CMS/HCC)    COPD mixed type (CMS/HCC)    Alcoholic cirrhosis of liver without ascites (CMS/HCC)    Biventricular congestive heart failure (CMS/HCC)    RLS (restless legs syndrome)    DM (diabetes mellitus) (CMS/HCC)    Anxiety disorder    Hypomagnesemia    Abdominal distention  Pulmonary hypertension.  Recent 2D echo with EF 35%.  RVSP greater than 55 mmHg  COPD  Obstructive sleep apnea with inability to tolerate CPAP.  Chronic hypoxemic respiratory failure.  Patient uses 2 L supplemental oxygen at night at home.  Restless leg syndrome  History of cirrhosis  Diabetes  Acute on chronic systolic congestive heart failure exacerbation  PLAN    I believe his pulmonary hypertension is likely WHO group 2 and 3 related to his left heart dysfunction, untreated sleep apnea and COPD.  We will continue to monitor further recommendations depending on his progress  We will continue with supportive therapy.  Cardiology following for right and left heart catheterization.  We will reevaluate need for supplemental oxygen at discharge.  Patient is currently on 2 L nasal cannula.  He may need supplemental oxygen during the day at home.  He has been using only supplemental  oxygen at night at home  For COPD will continue with budesonide and nebs.  He uses Combivent at home.  He remains on Requip which has been adjusted.  Continue to monitor.  Remains on IV diuresis.  We will continue with rest of therapy he is on therapeutic Lovenox.  I thank you for this opportunity to take part in this patient's care and will follow the patient along with you.

## (undated) DEVICE — APPL CHLORAPREP W/TINT 26ML ORNG

## (undated) DEVICE — CATH DIAG IMPULSE FR4 6F 100CM

## (undated) DEVICE — CATH DIAG IMPULSE FL4 6F 100CM

## (undated) DEVICE — BALN OCCL MOLDING .035 10TO37MM 4X90CM

## (undated) DEVICE — RADIFOCUS GLIDEWIRE: Brand: GLIDEWIRE

## (undated) DEVICE — SYR INJ ILLUMENA W/HANDIFILL 150ML LTX STRL

## (undated) DEVICE — PERCLOSE PROGLIDE™ SUTURE-MEDIATED CLOSURE SYSTEM: Brand: PERCLOSE PROGLIDE™

## (undated) DEVICE — GW PTFE EMERALD HEPCOAT FC J TIP STD .035 3MM 150CM

## (undated) DEVICE — GLV SURG TRIUMPH GREEN W/ALOE PF LTX 8.5 STRL

## (undated) DEVICE — CATH DIAG IMPULSE PIG .056 6F 110CM

## (undated) DEVICE — CATH FLSH IMPRESS PIG .035IN .049IN/ID 10SD/PRT 5F 65CM

## (undated) DEVICE — RADIFOCUS GLIDEWIRE ADVANTAGE GUIDEWIRE: Brand: GLIDEWIRE ADVANTAGE

## (undated) DEVICE — RADIFOCUS TORQUE DEVICE MULTI-TORQUE VISE: Brand: RADIFOCUS TORQUE DEVICE

## (undated) DEVICE — Device

## (undated) DEVICE — DEV INFL COMPAK W/ACCESSPLUS IN4530

## (undated) DEVICE — IR MAJOR VASCULAR PACK: Brand: MEDLINE INDUSTRIES, INC.

## (undated) DEVICE — GW AMPLTZ SUPERSTIFF STR .035IN 180CM

## (undated) DEVICE — ANGIOGRAPHIC CATHETER: Brand: IMPULSE™

## (undated) DEVICE — 3M™ STERI-STRIP™ REINFORCED ADHESIVE SKIN CLOSURES, R1547, 1/2 IN X 4 IN (12 MM X 100 MM), 6 STRIPS/ENVELOPE: Brand: 3M™ STERI-STRIP™

## (undated) DEVICE — PK PROC TURNOVER

## (undated) DEVICE — GLV SURG BIOGEL LTX PF 8 1/2

## (undated) DEVICE — NDL ART WING 18G 7CM

## (undated) DEVICE — VASCULAR CDS: Brand: MEDLINE INDUSTRIES, INC.

## (undated) DEVICE — SUT PROLN 6/0 BV 30IN 8776H

## (undated) DEVICE — UNDYED BRAIDED (POLYGLACTIN 910), SYNTHETIC ABSORBABLE SUTURE: Brand: COATED VICRYL

## (undated) DEVICE — PINNACLE R/O II INTRODUCER SHEATH WITH RADIOPAQUE MARKER: Brand: PINNACLE

## (undated) DEVICE — GW EMR FIX EXCHG J STD .035 3MM 260CM

## (undated) DEVICE — SOL NACL 0.9PCT 1000ML

## (undated) DEVICE — PINNACLE INTRODUCER SHEATH: Brand: PINNACLE

## (undated) DEVICE — CATH CERBRL SELCT BRD TORQ .038 5F BERN 65CM

## (undated) DEVICE — SKIN AFFIX SURG ADHESIVE 72/CS 0.55ML: Brand: MEDLINE

## (undated) DEVICE — ST ACC MICROPUNCTURE STFF .018 ECHO/PLDM/TP 4F/10CM 21G/7CM

## (undated) DEVICE — MICRO TIP WIPE: Brand: DEVON

## (undated) DEVICE — PK ATS CUST W CARDIOTOMY RESEVOIR

## (undated) DEVICE — INTRO SHEATH DRYSEAL FLEX 18F 6.0TO6.7MM 33CM

## (undated) DEVICE — C-ARM: Brand: UNBRANDED

## (undated) DEVICE — INTRO SHEATH DRYSEAL FLEX 12F4TO4.7MM 33CM

## (undated) DEVICE — SOL IRRIG NACL 9PCT 1000ML BTL

## (undated) DEVICE — LN INJ CONTRST FLXCIL HP F/M LL 1200PSI72

## (undated) DEVICE — PK TRY HEART CATH 50

## (undated) DEVICE — SOL IRRIG H2O 1000ML STRL

## (undated) DEVICE — SKIN PREP TRAY W/CHG: Brand: MEDLINE INDUSTRIES, INC.

## (undated) DEVICE — GOWN,REINF,POLY,ECL,PP SLV,XXL: Brand: MEDLINE